# Patient Record
Sex: MALE | Employment: OTHER | URBAN - METROPOLITAN AREA
[De-identification: names, ages, dates, MRNs, and addresses within clinical notes are randomized per-mention and may not be internally consistent; named-entity substitution may affect disease eponyms.]

---

## 2018-09-26 ENCOUNTER — APPOINTMENT (OUTPATIENT)
Dept: GENERAL RADIOLOGY | Age: 73
DRG: 871 | End: 2018-09-26
Attending: EMERGENCY MEDICINE
Payer: MEDICARE

## 2018-09-26 ENCOUNTER — HOSPITAL ENCOUNTER (INPATIENT)
Age: 73
LOS: 12 days | Discharge: NURSING, REHAB OR LONG-TERM CARE | DRG: 871 | End: 2018-10-08
Attending: EMERGENCY MEDICINE | Admitting: HOSPITALIST
Payer: MEDICARE

## 2018-09-26 ENCOUNTER — APPOINTMENT (OUTPATIENT)
Dept: CT IMAGING | Age: 73
DRG: 871 | End: 2018-09-26
Attending: EMERGENCY MEDICINE
Payer: MEDICARE

## 2018-09-26 DIAGNOSIS — R06.02 SOB (SHORTNESS OF BREATH): Primary | ICD-10-CM
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
## 2018-09-26 PROBLEM — R09.02 HYPOXIA: Status: ACTIVE | Noted: 2018-09-26

## 2018-09-26 PROBLEM — J20.9 ACUTE BRONCHITIS WITH COPD (HCC): Status: ACTIVE | Noted: 2018-09-26

## 2018-09-26 PROBLEM — J44.0 ACUTE BRONCHITIS WITH COPD (HCC): Status: ACTIVE | Noted: 2018-09-26

## 2018-09-26 LAB
ALBUMIN SERPL-MCNC: 2.6 G/DL (ref 3.4–5)
ALBUMIN/GLOB SERPL: 0.4 {RATIO} (ref 0.8–1.7)
ALP SERPL-CCNC: 103 U/L (ref 45–117)
ALT SERPL-CCNC: 11 U/L (ref 16–61)
ANION GAP SERPL CALC-SCNC: 12 MMOL/L (ref 3–18)
APPEARANCE UR: CLEAR
ARTERIAL PATENCY WRIST A: YES
AST SERPL-CCNC: 15 U/L (ref 15–37)
BACTERIA URNS QL MICRO: ABNORMAL /HPF
BASE DEFICIT BLD-SCNC: 2 MMOL/L
BASOPHILS # BLD: 0 K/UL (ref 0–0.1)
BASOPHILS NFR BLD: 0 % (ref 0–2)
BDY SITE: ABNORMAL
BILIRUB SERPL-MCNC: 0.9 MG/DL (ref 0.2–1)
BILIRUB UR QL: NEGATIVE
BUN SERPL-MCNC: 26 MG/DL (ref 7–18)
BUN/CREAT SERPL: 27 (ref 12–20)
CALCIUM SERPL-MCNC: 8.1 MG/DL (ref 8.5–10.1)
CHLORIDE SERPL-SCNC: 98 MMOL/L (ref 100–108)
CK MB CFR SERPL CALC: 7.1 % (ref 0–4)
CK MB SERPL-MCNC: 3 NG/ML (ref 5–25)
CK SERPL-CCNC: 42 U/L (ref 39–308)
CO2 SERPL-SCNC: 26 MMOL/L (ref 21–32)
COLOR UR: ABNORMAL
CREAT SERPL-MCNC: 0.98 MG/DL (ref 0.6–1.3)
DIFFERENTIAL METHOD BLD: ABNORMAL
EOSINOPHIL # BLD: 0 K/UL (ref 0–0.4)
EOSINOPHIL NFR BLD: 0 % (ref 0–5)
EPITH CASTS URNS QL MICRO: ABNORMAL /LPF (ref 0–5)
ERYTHROCYTE [DISTWIDTH] IN BLOOD BY AUTOMATED COUNT: 13.3 % (ref 11.6–14.5)
GAS FLOW.O2 O2 DELIVERY SYS: ABNORMAL L/MIN
GLOBULIN SER CALC-MCNC: 6 G/DL (ref 2–4)
GLUCOSE SERPL-MCNC: 90 MG/DL (ref 74–99)
GLUCOSE UR STRIP.AUTO-MCNC: NEGATIVE MG/DL
HCO3 BLD-SCNC: 23.9 MMOL/L (ref 22–26)
HCT VFR BLD AUTO: 40.9 % (ref 36–48)
HGB BLD-MCNC: 13.9 G/DL (ref 13–16)
HGB UR QL STRIP: NEGATIVE
KETONES UR QL STRIP.AUTO: 15 MG/DL
LACTATE BLD-SCNC: 2.4 MMOL/L (ref 0.4–2)
LEUKOCYTE ESTERASE UR QL STRIP.AUTO: NEGATIVE
LYMPHOCYTES # BLD: 0.7 K/UL (ref 0.9–3.6)
LYMPHOCYTES NFR BLD: 6 % (ref 21–52)
MCH RBC QN AUTO: 32.9 PG (ref 24–34)
MCHC RBC AUTO-ENTMCNC: 34 G/DL (ref 31–37)
MCV RBC AUTO: 96.7 FL (ref 74–97)
MONOCYTES # BLD: 1.1 K/UL (ref 0.05–1.2)
MONOCYTES NFR BLD: 9 % (ref 3–10)
NEUTS SEG # BLD: 10.9 K/UL (ref 1.8–8)
NEUTS SEG NFR BLD: 85 % (ref 40–73)
NITRITE UR QL STRIP.AUTO: NEGATIVE
O2/TOTAL GAS SETTING VFR VENT: 21 %
PCO2 BLD: 42.2 MMHG (ref 35–45)
PH BLD: 7.36 [PH] (ref 7.35–7.45)
PH UR STRIP: 5 [PH] (ref 5–8)
PLATELET # BLD AUTO: 237 K/UL (ref 135–420)
PMV BLD AUTO: 11.3 FL (ref 9.2–11.8)
PO2 BLD: 55 MMHG (ref 80–100)
POTASSIUM SERPL-SCNC: 4.7 MMOL/L (ref 3.5–5.5)
PROT SERPL-MCNC: 8.6 G/DL (ref 6.4–8.2)
PROT UR STRIP-MCNC: ABNORMAL MG/DL
RBC # BLD AUTO: 4.23 M/UL (ref 4.7–5.5)
RBC #/AREA URNS HPF: ABNORMAL /HPF (ref 0–5)
SAO2 % BLD: 87 % (ref 92–97)
SERVICE CMNT-IMP: ABNORMAL
SODIUM SERPL-SCNC: 136 MMOL/L (ref 136–145)
SP GR UR REFRACTOMETRY: 1.02 (ref 1–1.03)
SPECIMEN TYPE: ABNORMAL
TOTAL RESP. RATE, ITRR: 20
TROPONIN I SERPL-MCNC: 0.18 NG/ML (ref 0–0.04)
UROBILINOGEN UR QL STRIP.AUTO: 1 EU/DL (ref 0.2–1)
WBC # BLD AUTO: 12.7 K/UL (ref 4.6–13.2)
WBC URNS QL MICRO: ABNORMAL /HPF (ref 0–4)

## 2018-09-26 PROCEDURE — 94640 AIRWAY INHALATION TREATMENT: CPT

## 2018-09-26 PROCEDURE — 83605 ASSAY OF LACTIC ACID: CPT

## 2018-09-26 PROCEDURE — 74011000250 HC RX REV CODE- 250: Performed by: EMERGENCY MEDICINE

## 2018-09-26 PROCEDURE — 81001 URINALYSIS AUTO W/SCOPE: CPT | Performed by: EMERGENCY MEDICINE

## 2018-09-26 PROCEDURE — 70450 CT HEAD/BRAIN W/O DYE: CPT

## 2018-09-26 PROCEDURE — 87077 CULTURE AEROBIC IDENTIFY: CPT | Performed by: EMERGENCY MEDICINE

## 2018-09-26 PROCEDURE — 74011250636 HC RX REV CODE- 250/636: Performed by: EMERGENCY MEDICINE

## 2018-09-26 PROCEDURE — 71045 X-RAY EXAM CHEST 1 VIEW: CPT

## 2018-09-26 PROCEDURE — 93005 ELECTROCARDIOGRAM TRACING: CPT

## 2018-09-26 PROCEDURE — 74011250636 HC RX REV CODE- 250/636: Performed by: HOSPITALIST

## 2018-09-26 PROCEDURE — 74011636320 HC RX REV CODE- 636/320: Performed by: EMERGENCY MEDICINE

## 2018-09-26 PROCEDURE — 87040 BLOOD CULTURE FOR BACTERIA: CPT | Performed by: EMERGENCY MEDICINE

## 2018-09-26 PROCEDURE — 87186 SC STD MICRODIL/AGAR DIL: CPT | Performed by: EMERGENCY MEDICINE

## 2018-09-26 PROCEDURE — 77030029684 HC NEB SM VOL KT MONA -A

## 2018-09-26 PROCEDURE — 99285 EMERGENCY DEPT VISIT HI MDM: CPT

## 2018-09-26 PROCEDURE — 71275 CT ANGIOGRAPHY CHEST: CPT

## 2018-09-26 PROCEDURE — 80053 COMPREHEN METABOLIC PANEL: CPT | Performed by: EMERGENCY MEDICINE

## 2018-09-26 PROCEDURE — 82803 BLOOD GASES ANY COMBINATION: CPT

## 2018-09-26 PROCEDURE — 82550 ASSAY OF CK (CPK): CPT | Performed by: EMERGENCY MEDICINE

## 2018-09-26 PROCEDURE — 85025 COMPLETE CBC W/AUTO DIFF WBC: CPT | Performed by: EMERGENCY MEDICINE

## 2018-09-26 PROCEDURE — 65660000000 HC RM CCU STEPDOWN

## 2018-09-26 PROCEDURE — 36600 WITHDRAWAL OF ARTERIAL BLOOD: CPT

## 2018-09-26 PROCEDURE — 74011636637 HC RX REV CODE- 636/637: Performed by: EMERGENCY MEDICINE

## 2018-09-26 RX ORDER — PREDNISONE 20 MG/1
60 TABLET ORAL
Status: COMPLETED | OUTPATIENT
Start: 2018-09-26 | End: 2018-09-26

## 2018-09-26 RX ORDER — LEVOFLOXACIN 5 MG/ML
750 INJECTION, SOLUTION INTRAVENOUS ONCE
Status: COMPLETED | OUTPATIENT
Start: 2018-09-26 | End: 2018-09-27

## 2018-09-26 RX ORDER — ALBUTEROL SULFATE 0.83 MG/ML
2.5 SOLUTION RESPIRATORY (INHALATION)
Status: COMPLETED | OUTPATIENT
Start: 2018-09-26 | End: 2018-09-26

## 2018-09-26 RX ADMIN — PREDNISONE 60 MG: 20 TABLET ORAL at 21:58

## 2018-09-26 RX ADMIN — SODIUM CHLORIDE 1000 ML: 900 INJECTION, SOLUTION INTRAVENOUS at 23:18

## 2018-09-26 RX ADMIN — ALBUTEROL SULFATE 2.5 MG: 2.5 SOLUTION RESPIRATORY (INHALATION) at 21:58

## 2018-09-26 RX ADMIN — IOPAMIDOL 80 ML: 755 INJECTION, SOLUTION INTRAVENOUS at 22:13

## 2018-09-26 RX ADMIN — LEVOFLOXACIN 750 MG: 750 INJECTION, SOLUTION INTRAVENOUS at 22:34

## 2018-09-26 NOTE — IP AVS SNAPSHOT
[address] Patient: Shira Farah MRN: [id number] :1945 A check dianne indicates which time of day the medication should be taken. My Medications START taking these medications Instructions Each Dose to Equal  
 Morning Noon Evening Bedtime  
 acetaminophen 500 mg tablet Commonly known as:  TYLENOL Your last dose was: Your next dose is: Take 1 Tab by mouth every six (6) hours as needed. 500 mg  
    
   
   
   
  
 albuterol-ipratropium 2.5 mg-0.5 mg/3 ml Nebu Commonly known as:  Alfonso Irwin Your last dose was: Your next dose is:    
   
   
 3 mL by Nebulization route every six (6) hours as needed. 3 mL  
    
   
   
   
  
 amoxicillin-clavulanate 875-125 mg per tablet Commonly known as:  AUGMENTIN Your last dose was: Your next dose is: Take 1 Tab by mouth two (2) times a day for 30 days. 1 Tab  
    
   
   
   
  
 cholestyramine-aspartame 4 gram packet Commonly known as:  Leatha Foot Your last dose was: Your next dose is: Take 1 Packet by mouth three (3) times daily (with meals) for 14 days. 4 g  
    
   
   
   
  
 diphenhydrAMINE 25 mg capsule Commonly known as:  BENADRYL Your last dose was: Your next dose is: Take 1 Cap by mouth every six (6) hours as needed for up to 10 days. 25 mg  
    
   
   
   
  
 enoxaparin 40 mg/0.4 mL Commonly known as:  LOVENOX Your last dose was: Your next dose is: 0.4 mL by SubCUTAneous route every twenty-four (24) hours for 21 days. 40 mg  
    
   
   
   
  
 guaiFENesin  mg ER tablet Commonly known as:  Phillip & Phillip Your last dose was: Your next dose is: Take 1 Tab by mouth every twelve (12) hours for 7 days.   
 600 mg  
    
   
   
   
  
 lactobacillus sp. 50 billion cpu 50 billion cell -375 mg Cap capsule Commonly known as:  BIO-K PLUS Your last dose was: Your next dose is: Take 1 Cap by mouth daily for 35 days. 1 Cap  
    
   
   
   
  
 therapeutic multivitamin tablet Commonly known as:  [facility] Your last dose was: Your next dose is: Take 1 Tab by mouth daily. 1 Tab  
    
   
   
   
  
 zinc oxide 20 % ointment Your last dose was: Your next dose is:    
   
   
 Apply 1 g to affected area three (3) times daily. 1 g Where to Get Your Medications Information on where to get these meds will be given to you by the nurse or doctor. ! Ask your nurse or doctor about these medications  
  acetaminophen 500 mg tablet  
 albuterol-ipratropium 2.5 mg-0.5 mg/3 ml Nebu  
 amoxicillin-clavulanate 875-125 mg per tablet  
 cholestyramine-aspartame 4 gram packet  
 diphenhydrAMINE 25 mg capsule  
 enoxaparin 40 mg/0.4 mL  
 guaiFENesin  mg ER tablet  
 lactobacillus sp. 50 billion cpu 50 billion cell -375 mg Cap capsule  
 therapeutic multivitamin tablet  
 zinc oxide 20 % ointment

## 2018-09-26 NOTE — IP AVS SNAPSHOT
Keaton Bettie 
 
 
 [address] Patient: Srinivas Dawkins MRN: [id number] :1945 About your hospitalization You were admitted on:  2018 You last received care in the:  [address] [place] [address] You were discharged on:  2018 Why you were hospitalized Your primary diagnosis was:  Community Acquired Pneumonia Your diagnoses also included:  Sob (Shortness Of Breath), Hypoxia, Acute Bronchitis With Copd (Hcc), Pain Of Left Hip Joint, Fall At [facility] Follow-up Information Follow up With Details Comments Contact Info Esvin Singh MD Go on 10/1/2018 PCP appointment. Appointment time is at 2:20pm, please arrive at 2:10pm. Bring insurance card, I.D, and list of medications. Nae [facility] [place] [id number] 
[phone] Radha Delgadillo MD  Will call patient with appointment when Dr. Tyesha Barlow come in office [address] [place] [address] [address] [facility] [address] [phone] 
[phone] None   None ([phone]) Patient [facility] that [facility] [address] [facility] [address] 
[phone] Your Scheduled Appointments 2018 10:00 AM EST Follow Up with Radha Delgadillo MD  
4600  [address] ([address]) [address] [place] [address], [facility] [address] [facility] [address] [address]  
[phone] Discharge Orders None A check dianne indicates which time of day the medication should be taken. My Medications START taking these medications Instructions Each Dose to Equal  
 Morning Noon Evening Bedtime  
 acetaminophen 500 mg tablet Commonly known as:  TYLENOL Your last dose was: Your next dose is: Take 1 Tab by mouth every six (6) hours as needed. 500 mg  
    
   
   
   
  
 albuterol-ipratropium 2.5 mg-0.5 mg/3 ml Nebu Commonly known as:  Bearl Prom Your last dose was: Your next dose is:    
   
   
 3 mL by Nebulization route every six (6) hours as needed. 3 mL  
    
   
   
   
  
 amoxicillin-clavulanate 875-125 mg per tablet Commonly known as:  AUGMENTIN Your last dose was: Your next dose is: Take 1 Tab by mouth two (2) times a day for 30 days. 1 Tab  
    
   
   
   
  
 cholestyramine-aspartame 4 gram packet Commonly known as:  Cleveland Balling Your last dose was: Your next dose is: Take 1 Packet by mouth three (3) times daily (with meals) for 14 days. 4 g  
    
   
   
   
  
 diphenhydrAMINE 25 mg capsule Commonly known as:  BENADRYL Your last dose was: Your next dose is: Take 1 Cap by mouth every six (6) hours as needed for up to 10 days. 25 mg  
    
   
   
   
  
 enoxaparin 40 mg/0.4 mL Commonly known as:  LOVENOX Your last dose was: Your next dose is: 0.4 mL by SubCUTAneous route every twenty-four (24) hours for 21 days. 40 mg  
    
   
   
   
  
 guaiFENesin  mg ER tablet Commonly known as:  Phillip & Phillip Your last dose was: Your next dose is: Take 1 Tab by mouth every twelve (12) hours for 7 days. 600 mg  
    
   
   
   
  
 lactobacillus sp. 50 billion cpu 50 billion cell -375 mg Cap capsule Commonly known as:  BIO-K PLUS Your last dose was: Your next dose is: Take 1 Cap by mouth daily for 35 days. 1 Cap  
    
   
   
   
  
 therapeutic multivitamin tablet Commonly known as:  [facility] Your last dose was: Your next dose is: Take 1 Tab by mouth daily. 1 Tab  
    
   
   
   
  
 zinc oxide 20 % ointment Your last dose was: Your next dose is:    
   
   
 Apply 1 g to affected area three (3) times daily.   
 1 g  
    
   
   
   
  
  
  
 Where to Get Your Medications Information on where to get these meds will be given to you by the nurse or doctor. ! Ask your nurse or doctor about these medications  
  acetaminophen 500 mg tablet  
 albuterol-ipratropium 2.5 mg-0.5 mg/3 ml Nebu  
 amoxicillin-clavulanate 875-125 mg per tablet  
 cholestyramine-aspartame 4 gram packet  
 diphenhydrAMINE 25 mg capsule  
 enoxaparin 40 mg/0.4 mL  
 guaiFENesin  mg ER tablet  
 lactobacillus sp. 50 billion cpu 50 billion cell -375 mg Cap capsule  
 therapeutic multivitamin tablet  
 zinc oxide 20 % ointment Discharge Instructions Preventing Falls: Care Instructions Your Care Instructions Getting around your home safely can be a challenge if you have injuries or health problems that make it easy for you to fall. Loose rugs and furniture in walkways are among the dangers for many older people who have problems walking or who have poor eyesight. People who have conditions such as arthritis, osteoporosis, or dementia also have to be careful not to fall. You can make your home safer with a few simple measures. Follow-up care is a key part of your treatment and safety. Be sure to make and go to all appointments, and call your doctor if you are having problems. It's also a good idea to know your test results and keep a list of the medicines you take. How can you care for yourself at home? Taking care of yourself · You may get dizzy if you do not drink enough water. To prevent dehydration, drink plenty of fluids, enough so that your urine is light yellow or clear like water. Choose water and other caffeine-free clear liquids. If you have kidney, heart, or liver disease and have to limit fluids, talk with your doctor before you increase the amount of fluids you drink. · Exercise regularly to improve your strength, muscle tone, and balance. Walk if you can. Swimming may be a good choice if you cannot walk easily. · Have your vision and hearing checked each year or any time you notice a change. If you have trouble seeing and hearing, you might not be able to avoid objects and could lose your balance. · Know the side effects of the medicines you take. Ask your doctor or pharmacist whether the medicines you take can affect your balance. Sleeping pills or sedatives can affect your balance. · Limit the amount of alcohol you drink. Alcohol can impair your balance and other senses. · Ask your doctor whether calluses or corns on your feet need to be removed. If you wear loose-fitting shoes because of calluses or corns, you can lose your balance and fall. · Talk to your doctor if you have numbness in your feet. Preventing falls at home · Remove raised doorway thresholds, throw rugs, and clutter. Repair loose carpet or raised areas in the floor. · Move furniture and electrical cords to keep them out of walking paths. · Use nonskid floor wax, and wipe up spills right away, especially on ceramic tile floors. · If you use a walker or cane, put rubber tips on it. If you use crutches, clean the bottoms of them regularly with an abrasive pad, such as steel wool. · Keep your house well lit, especially [facility], and outside walkways. Use night-lights in areas such as hallways and bathrooms. Add extra light switches or use remote switches (such as switches that go on or off when you clap your hands) to make it easier to turn lights on if you have to get up during the night. · Install sturdy handrails on stairways. · Move items in your cabinets so that the things you use a lot are on the lower shelves (about waist level). · Keep a cordless phone and a flashlight with new batteries by your bed. If possible, put a phone in each of the main rooms of your house, or carry a cell phone in case you fall and cannot reach a phone. Or, you can wear a device around your neck or wrist. You push a button that sends a signal for help. · Wear low-heeled shoes that fit well and give your feet good support. Use footwear with nonskid soles. Check the heels and soles of your shoes for wear. Repair or replace worn heels or soles. · Do not wear socks without shoes on wood floors. · Walk on the grass when the sidewalks are slippery. If you live in an area that gets snow and ice in the winter, sprinkle salt on slippery steps and sidewalks. Preventing falls in the bath · Install grab bars and nonskid mats inside and outside your shower or tub and near the toilet and sinks. · Use shower chairs and bath benches. · Use a hand-held shower head that will allow you to sit while showering. · Get into a tub or shower by putting the weaker leg in first. Get out of a tub or shower with your strong side first. 
· Repair loose toilet seats and consider installing a raised toilet seat to make getting on and off the toilet easier. · Keep your bathroom door unlocked while you are in the shower. Where can you learn more? Go to http://justin-oscar.info/. Enter 0476 79 69 [id number] in the search box to learn more about \"Preventing Falls: Care Instructions. \" Current as of: March 16, 2018 Content Version: 11.8 © 5938-1153 [employer]. Care instructions adapted under license by [employer] (which disclaims liability or warranty for this information). If you have questions about a medical condition or this instruction, always ask your healthcare professional. [place] [address] any warranty or liability for your use of this information. Bronchitis: Care Instructions Your Care Instructions Bronchitis is inflammation of the bronchial tubes, which carry air to the lungs. The tubes swell and produce mucus, or phlegm. The mucus and inflamed bronchial tubes make you cough. You may have trouble breathing.  
Most cases of bronchitis are caused by viruses like those that cause colds. Antibiotics usually do not help and they may be harmful. Bronchitis usually develops rapidly and lasts about 2 to 3 weeks in otherwise healthy people. Follow-up care is a key part of your treatment and safety. Be sure to make and go to all appointments, and call your doctor if you are having problems. It's also a good idea to know your test results and keep a list of the medicines you take. How can you care for yourself at home? · Take all medicines exactly as prescribed. Call your doctor if you think you are having a problem with your medicine. · Get some extra rest. 
· Take an over-the-counter pain medicine, such as acetaminophen (Tylenol), ibuprofen (Advil, Motrin), or naproxen (Aleve) to reduce fever and relieve body aches. Read and follow all instructions on the label. · Do not take two or more pain medicines at the same time unless the doctor told you to. Many pain medicines have acetaminophen, which is Tylenol. Too much acetaminophen (Tylenol) can be harmful. · Take an over-the-counter cough medicine that contains dextromethorphan to help quiet a dry, hacking cough so that you can sleep. Avoid cough medicines that have more than one active ingredient. Read and follow all instructions on the label. · Breathe moist air from a humidifier, hot shower, or sink filled with hot water. The heat and moisture will thin mucus so you can cough it out. · Do not smoke. Smoking can make bronchitis worse. If you need help quitting, talk to your doctor about stop-smoking programs and medicines. These can increase your chances of quitting for good. When should you call for help? Call 911 anytime you think you may need emergency care. For example, call if: 
  · You have severe trouble breathing.  
 Call your doctor now or seek immediate medical care if: 
  · You have new or worse trouble breathing.  
  · You cough up dark brown or bloody mucus (sputum).  
  · You have a new or higher fever.   · You have a new rash.  
 Watch closely for changes in your health, and be sure to contact your doctor if: 
  · You cough more deeply or more often, especially if you notice more mucus or a change in the color of your mucus.  
  · You are not getting better as expected. Where can you learn more? Go to http://justin-oscar.info/. Enter H333 in the search box to learn more about \"Bronchitis: Care Instructions. \" Current as of: December 6, 2017 Content Version: 11.8 © 9122-0215 [employer]. Care instructions adapted under license by [employer] (which disclaims liability or warranty for this information). If you have questions about a medical condition or this instruction, always ask your healthcare professional. [address] any warranty or liability for your use of this information. Learning About COPD and How to Prevent Lung Infections How do lung infections affect COPD? Lung infections like pneumonia and acute bronchitis are common causes of COPD flare-ups. And people who have COPD are more likely to get these lung infections, especially if they smoke. When you have COPD, it is important to know the symptoms of pneumonia and acute bronchitis and call your doctor if you have them. Symptoms include: · A cough that brings up more mucus than usual. 
· Fever. · Shortness of breath. What can you do to prevent these infections? Stay healthy · Get a flu shot every year. · Get a pneumococcal vaccine shot. If you have had one before, ask your doctor whether you need another dose. Two different types of pneumococcal vaccines are recommended for people ages 72 and older. · If you must be around people with colds or the flu, wash your hands often. · Do not smoke. This is the most important step you can take to prevent more damage to your lungs.  If you need help quitting, talk to your doctor about stop-smoking programs and medicines. These can increase your chances of quitting for good. · Avoid secondhand smoke, air pollution, and high altitudes. Also avoid cold, dry air and hot, humid air. Stay at home with your windows closed when air pollution is bad. Exercise and eat well · If your doctor recommends it, get more exercise. Walking is a good choice. Bit by bit, increase the amount you walk every day. Try for at least 30 minutes on most days of the week. · Eat regular, well-balanced meals. Eating right keeps your energy levels up and helps your body fight infection. · Get plenty of rest and sleep. Follow-up care is a key part of your treatment and safety. Be sure to make and go to all appointments, and call your doctor if you are having problems. It's also a good idea to know your test results and keep a list of the medicines you take. Where can you learn more? Go to http://justin-oscar.info/. Enter E214 in the search box to learn more about \"Learning About COPD and How to Prevent Lung Infections. \" Current as of: December 6, 2017 Content Version: 11.8 © 4120-0873 [employer]. Care instructions adapted under license by [employer] (which disclaims liability or warranty for this information). If you have questions about a medical condition or this instruction, always ask your healthcare professional. [address] any warranty or liability for your use of this information. Germin8 Announcement We are excited to announce that we are making your provider's discharge notes available to you in Germin8. You will see these notes when they are completed and signed by the physician that discharged you from your recent hospital stay.   If you have any questions or concerns about any information you see in Germin8, please call the Health Information Department where you were seen or reach out to your Primary Care Provider for more information about your plan of care. Introducing [facility]! [facility] introduces YouGoDo patient portal. Now you can access parts of your medical record, email your doctor's office, and request medication refills online. 1. In your internet browser, go to [email or web address] 2. Click on the First Time User? Click Here link in the Sign In box. You will see the New Member Sign Up page. 3. Enter your YouGoDo Access Code exactly as it appears below. You will not need to use this code after youve completed the sign-up process. If you do not sign up before the expiration date, you must request a new code. · YouGoDo Access Code: [email or web address] Expires: 12/25/2018  6:51 PM 
 
4. Enter the last four digits of your Social Security Number (xxxx) and Date of Birth (mm/dd/yyyy) as indicated and click Submit. You will be taken to the next sign-up page. 5. Create a YouGoDo ID. This will be your YouGoDo login ID and cannot be changed, so think of one that is secure and easy to remember. 6. Create a YouGoDo password. You can change your password at any time. 7. Enter your Password Reset Question and Answer. This can be used at a later time if you forget your password. 8. Enter your e-mail address. You will receive e-mail notification when new information is available in [address] 9. Click Sign Up. You can now view and download portions of your medical record. 10. Click the Download Summary menu link to download a portable copy of your medical information. If you have questions, please visit the Frequently Asked Questions section of the YouGoDo website. Remember, YouGoDo is NOT to be used for urgent needs. For medical emergencies, dial 911. Now available from your iPhone and Android! Introducing Jayy Smalls As a [facility] patient, I wanted to make you aware of our electronic visit tool called Jayy Smalls. [employer] 24/7 allows you to connect within minutes with a medical provider 24 hours a day, seven days a week via a mobile device or tablet or logging into a secure website from your computer. You can access FreePriceAlerts from anywhere in the [place]. A virtual visit might be right for you when you have a simple condition and feel like you just dont want to get out of bed, or cant get away from work for an appointment, when your regular [employer] provider is not available (evenings, weekends or holidays), or when youre out of town and need minor care. Electronic visits cost only $49 and if the [employer] 24/7 provider determines a prescription is needed to treat your condition, one can be electronically transmitted to a nearby pharmacy*. Please take a moment to enroll today if you have not already done so. The enrollment process is free and takes just a few minutes. To enroll, please download the [employer] 24/7 michelle to your tablet or phone, or visit [email or web address] to enroll on your computer. And, as an [address] [place] [address] patient with a DermLink account, the results of your visits will be scanned into your electronic medical record and your primary care provider will be able to view the scanned results. We urge you to continue to see your regular [employer] provider for your ongoing medical care. And while your primary care provider may not be the one available when you seek a BoosterMediatiffaniefin virtual visit, the peace of mind you get from getting a real diagnosis real time can be priceless. For more information on FreePriceAlerts, view our Frequently Asked Questions (FAQs) at [email or web address]. Sincerely, 
 
Andrew Velazco MD 
Chief Medical Officer Jose Romero *:  certain medications cannot be prescribed via FreePriceAlerts Providers Seen During Your Hospitalization Provider Specialty Primary office phone Jerri Palacios MD Emergency Medicine [phone] Manuel Mccall, [address] Internal Medicine [phone] Estefanía Gauthier MD Internal Medicine [phone] Kerri Price MD Internal Medicine [phone] Isa Duverney, MD Internal Medicine [phone] Immunizations Administered for This Admission Name Date Influenza Vaccine (Quad) PF  Deferred () Your Primary Care Physician (PCP) Primary Care Physician Office Phone Office Fax NONE ** None ** ** None ** You are allergic to the following Allergen Reactions Cat Dander Sneezing Pollen Extracts Runny Nose Recent Documentation Height Weight BMI  
  
  
 1.803 m 51 kg 15.68 kg/m2 Emergency Contacts Name Discharge Info Relation Home Work Mobile [address] MiguelPaid To Party [employer]y CAREGIVER [3] Parent [1] [phone] Patient Belongings The following personal items are in your possession at time of discharge: 
  Dental Appliances: None  Visual Aid: None      Home Medications: None   Jewelry: Watch  Clothing: None    Other Valuables: None Please provide this summary of care documentation to your next provider. Signatures-by signing, you are acknowledging that this After Visit Summary has been reviewed with you and you have received a copy. Patient Signature:  ____________________________________________________________ Date:  ____________________________________________________________  
  
Padmini Oviedo Provider Signature:  ____________________________________________________________ Date:  ____________________________________________________________

## 2018-09-26 NOTE — ED TRIAGE NOTES
[facility] to patient being in chair, covered in urine and feces and unable to care for himself. Patient lived with his mother who was sent to a nursing facility so now he lives alone and family would like for placement to be found for patient. Medics administered 1 Duoneb treatment and placed patient on 4LNC.

## 2018-09-26 NOTE — ED NOTES
Bedside shift change report given to KODI Mcdermott (oncoming nurse) by Rangel Rice (offgoing nurse). Report included the following information ED Summary.

## 2018-09-26 NOTE — ED PROVIDER NOTES
EMERGENCY DEPARTMENT HISTORY AND PHYSICAL EXAM 
 
7:21 PM 
 
 
Date: 9/26/2018 Patient Name: Mary Castillo History of Presenting Illness Chief Complaint Patient presents with  Lethargy History Provided By: Patient Additional History (Context): 7:21 PM Mary Castillo is a 68 y.o. male with h/o no noted pertinent MHx who presents to ED for evaluation after not being able to get out of his chair associated with rhinorrhea onset today. Patient states that normally he is able to get up and move around but today he was not. He reports not being able to move which caused him to sit in the chair all day today. He was found by family members in the chair covered in urine and feces. He now lives alone now that his mother lives in a nursing home. No other concerns or symptoms at this time. PCP: None Chief Complaint: \"Couldnt get out of my chair\" Duration:  Today Timing:  Acute Location: N/A Quality: N/A Severity: Moderate Modifying Factors: No modifying factors Associated Symptoms: rhinorrhea Past History Past Medical History: No past medical history on file. Past Surgical History: No past surgical history on file. Family History: No family history on file. Social History: 
Social History Substance Use Topics  Smoking status: Not on file  Smokeless tobacco: Not on file  Alcohol use Not on file Allergies: 
Not on File Review of Systems Review of Systems Constitutional: Negative for chills and fever. HENT: Positive for rhinorrhea. Cardiovascular: Negative for chest pain. All other systems reviewed and are negative. Physical Exam  
There were no vitals taken for this visit. Physical Exam  
Constitutional: He is oriented to person, place, and time. He appears well-nourished. Frail, elderly HENT:  
Head: Normocephalic and atraumatic. Eyes: EOM are normal. Pupils are equal, round, and reactive to light. Neck: Normal range of motion. Neck supple. Cardiovascular: Normal rate, regular rhythm and normal heart sounds. Exam reveals no friction rub. No murmur heard. Pulmonary/Chest: Effort normal. No respiratory distress. He has wheezes. B/l wheeze at bases. Abdominal: Soft. He exhibits no distension. There is no tenderness. There is no rebound and no guarding. Musculoskeletal: Normal range of motion. Neurological: He is alert and oriented to person, place, and time. Skin: Skin is warm and dry. Psychiatric: He has a normal mood and affect. His behavior is normal. Thought content normal.  
 
 
Diagnostic Study Results Medical Decision Making  
 
cxr noted.  
ekg st at 118; nl axis. No set/d. No hypertrophy incomplete RBBB 
+SOB; up to 4L 02 will turn off and check abg. Afebrile, cld;  Doubt pna as no new sx of pna (only weakness) afebrile. Will d/w hospitalist fro admission Colin/ dr Madilyn Simmonds will addd abx; cta/pe study Diagnosis No diagnosis found. _______________________________ Attestations: 
Scribe Attestation Alina Peraza acting as a scribe for and in the presence of Sinan Bruce MD     
September 26, 2018 at 7:21 PM 
    
Provider Attestation:     
I personally performed the services described in the documentation, reviewed the documentation, as recorded by the scribe in my presence, and it accurately and completely records my words and actions. September 26, 2018 at 7:21 PM - Sinan Bruce MD   
_______________________________

## 2018-09-27 ENCOUNTER — APPOINTMENT (OUTPATIENT)
Dept: GENERAL RADIOLOGY | Age: 73
DRG: 871 | End: 2018-09-27
Attending: HOSPITALIST
Payer: MEDICARE

## 2018-09-27 ENCOUNTER — APPOINTMENT (OUTPATIENT)
Dept: CT IMAGING | Age: 73
DRG: 871 | End: 2018-09-27
Attending: HOSPITALIST
Payer: MEDICARE

## 2018-09-27 PROBLEM — J18.9 COMMUNITY ACQUIRED PNEUMONIA: Status: ACTIVE | Noted: 2018-09-27
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
## 2018-09-27 PROBLEM — M25.552 PAIN OF LEFT HIP JOINT: Status: ACTIVE | Noted: 2018-09-27

## 2018-09-27 LAB
ANION GAP SERPL CALC-SCNC: 11 MMOL/L (ref 3–18)
ATRIAL RATE: 118 BPM
BUN SERPL-MCNC: 19 MG/DL (ref 7–18)
BUN/CREAT SERPL: 27 (ref 12–20)
CALCIUM SERPL-MCNC: 8 MG/DL (ref 8.5–10.1)
CALCULATED P AXIS, ECG09: 25 DEGREES
CALCULATED R AXIS, ECG10: 39 DEGREES
CALCULATED T AXIS, ECG11: -20 DEGREES
CHLORIDE SERPL-SCNC: 100 MMOL/L (ref 100–108)
CK MB CFR SERPL CALC: 6.7 % (ref 0–4)
CK MB SERPL-MCNC: 3.1 NG/ML (ref 5–25)
CK SERPL-CCNC: 46 U/L (ref 39–308)
CO2 SERPL-SCNC: 25 MMOL/L (ref 21–32)
CREAT SERPL-MCNC: 0.71 MG/DL (ref 0.6–1.3)
DIAGNOSIS, 93000: NORMAL
ERYTHROCYTE [DISTWIDTH] IN BLOOD BY AUTOMATED COUNT: 13.4 % (ref 11.6–14.5)
GLUCOSE SERPL-MCNC: 107 MG/DL (ref 74–99)
HCT VFR BLD AUTO: 40.2 % (ref 36–48)
HGB BLD-MCNC: 13.6 G/DL (ref 13–16)
LACTATE SERPL-SCNC: 1 MMOL/L (ref 0.4–2)
MAGNESIUM SERPL-MCNC: 2.1 MG/DL (ref 1.6–2.6)
MCH RBC QN AUTO: 33.1 PG (ref 24–34)
MCHC RBC AUTO-ENTMCNC: 33.8 G/DL (ref 31–37)
MCV RBC AUTO: 97.8 FL (ref 74–97)
P-R INTERVAL, ECG05: 96 MS
PHOSPHATE SERPL-MCNC: 2.9 MG/DL (ref 2.5–4.9)
PLATELET # BLD AUTO: 173 K/UL (ref 135–420)
PMV BLD AUTO: 11.9 FL (ref 9.2–11.8)
POTASSIUM SERPL-SCNC: 4.4 MMOL/L (ref 3.5–5.5)
Q-T INTERVAL, ECG07: 302 MS
QRS DURATION, ECG06: 104 MS
QTC CALCULATION (BEZET), ECG08: 423 MS
RBC # BLD AUTO: 4.11 M/UL (ref 4.7–5.5)
SODIUM SERPL-SCNC: 136 MMOL/L (ref 136–145)
TROPONIN I SERPL-MCNC: 0.06 NG/ML (ref 0–0.04)
TROPONIN I SERPL-MCNC: 0.09 NG/ML (ref 0–0.04)
VENTRICULAR RATE, ECG03: 118 BPM
WBC # BLD AUTO: 8.4 K/UL (ref 4.6–13.2)

## 2018-09-27 PROCEDURE — 77030011256 HC DRSG MEPILEX <16IN NO BORD MOLN -A

## 2018-09-27 PROCEDURE — 92526 ORAL FUNCTION THERAPY: CPT

## 2018-09-27 PROCEDURE — 80048 BASIC METABOLIC PNL TOTAL CA: CPT | Performed by: HOSPITALIST

## 2018-09-27 PROCEDURE — 83605 ASSAY OF LACTIC ACID: CPT | Performed by: HOSPITALIST

## 2018-09-27 PROCEDURE — 84484 ASSAY OF TROPONIN QUANT: CPT | Performed by: HOSPITALIST

## 2018-09-27 PROCEDURE — 74011000250 HC RX REV CODE- 250: Performed by: HOSPITALIST

## 2018-09-27 PROCEDURE — 82553 CREATINE MB FRACTION: CPT | Performed by: HOSPITALIST

## 2018-09-27 PROCEDURE — 83735 ASSAY OF MAGNESIUM: CPT | Performed by: HOSPITALIST

## 2018-09-27 PROCEDURE — 74011250636 HC RX REV CODE- 250/636: Performed by: HOSPITALIST

## 2018-09-27 PROCEDURE — 84100 ASSAY OF PHOSPHORUS: CPT | Performed by: HOSPITALIST

## 2018-09-27 PROCEDURE — 72170 X-RAY EXAM OF PELVIS: CPT

## 2018-09-27 PROCEDURE — 74011636637 HC RX REV CODE- 636/637: Performed by: HOSPITALIST

## 2018-09-27 PROCEDURE — 92610 EVALUATE SWALLOWING FUNCTION: CPT

## 2018-09-27 PROCEDURE — 36415 COLL VENOUS BLD VENIPUNCTURE: CPT | Performed by: HOSPITALIST

## 2018-09-27 PROCEDURE — 74011250637 HC RX REV CODE- 250/637: Performed by: HOSPITALIST

## 2018-09-27 PROCEDURE — 65660000000 HC RM CCU STEPDOWN

## 2018-09-27 PROCEDURE — 73552 X-RAY EXAM OF FEMUR 2/>: CPT

## 2018-09-27 PROCEDURE — 77030010545

## 2018-09-27 PROCEDURE — 77010033678 HC OXYGEN DAILY

## 2018-09-27 PROCEDURE — 85027 COMPLETE CBC AUTOMATED: CPT | Performed by: HOSPITALIST

## 2018-09-27 PROCEDURE — 77030037878 HC DRSG MEPILEX >48IN BORD MOLN -B

## 2018-09-27 RX ORDER — ACETAMINOPHEN 325 MG/1
650 TABLET ORAL
Status: DISCONTINUED | OUTPATIENT
Start: 2018-09-27 | End: 2018-09-30

## 2018-09-27 RX ORDER — AZITHROMYCIN 250 MG/1
500 TABLET, FILM COATED ORAL DAILY
Status: COMPLETED | OUTPATIENT
Start: 2018-09-28 | End: 2018-09-30

## 2018-09-27 RX ORDER — HEPARIN SODIUM 5000 [USP'U]/ML
5000 INJECTION, SOLUTION INTRAVENOUS; SUBCUTANEOUS EVERY 8 HOURS
Status: DISCONTINUED | OUTPATIENT
Start: 2018-09-27 | End: 2018-09-28

## 2018-09-27 RX ORDER — THERA TABS 400 MCG
1 TAB ORAL DAILY
Status: DISCONTINUED | OUTPATIENT
Start: 2018-09-28 | End: 2018-10-08 | Stop reason: HOSPADM

## 2018-09-27 RX ORDER — FAMOTIDINE 20 MG/1
20 TABLET, FILM COATED ORAL 2 TIMES DAILY
Status: DISCONTINUED | OUTPATIENT
Start: 2018-09-27 | End: 2018-10-08 | Stop reason: HOSPADM

## 2018-09-27 RX ORDER — ONDANSETRON 4 MG/1
4 TABLET, ORALLY DISINTEGRATING ORAL
Status: DISCONTINUED | OUTPATIENT
Start: 2018-09-27 | End: 2018-10-08 | Stop reason: HOSPADM

## 2018-09-27 RX ORDER — GUAIFENESIN 600 MG/1
600 TABLET, EXTENDED RELEASE ORAL EVERY 12 HOURS
Status: DISCONTINUED | OUTPATIENT
Start: 2018-09-27 | End: 2018-10-08 | Stop reason: HOSPADM

## 2018-09-27 RX ORDER — PREDNISONE 20 MG/1
60 TABLET ORAL
Status: COMPLETED | OUTPATIENT
Start: 2018-09-27 | End: 2018-09-28

## 2018-09-27 RX ADMIN — HEPARIN SODIUM 5000 UNITS: 5000 INJECTION, SOLUTION INTRAVENOUS; SUBCUTANEOUS at 10:46

## 2018-09-27 RX ADMIN — PREDNISONE 60 MG: 20 TABLET ORAL at 18:55

## 2018-09-27 RX ADMIN — FAMOTIDINE 20 MG: 20 TABLET ORAL at 18:10

## 2018-09-27 RX ADMIN — HEPARIN SODIUM 5000 UNITS: 5000 INJECTION, SOLUTION INTRAVENOUS; SUBCUTANEOUS at 23:23

## 2018-09-27 RX ADMIN — GUAIFENESIN 600 MG: 600 TABLET, EXTENDED RELEASE ORAL at 20:43

## 2018-09-27 RX ADMIN — FAMOTIDINE 20 MG: 20 TABLET ORAL at 10:46

## 2018-09-27 RX ADMIN — WATER 2 G: 1 INJECTION INTRAMUSCULAR; INTRAVENOUS; SUBCUTANEOUS at 18:55

## 2018-09-27 RX ADMIN — HEPARIN SODIUM 5000 UNITS: 5000 INJECTION, SOLUTION INTRAVENOUS; SUBCUTANEOUS at 16:03

## 2018-09-27 RX ADMIN — ACETAMINOPHEN 650 MG: 325 TABLET ORAL at 20:48

## 2018-09-27 NOTE — PROGRESS NOTES
Pt placed on 2 liters nasal o2 after abg revealed hypoxemia.  abg was on room air, patient denies home oxygen.

## 2018-09-27 NOTE — PROGRESS NOTES
Reason for Admission:   Acute bronchitis with COPD, SOB, Hypoxia RRAT Score:      9 Plan for utilizing home health:     tbd Likelihood of Readmission: This will be moderate. Pt is having difficulty caring for himself. Transition of Care Plan:    Pt is AOx4 and states he lives with his mom. His mom was in the hospital and went to [facility] so pt had difficulty caring for himself. He states he uses his mom's walker sometimes and does not have any other DMEs. He does not have pcp. Gave him a list of [facility] physicians and the [employer] made appointment for him with Dr. Zach Teague. Pt states he will like to go to a SNF to get himself together before going back home. Called pt's brother Gregory Power [phone] and left a message for a return call. Will continue to follow. Care Management Interventions PCP Verified by CM: Yes 
Palliative Care Criteria Met (RRAT>21 & CHF Dx)?: No 
Mode of Transport at Discharge: Other (see comment) Transition of Care Consult (CM Consult): Discharge Planning Current Support Network: [facility] (pt lives with his mom) Confirm Follow Up Transport: Family Plan discussed with Pt/Family/Caregiver: Yes  
 
1315:  Met with pt's brother Gregory Power who states he would like for pt to go to SNF before going back home and requested [facility] where their mother is so they can both be in a room.

## 2018-09-27 NOTE — ED NOTES
Dr. Rashad Samson attempted to titrate down to room air. Patient's O2 dropped to 89% on room air. Patient placed back on 2L NC at this time.

## 2018-09-27 NOTE — PROGRESS NOTES
NUTRITION Nursing Referral: [facility] [[email or web address]] Nutrition Consult: General Nutrition Management & Supplements RECOMMENDATIONS / PLAN:  
 
- Add supplements: Ensure Enlive TID.  
- Continue RD inpatient monitoring and evaluation. NUTRITION INTERVENTIONS & DIAGNOSIS:  
 
[x] Meals/snacks: general/healthful diet [x] Medical food supplement therapy: initiate Nutrition Diagnosis:  
Underweight related to inadequate energy intake as evidenced by pt BMI of 14 kg/m^2. Unintended weight loss related to inadequate energy intake as evidenced by 14 lb, 11% weight loss from reported usual weight. ASSESSMENT:  
 
Pt reports good appetite and eating well PTA, consuming lost of sweets. Hungry and has not eaten today, diet started after swallow evaluation by SLP. Agreeable to supplements. Average po intake adequate to meet patients estimated nutritional needs:   [] Yes     [] No   [x] Unable to determine at this time Diet: DIET REGULAR Food Allergies: NKFA Current Appetite:   [x] Good     [] Fair     [] Poor     [] Other: 
Appetite/meal intake prior to admission:   [x] Good     [x] Fair     [] Poor     [] Other: 
Feeding Limitations:  [] Swallowing difficulty    [] Chewing difficulty    [x] Other: SLP following, recommends [facility] Current Meal Intake: No data found. BM: 9/27 Skin Integrity: sacral stage II pressure injury Edema:   [x] No     [] Yes Pertinent Medications: Reviewed: zofran, steroid, theragran Recent Labs  
   09/27/18 
 0933  09/26/18 
 2035 NA  136  136  
K  4.4  4.7 CL  100  98* CO2  25  26 GLU  107*  90 BUN  19*  26* CREA  0.71  0.98  
CA  8.0*  8.1* ALB   --   2.6* SGOT   --   15 ALT   --   11* Intake/Output Summary (Last 24 hours) at 09/27/18 1352 Last data filed at 09/27/18 5477 Gross per 24 hour Intake                0 ml Output              350 ml Net             -350 ml Anthropometrics: 
Ht Readings from Last 1 Encounters: 09/27/18 5' 11\" (1.803 m) Last 3 Recorded Weights in this Encounter  
 09/26/18 2305 09/27/18 0423 Weight: 52.8 kg (116 lb 6.5 oz) 46.2 kg (101 lb 12.8 oz) Body mass index is 14.2 kg/(m^2). Underweight Weight History: patient reports usual weight of 130 lb, unknown time period of weight loss (14 lb, 11% weight loss) Weight Metrics 9/27/2018 Weight 101 lb 12.8 oz BMI 14.2 kg/m2 Admitting Diagnosis: Acute bronchitis with COPD ([facility] [address] SOB (shortness of breath) Hypoxia Pertinent PMHx: No pertinent PMH Education Needs:        [x] None identified  [] Identified - Not appropriate at this time  []  Identified and addressed - refer to education log Learning Limitations:   [x] None identified  [] Identified Cultural, Anglican & ethnic food preferences:  [x] None identified    [] Identified and addressed ESTIMATED NUTRITION NEEDS:  
 
Calories: 4267-1834 kcal (MSJx1.2-1.5) based on  [x] Actual BW 53 kg     [] IBW Protein: 64-80 gm (1.2-1.5 gm/kg) based on  [x] Actual BW      [] IBW Fluid: 1 mL/kcal 
  
MONITORING & EVALUATION:  
 
Nutrition Goal(s): 1. Po intake of meals will meet >75% of patient estimated nutritional needs within the next 7 days. Outcome:  [] Met/Ongoing    []  Not Met    [x] New/Initial Goal  
 
Monitoring:   [x] Food and beverage intake   [x] Diet order   [x] Nutrition-focused physical findings   [x] Treatment/therapy   [] Weight   [] Enteral nutrition intake Previous Recommendations (for follow-up assessments only):     []   Implemented       []   Not Implemented (RD to address)      [] No Longer Appropriate     [] No Recommendation Made Discharge Planning: regular diet [x] Participated in care planning, discharge planning, & interdisciplinary rounds as appropriate Sheela Vasquez RD, [address]  Pager: [phone]

## 2018-09-27 NOTE — ROUTINE PROCESS
Bedside shift change report given to Radha Woodall RN (oncoming nurse) by Chastity Blount (offgoing nurse). Report included the following information SBAR, Intake/Output, Recent Results, Med Rec Status and Cardiac Rhythm NSR.

## 2018-09-27 NOTE — PROGRESS NOTES
Telemetry called stating patient had 4 beats VTACH - vitals stable; patient asymptomatic; will continue to monitor

## 2018-09-27 NOTE — PROGRESS NOTES
Respiratory Care Assessment for Bronchial hygiene or Lung Expansion Therapy Patient  Yolie Patrick     68 y.o.   male     9/27/2018  2:55 PM 
Patient Active Problem List  
Diagnosis Code  SOB (shortness of breath) R06.02  
 Hypoxia R09.02  
 Acute bronchitis with COPD (HCC) J44.0, J20.9  Pain of left hip joint M25.552  Fall at home [address] Camilo Diaz, Y92.009  Community acquired pneumonia J18.9 ABG: 
Date:9/27/2018 Lab Results Component Value Date/Time PHI 7.361 09/26/2018 09:23 PM  
 PCO2I 42.2 09/26/2018 09:23 PM  
 PO2I 55 (L) 09/26/2018 09:23 PM  
 HCO3I 23.9 09/26/2018 09:23 PM  
 FIO2I 21 09/26/2018 09:23 PM  
 
 
Chest X-ray: 
Date:9/27/2018 [place] [facility] [place] encounter on 09/26/18 XR FEMUR LT 2 V Narrative EXAM:  Left femur series. CLINICAL INDICATION:  Yuan Bennett. Left hip pain. Ecchymosis. COMPARISON:  None. TECHNIQUE:  AP and frog leg left hip. FINDINGS:   
 
No convincing evidence of acute fracture or subluxation is identified. No bony 
erosion or periosteal reaction is detected. Fairly prominent marginal 
osteophytes are noted around the left femoral head articular rim. Impression IMPRESSION:   
 
1. No acute fracture or subluxation. 2.  Osteoarthrosis of the left hip. Lab Test: 
Date:9/27/2018 WBC:  
Lab Results Component Value Date/Time WBC 8.4 09/27/2018 09:33 AM  
HGB: Lab Results Component Value Date/Time HGB 13.6 09/27/2018 09:33 AM  
 PLTS: Lab Results Component Value Date/Time PLATELET 747 99/78/6331 09:33 AM  
 
 
SaO2%/flow:  
SpO2 Readings from Last 1 Encounters:  
09/27/18 98% Vital Signs:   Patient Vitals for the past 8 hrs: 
 Temp Pulse Resp BP SpO2  
09/27/18 1104 97 °F (36.1 °C) 87 18 135/71 98 %  
09/27/18 0739 98.6 °F (37 °C) 91 18 134/75 95 % RA/O2 flow/device: Nasal cannula First Inital Assesment:    
[x]Yes []No  
Reevaluation/Reassessment:   
[]Yes [x]No  
 
CHART REVIEW Points 0 X 1 X 2 X 3 X 4 X Points Pulmonary History Smoking History (1) none  Recent Smoking History <1 PPD  Recent Smoking History >1 PPD  Pulmonary Disease or Impairment  Severe Pulmonary Disease  0 Surgical History No Surgery  General Surgery  Lower Abdominal  Thoracic or Upper Abdominal  Thoracic & Pulmonary Disease  0 CXR Clear or not indicated  Chronic changes or CXR Pending  Infiltrate, atelectasis or pleural effusions  Infiltrates in more than 1lobe  Infiltrates +atelectasis +/or pleural effusions  1 PATIENT ASSESSMENT  
 0 X 1 X 2 X 3 X 4 X Points Respiratory Pattern Regular pattern RR 12-20  Increased RR 21-27   Mild Dyspnea at rest, irregular pattern RR 28-32  Moderate Dyspnea at rest, Use of accessory muscles, RR 33-36  Severe Dyspnea, Use of accessory muscles RR >36  0 Mental Status Alert Oriented cooperative  Confused, Follows commands  Lethargic, Does not follow commands  Obtunded  Unresponsive  0 Breath Sounds Clear  Decreased Unilaterally  Decreased Bilaterally  Mild Scattered wheezing or Crackles in bases  Severe Wheezing or rhonchi  2 Cough Strong dry NPC  Strong Productive  Weak NPC  Weak productive or weak with rhonchi  No cough or may require suctioning  0 Level of Activity Ambulatory  Ambulatory with assistance  Temporarily Non-ambulatory  Non-Ambulatory, able to position self  Unable to position self, confined to bed  2 Total Points/Score:   5 Specific Intervention Chart() Bronchial Hygiene/Secretion Clearance:   
[]EZPAP []Rotation bed with vibration []CPT with percussor []CPT via vest  
[]Oscillastiang positive pressure expiratory device Lung Expansion:   
[]Incentive Spirometer w/RT visits []Incentive Spirometer w/nursing []EZPAP *Suctioning:   
[]Nasal Tracheal []Tracheal   
 *suctioning will be ordered and done PRN with an associated frequency such as QID/PRN based on score Other:   
Care Plan Level # Score Modality Frequency Comment Level 1 >17 Level 2 14-17 Level 3 10-13 Level 4 1-9 BRONCHIAL HYGIENE SCORING AND FREQUENCY GUIDELINES Frequency Indications/Findings Level # Q4 ATC Copious secretions, SOB, unable to sleep 1 QID & PRN at night Moderate amounts of secretions 2  
TID or Q6 wa Small amounts of secretions and poor cough: recent history of secretions 3 BID or Q8 wa Unable to deep breathe and cough effectively 4 Comments:   
 
Respiratory Therapist: Fransisca Healy, RRT

## 2018-09-27 NOTE — H&P
History & Physical 
 
Patient: Deena Roche MRN: [id number]  CSN: [id number] YOB: 1945  Age: 68 y.o. Sex: male DOA: 9/26/2018 HPI:  
 
Deena Roche is a 68 y.o. male who had been living with his mother, who was very recently placed in [address] [place] [address]. The patient presents to the ER stating he has been coughing and wheezing. He adds that he has fallen several times at home and has been \"stuck in a chair\" unable to get up due to pain in left leg after his last fall onto his legs. Reportedly family checked on him to find the patient siting in excrement in chair and called medics. In the ER the patient was hypoxic 82% pulse ox on room air and tachycardic and has required O2 4l nc now titrated to 2l for pulse ox 93%. Chest film suggested bilateral infiltrates and patient received Levaquin in ER and a CT chest that confirmed bronchitis and CAP. History reviewed. No pertinent past medical history. History reviewed. No pertinent surgical history. History reviewed. No pertinent family history. Social History Social History  Marital status: SINGLE Spouse name: N/A  
 Number of children: N/A  
 Years of education: N/A Social History Main Topics  Smoking status: None  Smokeless tobacco: None  Alcohol use None  Drug use: None  Sexual activity: Not Asked Other Topics Concern  None Social History Narrative  None Prior to Admission medications Not on File Allergies Allergen Reactions  Cat Dander Sneezing  Pollen Extracts Runny Nose Physical Exam:  
  
Visit Vitals  /85 (BP 1 Location: Right arm, BP Patient Position: At rest)  Pulse (!) 104  Temp 96.6 °F (35.9 °C)  Resp 22  Wt 46.2 kg (101 lb 12.8 oz)  SpO2 92% Physical Exam: 
GENERAL: alert, cooperative, mild distress HEENT: speech clear and goal directed, oriented x 3, no focal deficits, no JVD Chest: HR reg, tachy, lungs with diffuse rhonchi, inspiratory anterior wheeze, no use of accessory muscles, no cough Abdomen: soft, BS+, non tender Extremities: decreased ROM BLE, pain on abduction bilaterally, left lateral mid thigh with ecchymosis and tenderness, + pulses dp and pt bilaterally Lab/Data Review: 
Labs: Results:  
   
Chemistry Recent Labs  
   09/26/18 2035 GLU  90 NA  136  
K  4.7 CL  98* CO2  26 BUN  26* CREA  0.98  
CA  8.1* AGAP  12 BUCR  27* AP  103  
TP  8.6* ALB  2.6*  
GLOB  6.0* AGRAT  0.4* CBC w/Diff Recent Labs  
   09/26/18 1947 WBC  12.7 RBC  4.23* HGB  13.9 HCT  40.9 PLT  237 GRANS  85* LYMPH  6*  
EOS  0 Coagulation No results for input(s): PTP, INR, APTT in the last 72 hours. No lab exists for component: INREXT Iron/Ferritin No results for input(s): IRON in the last 72 hours. No lab exists for component: TIBCCALC BNP No results for input(s): BNPP in the last 72 hours. Cardiac Enzymes Recent Labs  
   09/26/18 2035 CPK  42 CKND1  7.1* Liver Enzymes Recent Labs  
   09/26/18 2035  
TP  8.6* ALB  2.6* AP  103 SGOT  15 Thyroid Studies Lab Results Component Value Date/Time TSH 0.82 04/04/2011 02:17 AM  
    
 
All Micro Results Procedure Component Value Units Date/Time CULTURE, BLOOD [[id number]] Collected:  09/26/18 2201 Order Status:  Completed Specimen:  Blood from Blood Updated:  09/26/18 2352 CULTURE, BLOOD [[id number]] Collected:  09/26/18 2145 Order Status:  Completed Specimen:  Blood from Blood Updated:  09/26/18 2352 Imaging Reviewed: CTA Chest 9/26/2018 IMPRESSION: 
1. No evidence for pulmonary emboli. 2.  Bronchitis, bronchiolitis with severe cystic bronchiectasis worse in the 
bilateral lower lobes with bilateral lung base pneumonia and bilateral lower 
lobe cysts with air-fluid levels. 3.  Asbestos related pleural disease. 4.  Right atrial and right ventricle enlargement. 5.  Aberrant right subclavian artery. Assessment:  
 
Hospital Problems  Date Reviewed: 9/27/2018 Codes Class Noted POA Pain of left hip joint ICD-10-CM: M25.552 ICD-9-CM: 719.45  9/27/2018 Yes Fall at home ICD-10-CM: [address] Adolfo Karimi, Y92.009 ICD-9-CM: E888.9, E849.0  9/27/2018 Yes * (Principal)Community acquired pneumonia ICD-10-CM: J18.9 ICD-9-CM: 534  9/27/2018 Yes  
   
 SOB (shortness of breath) ICD-10-CM: R06.02 
ICD-9-CM: 786.05  9/26/2018 Yes Hypoxia ICD-10-CM: R09.02 
ICD-9-CM: 799.02  9/26/2018 Yes [facility] ICD-10-CM: J44.0, J20.9 ICD-9-CM: 491.22  9/26/2018 Yes Plan:  
Ceftriaxone 2 gm daily Zithromax 500 mg daily x 3 
 continue prednisone 60 mg daily x 2 more days and re-assess Xray pelvis and left femur Consult case management If xrays unremarkable, consider MRI Patient requests DNR Dejuan Singh DO 
9/27/2018, 6:38 AM

## 2018-09-27 NOTE — ROUTINE PROCESS
TRANSFER - OUT REPORT: 
 
Verbal report given to Doreen Grijalva RN (name) on Lexi Roberts  being transferred to [address] [place] [address] ([facility]) for routine progression of care Report consisted of patients Situation, Background, Assessment and  
Recommendations(SBAR). Information from the following report(s) SBAR, ED Summary and MAR was reviewed with the receiving nurse. Lines:  
Peripheral IV 09/26/18 Right Wrist (Active) Site Assessment Clean, dry, & intact 9/26/2018  7:02 PM  
Dressing Type Transparent 9/26/2018  7:02 PM  
Hub Color/Line Status Patent 9/26/2018  7:02 PM  
   
Peripheral IV 09/26/18 Left Antecubital (Active) Site Assessment Clean, dry, & intact 9/26/2018 10:05 PM  
Phlebitis Assessment 0 9/26/2018 10:05 PM  
Infiltration Assessment 0 9/26/2018 10:05 PM  
Dressing Status Clean, dry, & intact 9/26/2018 10:05 PM  
Dressing Type Transparent 9/26/2018 10:05 PM  
Hub Color/Line Status Patent; Flushed 9/26/2018 10:05 PM  
Action Taken Blood drawn 9/26/2018 10:05 PM  
  
 
Opportunity for questions and clarification was provided. Patient transported with: 
 Monitor O2 @ 2 liters Registered Nurse

## 2018-09-27 NOTE — PROGRESS NOTES
Dysphagia eval/tx completed with recs of reg solid diet and thin liquids, meds as tolerated. Rec MBS next business date to further assess oropharyngeal swallow fxn and r/o silent aspiration. Full report to follow. Thank you for this referral. 
 
Angela Crowe M.S. CCC-SLP/L Speech-Language Pathologist

## 2018-09-27 NOTE — PROGRESS NOTES
Important Message from [address]" reviewed and explained with the patient and/or representative at bedside and signature was obtained. A signed copy provided to patient/representative. Original signed document placed in patient's chart. Pt could not sign because he did not have his glassed. Copy given him and copy filed in his chart.

## 2018-09-27 NOTE — PROGRESS NOTES
Hospitalist Progress Note Patient: Deena Roche MRN: [id number]  CSN: [id number] YOB: 1945  Age: 68 y.o. Sex: male DOA: 9/26/2018 LOS:  LOS: 1 day Patient denies dysphagia, denies eyes watering with po, denies wheezing with po. Brother at bedside states he's not sure, but patient cough when he lays flat. Patient denies pain anywhere. Reports unintentional weight loss. Minimal non productive cough. Had BM yesterday. 1/4 blood cx + GPC groups. Assessment/Plan 1. Hypoxia 82% on RA in ED. tx underlying infxn,  
2. CAP - ceftri, azithro - blood cx (9/26) ngtd. RT for bronchial hygiene protocol. Mucinex. 3. Severe sepsis poa (tachycardia, hypoxia) with evidence of end organ damage (lactic acidosis, elevated trop) - source m/l lungs 4. Lactic acidosis - recheck 5. Elevated trop - recheck . Check echo. 6. Bronchitis, bronchiolitis with severe cystic bronchiectasis worse in the bilateral lower lobes with bilateral lung base pneumonia and bilateral lower 
lobe cysts with air-fluid levels. Consider pulm consult. 7. Falls at home 8. xr findings suspicious for pelvic fracture - CT pelvis 9. Orthopnea - check echo 10. Ambulatory dysfunction - found at home sitting in excrement. Pt / ot when appropriate. 11. Difficulty caring for himself at home. Has resided w/ his Mom for 67 years, mom recently went to [place] [address] rehab, family interested in them having shared room. 12. uses his mom's walker sometimes and does not have any other DMEs 13. Underweight Body mass index is 14.2 kg/(m^2). Nutritionist consult. multivit 14. 1/4 blood cx + GPC groups. Follow s/s and repeat blood cx 15. DNR. No durable DNR on file. brother Diana Swain [phone] Additional Notes:   
 
Case discussed with:  [x]Patient  [x]Family  [x]Nursing  [x]Case Management DVT Prophylaxis:  []Lovenox  [x]Hep SQ  []SCDs  []Coumadin   []On Heparin gtt Vital signs/Intake and Output: 
Visit Vitals  /71 (BP 1 Location: Right arm, BP Patient Position: At rest)  Pulse 87  Temp 97 °F (36.1 °C)  Resp 18  Ht 5' 11\" (1.803 m)  Wt 46.2 kg (101 lb 12.8 oz)  SpO2 98%  BMI 14.2 kg/m2 Current Shift:    
Last three shifts:  09/25 1901 - 09/27 0700 In: -  
Out: 055 [[id number]] Thin, generally weak, disheveled. Ncat. Perrl. Poor dentition, poor oral hygiene RRR Rhonchi to both bases, symmetrical chest expansion. No dullness to percussion 
abd soft nt nd nabs No edema. dp 2+ b.l No focal deficit. Generalized weakness No rash Medications Reviewed Labs: Results:  
   
Chemistry Recent Labs  
   09/27/18 5707  09/26/18 2035 GLU  107*  90 NA  136  136  
K  4.4  4.7 CL  100  98* CO2  25  26 BUN  19*  26* CREA  0.71  0.98  
CA  8.0*  8.1* AGAP  11  12 BUCR  27*  27* AP   --   103 TP   --   8.6* ALB   --   2.6*  
GLOB   --   6.0* AGRAT   --   0.4* CBC w/Diff Recent Labs  
   09/27/18 
 0933 09/26/18 
 1947 WBC  8.4  12.7 RBC  4.11*  4.23* HGB  13.6  13.9 HCT  40.2  40.9 PLT  173  237 GRANS   --   85* LYMPH   --   6*  
EOS   --   0 Cardiac Enzymes Recent Labs  
   09/27/18 0933 09/26/18 2035 CPK  46  42 CKND1  6.7*  7.1* Coagulation No results for input(s): PTP, INR, APTT in the last 72 hours. No lab exists for component: INREXT Lipid Panel Lab Results Component Value Date/Time Cholesterol, total 112 04/04/2011 02:17 AM  
 HDL Cholesterol <10 (L) 04/04/2011 02:17 AM  
 LDL, calculated NOT CALCULATED DUE TO LOW HDLC LEVEL 04/04/2011 02:17 AM  
 VLDL, calculated  04/04/2011 02:17 AM  
  Calculation not valid with this patient's other Lipid values. Triglyceride 327 (H) 04/04/2011 02:17 AM  
 CHOL/HDL Ratio NOT CALCULATED DUE TO LOW HDLC LEVEL 04/04/2011 02:17 AM  
  
BNP No results for input(s): BNPP in the last 72 hours. Liver Enzymes Recent Labs 09/26/18 2035  
TP  8.6* ALB  2.6* AP  103 SGOT  15 Thyroid Studies Lab Results Component Value Date/Time TSH 0.82 04/04/2011 02:17 AM  
    
Procedures/imaging: see electronic medical records for all procedures/Xrays and details which were not copied into this note but were reviewed prior to creation of Plan.

## 2018-09-28 ENCOUNTER — APPOINTMENT (OUTPATIENT)
Dept: NON INVASIVE DIAGNOSTICS | Age: 73
DRG: 871 | End: 2018-09-28
Attending: HOSPITALIST
Payer: MEDICARE

## 2018-09-28 ENCOUNTER — APPOINTMENT (OUTPATIENT)
Dept: GENERAL RADIOLOGY | Age: 73
DRG: 871 | End: 2018-09-28
Attending: HOSPITALIST
Payer: MEDICARE

## 2018-09-28 ENCOUNTER — APPOINTMENT (OUTPATIENT)
Dept: CT IMAGING | Age: 73
DRG: 871 | End: 2018-09-28
Attending: HOSPITALIST
Payer: MEDICARE

## 2018-09-28 LAB
ANION GAP SERPL CALC-SCNC: 11 MMOL/L (ref 3–18)
BASOPHILS # BLD: 0 K/UL (ref 0–0.1)
BASOPHILS NFR BLD: 0 % (ref 0–2)
BUN SERPL-MCNC: 19 MG/DL (ref 7–18)
BUN/CREAT SERPL: 28 (ref 12–20)
CALCIUM SERPL-MCNC: 7.8 MG/DL (ref 8.5–10.1)
CHLORIDE SERPL-SCNC: 100 MMOL/L (ref 100–108)
CO2 SERPL-SCNC: 23 MMOL/L (ref 21–32)
CREAT SERPL-MCNC: 0.69 MG/DL (ref 0.6–1.3)
DIFFERENTIAL METHOD BLD: ABNORMAL
ECHO AO ROOT DIAM: 3.93 CM
ECHO AV REGURGITANT PHT: 594.6 CM
ECHO IVC SNIFF: 0.88 CM
ECHO LA AREA 4C: 13.6 CM2
ECHO LA VOL 2C: 53.38 ML (ref 18–58)
ECHO LA VOL 4C: 26.74 ML (ref 18–58)
ECHO LA VOL BP: 41.75 ML (ref 18–58)
ECHO LA VOL/BSA BIPLANE: 26.36 ML/M2
ECHO LA VOLUME INDEX A2C: 33.7 ML/M2
ECHO LA VOLUME INDEX A4C: 16.88 ML/M2
ECHO LV INTERNAL DIMENSION DIASTOLIC: 2.45 CM (ref 4.2–5.9)
ECHO LV INTERNAL DIMENSION SYSTOLIC: 1.93 CM
ECHO LV IVSD: 1.62 CM (ref 0.6–1)
ECHO LV MASS 2D: 142.6 G (ref 88–224)
ECHO LV MASS INDEX 2D: 90 G/M2
ECHO LV POSTERIOR WALL DIASTOLIC: 1.41 CM (ref 0.6–1)
ECHO LVOT DIAM: 2.22 CM
ECHO LVOT PEAK GRADIENT: 2.5 MMHG
ECHO LVOT PEAK VELOCITY: 78.43 CM/S
ECHO LVOT VTI: 16.21 CM
ECHO MV A VELOCITY: 119.58 CM/S
ECHO MV E DECELERATION TIME (DT): 246.9 MS
ECHO MV E VELOCITY: 0.85 CM/S
ECHO MV E/A RATIO: 0.01
ECHO PVEIN A VELOCITY: 0.07 CM/S
ECHO PVEIN PEAK D VELOCITY: 0.29 CM/S
ECHO PVEIN S/D RATIO: 94.7
ECHO TV REGURGITANT MAX VELOCITY: 334.89 CM/S
ECHO TV REGURGITANT PEAK GRADIENT: 44.9 MMHG
EOSINOPHIL # BLD: 0 K/UL (ref 0–0.4)
EOSINOPHIL NFR BLD: 0 % (ref 0–5)
ERYTHROCYTE [DISTWIDTH] IN BLOOD BY AUTOMATED COUNT: 13.3 % (ref 11.6–14.5)
FOLATE SERPL-MCNC: 14.3 NG/ML (ref 3.1–17.5)
GLUCOSE SERPL-MCNC: 83 MG/DL (ref 74–99)
HCT VFR BLD AUTO: 40.9 % (ref 36–48)
HGB BLD-MCNC: 13.7 G/DL (ref 13–16)
LYMPHOCYTES # BLD: 0.8 K/UL (ref 0.9–3.6)
LYMPHOCYTES NFR BLD: 10 % (ref 21–52)
MAGNESIUM SERPL-MCNC: 1.9 MG/DL (ref 1.6–2.6)
MCH RBC QN AUTO: 33.3 PG (ref 24–34)
MCHC RBC AUTO-ENTMCNC: 33.5 G/DL (ref 31–37)
MCV RBC AUTO: 99.3 FL (ref 74–97)
MONOCYTES # BLD: 1 K/UL (ref 0.05–1.2)
MONOCYTES NFR BLD: 12 % (ref 3–10)
NEUTS SEG # BLD: 6.6 K/UL (ref 1.8–8)
NEUTS SEG NFR BLD: 78 % (ref 40–73)
PHOSPHATE SERPL-MCNC: 2.1 MG/DL (ref 2.5–4.9)
PISA AR MAX VEL: 233.15 CM/S
PLATELET # BLD AUTO: 147 K/UL (ref 135–420)
PMV BLD AUTO: 11.4 FL (ref 9.2–11.8)
POTASSIUM SERPL-SCNC: 4.5 MMOL/L (ref 3.5–5.5)
RBC # BLD AUTO: 4.12 M/UL (ref 4.7–5.5)
SODIUM SERPL-SCNC: 134 MMOL/L (ref 136–145)
T4 FREE SERPL-MCNC: 1.1 NG/DL (ref 0.7–1.5)
TROPONIN I SERPL-MCNC: 0.06 NG/ML (ref 0–0.04)
TSH SERPL DL<=0.05 MIU/L-ACNC: 0.69 UIU/ML (ref 0.36–3.74)
VIT B12 SERPL-MCNC: 478 PG/ML (ref 211–911)
WBC # BLD AUTO: 8.5 K/UL (ref 4.6–13.2)

## 2018-09-28 PROCEDURE — 74011000250 HC RX REV CODE- 250: Performed by: HOSPITALIST

## 2018-09-28 PROCEDURE — 65660000000 HC RM CCU STEPDOWN

## 2018-09-28 PROCEDURE — 84439 ASSAY OF FREE THYROXINE: CPT | Performed by: HOSPITALIST

## 2018-09-28 PROCEDURE — 74011250637 HC RX REV CODE- 250/637: Performed by: HOSPITALIST

## 2018-09-28 PROCEDURE — 84443 ASSAY THYROID STIM HORMONE: CPT | Performed by: HOSPITALIST

## 2018-09-28 PROCEDURE — 87040 BLOOD CULTURE FOR BACTERIA: CPT | Performed by: HOSPITALIST

## 2018-09-28 PROCEDURE — 92526 ORAL FUNCTION THERAPY: CPT

## 2018-09-28 PROCEDURE — 83735 ASSAY OF MAGNESIUM: CPT | Performed by: HOSPITALIST

## 2018-09-28 PROCEDURE — 74011636637 HC RX REV CODE- 636/637: Performed by: HOSPITALIST

## 2018-09-28 PROCEDURE — 85025 COMPLETE CBC W/AUTO DIFF WBC: CPT | Performed by: HOSPITALIST

## 2018-09-28 PROCEDURE — 74011000258 HC RX REV CODE- 258: Performed by: INTERNAL MEDICINE

## 2018-09-28 PROCEDURE — 74011250636 HC RX REV CODE- 250/636: Performed by: HOSPITALIST

## 2018-09-28 PROCEDURE — 74011000255 HC RX REV CODE- 255: Performed by: HOSPITALIST

## 2018-09-28 PROCEDURE — 82306 VITAMIN D 25 HYDROXY: CPT | Performed by: HOSPITALIST

## 2018-09-28 PROCEDURE — 82607 VITAMIN B-12: CPT | Performed by: HOSPITALIST

## 2018-09-28 PROCEDURE — 84484 ASSAY OF TROPONIN QUANT: CPT | Performed by: HOSPITALIST

## 2018-09-28 PROCEDURE — 36415 COLL VENOUS BLD VENIPUNCTURE: CPT | Performed by: HOSPITALIST

## 2018-09-28 PROCEDURE — 93306 TTE W/DOPPLER COMPLETE: CPT

## 2018-09-28 PROCEDURE — 72192 CT PELVIS W/O DYE: CPT

## 2018-09-28 PROCEDURE — 74230 X-RAY XM SWLNG FUNCJ C+: CPT

## 2018-09-28 PROCEDURE — 84100 ASSAY OF PHOSPHORUS: CPT | Performed by: HOSPITALIST

## 2018-09-28 PROCEDURE — 77010033678 HC OXYGEN DAILY

## 2018-09-28 PROCEDURE — 80048 BASIC METABOLIC PNL TOTAL CA: CPT | Performed by: HOSPITALIST

## 2018-09-28 PROCEDURE — 92611 MOTION FLUOROSCOPY/SWALLOW: CPT

## 2018-09-28 PROCEDURE — 74011250636 HC RX REV CODE- 250/636: Performed by: INTERNAL MEDICINE

## 2018-09-28 RX ORDER — ENOXAPARIN SODIUM 100 MG/ML
40 INJECTION SUBCUTANEOUS EVERY 24 HOURS
Status: DISCONTINUED | OUTPATIENT
Start: 2018-09-28 | End: 2018-10-08 | Stop reason: HOSPADM

## 2018-09-28 RX ORDER — SODIUM,POTASSIUM PHOSPHATES 280-250MG
1 POWDER IN PACKET (EA) ORAL 2 TIMES DAILY
Status: COMPLETED | OUTPATIENT
Start: 2018-09-28 | End: 2018-09-30

## 2018-09-28 RX ADMIN — AZITHROMYCIN 500 MG: 250 TABLET, FILM COATED ORAL at 10:35

## 2018-09-28 RX ADMIN — AMPICILLIN SODIUM AND SULBACTAM SODIUM 3 G: 2; 1 INJECTION, POWDER, FOR SOLUTION INTRAMUSCULAR; INTRAVENOUS at 17:23

## 2018-09-28 RX ADMIN — BARIUM SULFATE 45 G: 960 POWDER, FOR SUSPENSION ORAL at 10:00

## 2018-09-28 RX ADMIN — HEPARIN SODIUM 5000 UNITS: 5000 INJECTION, SOLUTION INTRAVENOUS; SUBCUTANEOUS at 10:35

## 2018-09-28 RX ADMIN — AMPICILLIN SODIUM AND SULBACTAM SODIUM 3 G: 2; 1 INJECTION, POWDER, FOR SOLUTION INTRAMUSCULAR; INTRAVENOUS at 23:20

## 2018-09-28 RX ADMIN — BARIUM SULFATE 700 MG: 700 TABLET ORAL at 10:00

## 2018-09-28 RX ADMIN — PREDNISONE 60 MG: 20 TABLET ORAL at 13:08

## 2018-09-28 RX ADMIN — GUAIFENESIN 600 MG: 600 TABLET, EXTENDED RELEASE ORAL at 10:35

## 2018-09-28 RX ADMIN — BARIUM SULFATE 45 ML: 400 SUSPENSION ORAL at 10:00

## 2018-09-28 RX ADMIN — WATER 2 G: 1 INJECTION INTRAMUSCULAR; INTRAVENOUS; SUBCUTANEOUS at 13:09

## 2018-09-28 RX ADMIN — FAMOTIDINE 20 MG: 20 TABLET ORAL at 10:35

## 2018-09-28 RX ADMIN — THERA TABS 1 TABLET: TAB at 10:35

## 2018-09-28 RX ADMIN — ENOXAPARIN SODIUM 40 MG: 100 INJECTION SUBCUTANEOUS at 17:23

## 2018-09-28 RX ADMIN — FAMOTIDINE 20 MG: 20 TABLET ORAL at 17:24

## 2018-09-28 RX ADMIN — POTASSIUM & SODIUM PHOSPHATES POWDER PACK 280-160-250 MG 1 PACKET: 280-160-250 PACK at 17:23

## 2018-09-28 RX ADMIN — BARIUM SULFATE 30 ML: 400 PASTE ORAL at 10:00

## 2018-09-28 RX ADMIN — GUAIFENESIN 600 MG: 600 TABLET, EXTENDED RELEASE ORAL at 20:12

## 2018-09-28 NOTE — CONSULTS
Patient: Franklin Del Rosario                MRN: [id number]       SSN: [id number]  YOB: 1945        AGE: 68 y.o. SEX: male  Body mass index is 14.23 kg/(m^2). PCP: None  09/28/18    Chief Complaint: Right hip/leg pain    HPI: Kvng Matthews is a 68year old male who presented to the ER after being found in a chair by family members which he said occurred after a fall. He said he has had several falls recently and the most recent he fell onto his right side. He was able to get to his chair, but was unable to get up following that. He had right hip pain, difficulty breathing and was brought to the hospital via EMS. He was found to be hyppxoc with possible pnemonia on chest x ray, admitted and placed on antiobiotics. X rays and CT scan of the pelvis showed a pelvic ring injury. Orthopedic surgery consulted. No previous hip or groin pain prior to the fall. No previous pelvic or femur surgery. History reviewed. No pertinent past medical history. History reviewed. No pertinent family history.     Current Facility-Administered Medications   Medication Dose Route Frequency Provider Last Rate Last Dose    potassium, sodium phosphates (NEUTRA-PHOS) packet 1 Packet  1 Packet Oral BID Eduin Ruth MD        ampicillin-sulbactam (UNASYN) 3 g in 0.9% sodium chloride (MBP/ADV) 100 mL MBP  3 g IntraVENous Q6H Micheal Archuleta MD        influenza vaccine 2018-19 (6 mos+)(PF) (FLUARIX QUAD/FLULAVAL QUAD) injection 0.5 mL  0.5 mL IntraMUSCular PRIOR TO DISCHARGE Lucero Ohara,         acetaminophen (TYLENOL) tablet 650 mg  650 mg Oral Q4H PRN Lucero Ohara DO   650 mg at 09/27/18 2048    ondansetron (ZOFRAN ODT) tablet 4 mg  4 mg Oral Q6H PRN Lucero Ohara, DO        heparin (porcine) injection 5,000 Units  5,000 Units SubCUTAneous Q8H Lucero Ohara, DO   5,000 Units at 09/28/18 1035    famotidine (PEPCID) tablet 20 mg  20 mg Oral BID Lucero Ohara, DO   20 mg at 09/28/18 1035    azithromycin (ZITHROMAX) tablet 500 mg  500 mg Oral DAILY Dorian Pascual, DO   500 mg at 09/28/18 1035    therapeutic multivitamin (THERAGRAN) tablet 1 Tab  1 Tab Oral DAILY Nico Moser MD   1 Tab at 09/28/18 1035    guaiFENesin ER (MUCINEX) tablet 600 mg  600 mg Oral Q12H Nico Moser MD   600 mg at 09/28/18 1035       Allergies   Allergen Reactions    Cat Dander Sneezing    Pollen Extracts Runny Nose       History reviewed. No pertinent surgical history. Social History     Social History    Marital status: SINGLE     Spouse name: N/A    Number of children: N/A    Years of education: N/A     Occupational History    Not on file. Social History Main Topics    Smoking status: Not on file    Smokeless tobacco: Not on file    Alcohol use Not on file    Drug use: Not on file    Sexual activity: Not on file     Other Topics Concern    Not on file     Social History Narrative    No narrative on file       REVIEW OF SYSTEMS:      CON: negative for recent weight loss/gain, fever, or chills  EYE: negative for double or blurry vision  ENT: negative for hoarseness  RS:   negative for cough, URI, SOB  CV:  negative for chest pain, palpitations  GI:    negative for blood in stool, nausea/vomiting  :  negative for blood in urine  MS: As per HPI  Other systems reviewed and noted below. PHYSICAL EXAMINATION:  Visit Vitals    /60 (BP 1 Location: Right arm, BP Patient Position: At rest)    Pulse 95    Temp 97 °F (36.1 °C)    Resp 18    Ht 5' 11\" (1.803 m)    Wt 102 lb (46.3 kg)    SpO2 97%    BMI 14.23 kg/m2     Body mass index is 14.23 kg/(m^2). GENERAL: Alert and oriented x3, thin male, no active distress  HEENT: Normocephalic, atraumatic, oxygen in nose. Plaque like skin changes on forehead/cheeks. Sunken in facial musculature. RESP: Non labored breathing with equal chest rise on inspiration. Wheezing audible on breathing   CV: Well perfused extremities.   No cyanosis or clubbing noted. ABDOMEN: Soft, non-tender, non-distended. SKIN: Multiple senile purpura noted over the forearms/wrist/hands  MS: no signs of trauma to right hip/thigh. No ecchymosis or bleeding. No open wounds. Tender to palpation over the right iliac wing. Pain with lateral compression of pelvic wings. No pain with hip flexion/extension/internal rotation/external rotation. SILT distally. No crepitus with pelvic wing palpation or movement. Imaging: AP pelvis with right pubic rami fracture as well as minimally displaced right iliac wing fracture. C T scan Pelvis: minimally displaced, likely acute, right iliac wing and right inferior and superior pubic rami fractures. Fracture lines do not appear to extend into acetabulum. CBC WITH AUTOMATED DIFF    Collection Time: 09/28/18  5:47 AM   Result Value Ref Range    WBC 8.5 4.6 - 13.2 K/uL    RBC 4.12 (L) 4.70 - 5.50 M/uL    HGB 13.7 13.0 - 16.0 g/dL    HCT 40.9 36.0 - 48.0 %    MCV 99.3 (H) 74.0 - 97.0 FL    MCH 33.3 24.0 - 34.0 PG    MCHC 33.5 31.0 - 37.0 g/dL    RDW 13.3 11.6 - 14.5 %    PLATELET 693 079 - 815 K/uL    MPV 11.4 9.2 - 11.8 FL    NEUTROPHILS 78 (H) 40 - 73 %    LYMPHOCYTES 10 (L) 21 - 52 %    MONOCYTES 12 (H) 3 - 10 %    EOSINOPHILS 0 0 - 5 %    BASOPHILS 0 0 - 2 %    ABS. NEUTROPHILS 6.6 1.8 - 8.0 K/UL    ABS. LYMPHOCYTES 0.8 (L) 0.9 - 3.6 K/UL    ABS. MONOCYTES 1.0 0.05 - 1.2 K/UL    ABS. EOSINOPHILS 0.0 0.0 - 0.4 K/UL    ABS.  BASOPHILS 0.0 0.0 - 0.1 K/UL    DF AUTOMATED       Recent Results (from the past 24 hour(s))   TROPONIN I    Collection Time: 09/27/18  7:15 PM   Result Value Ref Range    Troponin-I, Qt. 0.06 (H) 0.0 - 0.045 NG/ML   TROPONIN I    Collection Time: 09/28/18  5:47 AM   Result Value Ref Range    Troponin-I, Qt. 0.06 (H) 0.0 - 0.928 NG/ML   METABOLIC PANEL, BASIC    Collection Time: 09/28/18  5:47 AM   Result Value Ref Range    Sodium 134 (L) 136 - 145 mmol/L    Potassium 4.5 3.5 - 5.5 mmol/L Chloride 100 100 - 108 mmol/L    CO2 23 21 - 32 mmol/L    Anion gap 11 3.0 - 18 mmol/L    Glucose 83 74 - 99 mg/dL    BUN 19 (H) 7.0 - 18 MG/DL    Creatinine 0.69 0.6 - 1.3 MG/DL    BUN/Creatinine ratio 28 (H) 12 - 20      GFR est AA >60 >60 ml/min/1.73m2    GFR est non-AA >60 >60 ml/min/1.73m2    Calcium 7.8 (L) 8.5 - 10.1 MG/DL   MAGNESIUM    Collection Time: 09/28/18  5:47 AM   Result Value Ref Range    Magnesium 1.9 1.6 - 2.6 mg/dL   CBC WITH AUTOMATED DIFF    Collection Time: 09/28/18  5:47 AM   Result Value Ref Range    WBC 8.5 4.6 - 13.2 K/uL    RBC 4.12 (L) 4.70 - 5.50 M/uL    HGB 13.7 13.0 - 16.0 g/dL    HCT 40.9 36.0 - 48.0 %    MCV 99.3 (H) 74.0 - 97.0 FL    MCH 33.3 24.0 - 34.0 PG    MCHC 33.5 31.0 - 37.0 g/dL    RDW 13.3 11.6 - 14.5 %    PLATELET 850 244 - 615 K/uL    MPV 11.4 9.2 - 11.8 FL    NEUTROPHILS 78 (H) 40 - 73 %    LYMPHOCYTES 10 (L) 21 - 52 %    MONOCYTES 12 (H) 3 - 10 %    EOSINOPHILS 0 0 - 5 %    BASOPHILS 0 0 - 2 %    ABS. NEUTROPHILS 6.6 1.8 - 8.0 K/UL    ABS. LYMPHOCYTES 0.8 (L) 0.9 - 3.6 K/UL    ABS. MONOCYTES 1.0 0.05 - 1.2 K/UL    ABS. EOSINOPHILS 0.0 0.0 - 0.4 K/UL    ABS.  BASOPHILS 0.0 0.0 - 0.1 K/UL    DF AUTOMATED     PHOSPHORUS    Collection Time: 09/28/18  5:47 AM   Result Value Ref Range    Phosphorus 2.1 (L) 2.5 - 4.9 MG/DL   TSH 3RD GENERATION    Collection Time: 09/28/18  5:47 AM   Result Value Ref Range    TSH 0.69 0.36 - 3.74 uIU/mL   T4, FREE    Collection Time: 09/28/18  5:47 AM   Result Value Ref Range    T4, Free 1.1 0.7 - 1.5 NG/DL   VITAMIN B12 & FOLATE    Collection Time: 09/28/18  5:47 AM   Result Value Ref Range    Vitamin B12 478 211 - 911 pg/mL    Folate 14.3 3.10 - 17.50 ng/mL   ECHO ADULT COMPLETE    Collection Time: 09/28/18 10:13 AM   Result Value Ref Range    LA Volume 41.75 18 - 58 mL    Ao Root D 3.93 cm    LVIDd 2.45 (A) 4.2 - 5.9 cm    LVPWd 1.41 (A) 0.6 - 1.0 cm    LVIDs 1.93 cm    IVSd 1.62 (A) 0.6 - 1.0 cm    LVOT d 2.22 cm    LVOT Peak Velocity 78.43 cm/s    LVOT Peak Gradient 2.5 mmHg    LVOT VTI 16.21 cm    MV A Merlin 119.58 cm/s    MV E Merlin 0.85 cm/s    MV E/A 0.01     PULV S/D 94.7     Inferior Vena Cava Diameter Sniffing 0.88 cm    LA Vol 4C 26.74 18 - 58 mL    LA Vol 2C 53.38 18 - 58 mL    LA Area 4C 13.6 cm2    LV Mass .6 88 - 224 g    LV Mass AL Index 90.0 g/m2    Mitral Valve E Wave Deceleration Time 246.9 ms    Triscuspid Valve Regurgitation Peak Gradient 44.9 mmHg    Pulmonary Vein \"A\" Wave Velocity 0.07 cm/s    Aortic Regurgitant Pressure Half-time 594.6 cm    Pulm Vein Peak D Velocity 0.29 cm/s    TR Max Velocity 334.89 cm/s    LA Vol Index 26.36 ml/m2    LA Vol Index 33.70 ml/m2    LA Vol Index 16.88 ml/m2    AR Max Merlin 233.15 cm/s       A/P: 68year old male presented after fall with right LC2 pelvic ring injury  -Recommend non operative treatment of injury  -PO pain control, limit narcotics if possible  -PT: Toe Touch Weight bearing right lower extremity at this time  -Anticoagulation x 4 weeks for pelvic ring injury increased risk of DVT, recommend lovenox x 4 weeks once daily but [facility] [place] decision to medicine  -Pneumonia treatment per medicine  -Mobilize patient as tolerated  -No further imaging recommended at this time  -Will continue to follow patient in house  -Thank you for consult      Electronically signed by: Herbert Harmon MD

## 2018-09-28 NOTE — NURSE NAVIGATOR
Important message attempted. Patient states he cant see without his glasses and \"I have a POA, its my brother Dre\". Rose Marie Samara Savage provided number of [phone], he also verbalized consent for CM to call. CM called and left message.

## 2018-09-28 NOTE — PROGRESS NOTES
Hospitalist Progress Note Patient: Nelly Benitez MRN: [id number]  CSN: [id number] YOB: 1945  Age: 68 y.o. Sex: male DOA: 9/26/2018 LOS:  LOS: 2 days Family at bedside has brought in [place] for scanning into cc. They express understanding patient has been aspirating; still interested in placement. Patient states he feels a little better than yesterday. Assessment/Plan 1. Hypoxia 82% on RA in ED. 2. CAP - ceftri, azithro - blood cx (9/26) ngtd. RT for bronchial hygiene protocol. Mucinex. 3. Severe sepsis poa (tachycardia, hypoxia) with evidence of end organ damage (lactic acidosis, elevated trop) - source m/l lungs 4. Lactic acidosis - resolved. 5. Elevated trop - flat, not c/w ACS. Echo noted. 6. Bronchitis, bronchiolitis with severe cystic bronchiectasis worse in the bilateral lower lobes with bilateral lung base pneumonia and bilateral lower 
lobe cysts with air-fluid levels. ID consult. 7. Falls at home 8. xr findings suspicious for pelvic fracture - CT pelvis 9. Echo 9/28/19 EF 60%, LV moderate concentric hypertrophy, no RWMA, grade 1 DD. Mild TR. Mild - mod pulmonary hypertension. Mild AR. 10. Ambulatory dysfunction - found at home sitting in excrement. Pt / ot when appropriate. 11. Difficulty caring for himself at home. Has resided w/ his Mom for 67 years, mom recently went to [facility] re[facility], family interested in them having shared room (per CM Apurva emanuel accomodate). 12. mod pharyngeal dysphagia c/b silent penetration to laryngeal vestibule with thin liquid - s/p MBS - SLP recs noted. will require ST services upon DC from this facility. 13. Underweight Body mass index is 14.23 kg/(m^2). Nutritionist consult. multivit 14. Staph aureus bsi - repeat blood cx (9/28) pending - ID to consult. 15. Hypophosphatemia replete as needed. 16. DNR. No durable DNR on file. brother Sarahy Walsh [phone] Additional Notes: Case discussed with:  [x]Patient  [x]Family  [x]Nursing  []Case Management DVT Prophylaxis:  []Lovenox  [x]Hep SQ  []SCDs  []Coumadin   []On Heparin gtt Vital signs/Intake and Output: 
Visit Vitals  /60 (BP 1 Location: Right arm, BP Patient Position: At rest)  Pulse 95  Temp 97 °F (36.1 °C)  Resp 18  Ht 5' 11\" (1.803 m)  Wt 46.3 kg (102 lb)  SpO2 97%  BMI 14.23 kg/m2 Current Shift:    
Last three shifts:  09/26 1901 - 09/28 0700 In: -  
Out: [address] Thin, generally weak, disheveled. Ncat. Perrl. Poor dentition, poor oral hygiene RRR Rhonchi to both bases, symmetrical chest expansion. No dullness to percussion 
abd soft nt nd nabs No edema. dp 2+ b.l No focal deficit. Generalized weakness No rash Medications Reviewed Labs: Results:  
   
Chemistry Recent Labs  
   09/28/18 
 3413  09/27/18 
 4074  09/26/18 2035 GLU  83  107*  90 NA  134*  136  136  
K  4.5  4.4  4.7 CL  100  100  98* CO2  23  25  26 BUN  19*  19*  26* CREA  0.69  0.71  0.98  
CA  7.8*  8.0*  8.1* AGAP  11  11  12 BUCR  28*  27*  27* AP   --    --   103 TP   --    --   8.6* ALB   --    --   2.6*  
GLOB   --    --   6.0* AGRAT   --    --   0.4* CBC w/Diff Recent Labs  
   09/28/18 
 0547  09/27/18 0933 09/26/18 
 1947 WBC  8.5  8.4  12.7 RBC  4.12*  4.11*  4.23* HGB  13.7  13.6  13.9 HCT  40.9  40.2  40.9 PLT  147  173  237 GRANS  78*   --   85* LYMPH  10*   --   6*  
EOS  0   --   0 Cardiac Enzymes Recent Labs  
   09/27/18 0933 09/26/18 2035 CPK  46  42 CKND1  6.7*  7.1* Coagulation No results for input(s): PTP, INR, APTT in the last 72 hours. No lab exists for component: INREXT, INREXT Lipid Panel Lab Results Component Value Date/Time  Cholesterol, total 112 04/04/2011 02:17 AM  
 HDL Cholesterol <10 (L) 04/04/2011 02:17 AM  
 LDL, calculated NOT CALCULATED DUE TO LOW HDLC LEVEL 04/04/2011 02:17 AM  
 VLDL, calculated  04/04/2011 02:17 AM  
  Calculation not valid with this patient's other Lipid values. Triglyceride 327 (H) 04/04/2011 02:17 AM  
 CHOL/HDL Ratio NOT CALCULATED DUE TO LOW HDLC LEVEL 04/04/2011 02:17 AM  
  
BNP No results for input(s): BNPP in the last 72 hours. Liver Enzymes Recent Labs  
   09/26/18 
 2035  
TP  8.6* ALB  2.6* AP  103 SGOT  15 Thyroid Studies Lab Results Component Value Date/Time TSH 0.69 09/28/2018 05:47 AM  
    
Procedures/imaging: see electronic medical records for all procedures/Xrays and details which were not copied into this note but were reviewed prior to creation of Plan.

## 2018-09-28 NOTE — CONSULTS
Infectious Disease Consultation Note    Requested by: dr. Frances Smith    Reason: gram positive bacteremia, pneumonia    Current abx Prior abx   Ceftriaxone, azithromycin since 9/27 Levofloxacin 9/26-9/27     Lines:       Assessment :  68 y.o. male with no known significant past medical history presented to ed on 9/26/18 with generalized weakness, inability to get out of his chair. CT chest: Bronchitis, bronchiolitis with severe cystic bronchiectasis worse in the bilateral lower lobes with bilateral lung base pneumonia and bilateral lower lobe cysts with air-fluid levels. Clinical picture consistent with bilateral pneumonia superimposed on underlying bronchiectasis, chronic lung disease. Exact microbial etiology of infection unclear; ?viral/atypical bacterial/aspiration pneumonia    Swallow evaluation results indicated risk for aspiration. Hence, will modify abx to cover for this. Single positive blood culture for staph species 9/26 likely contaminant. Will f/u ID and repeat blood cultures to determine this. Recommendations:    1. D/c ceftriaxone, start amp/sulbactam  2. Continue azithromycin till 9/30  3. Pulmonary evaluation for severe bronchiectasis, outpatient follow up  4. Repeat blood cultures, f/u Id of gpc in blood cultures   5. Monitor clinically    Advance Care planning: DNR: discussed  with patient/surrogate decision maker:Josseline Del Valle: [phone]    Thank you for consultation request. Above plan was discussed in details with patient, and dr Art Myles. Please call me if any further questions or concerns. Will continue to participate in the care of this patient. HPI:    68 y.o. male with no known significant past medical history presented to ed on 9/26/18 with generalized weakness, inability to get out of his chair. He had been living with his mother, who was very recently placed in [address] [place] [address]. In the ed, he stated that he had been coughing and wheezing.   Reportedly family checked on him to find the patient siting in excrement in chair and called medics. In the ER the patient was hypoxic 82% pulse ox on room air and tachycardic and has required O2 4l nc now titrated to 2l for pulse ox 93%. Chest film suggested bilateral infiltrates and patient received Levaquin in ER and a CT chest that confirmed bronchitis and CAP. His abx were switched to ceftriaxone, azithromycin. His blood cultures from admission reveal staph species. I have been consulted for further recommendations. Patient states that he has had lung problems since childhood. Last saw his pcp about 6 months ago when he was treated for pneumonia. Has not been feeling good for 2 weeks - started out as runny nose. Denies chest pain, increased cough, chills, fever. Patient denies headaches, visual disturbances, sore throat,earaches,  abdominal pain, diarrhea, burning micturition, pain or weakness in extremities. He denies back pain/flank pain. He denies recent sick contacts. No h/o recent travel. No known h/o MRSA colonization or infection in the past.        History reviewed. No pertinent past medical history. History reviewed. No pertinent surgical history. There are no discharge medications for this patient.       Current Facility-Administered Medications   Medication Dose Route Frequency    potassium, sodium phosphates (NEUTRA-PHOS) packet 1 Packet  1 Packet Oral BID    influenza vaccine 2018-19 (6 mos+)(PF) (FLUARIX QUAD/FLULAVAL QUAD) injection 0.5 mL  0.5 mL IntraMUSCular PRIOR TO DISCHARGE    acetaminophen (TYLENOL) tablet 650 mg  650 mg Oral Q4H PRN    ondansetron (ZOFRAN ODT) tablet 4 mg  4 mg Oral Q6H PRN    heparin (porcine) injection 5,000 Units  5,000 Units SubCUTAneous Q8H    famotidine (PEPCID) tablet 20 mg  20 mg Oral BID    cefTRIAXone (ROCEPHIN) 2 g in sterile water (preservative free) 20 mL IV syringe  2 g IntraVENous Q24H    azithromycin (ZITHROMAX) tablet 500 mg  500 mg Oral DAILY    therapeutic multivitamin [facility]) tablet 1 Tab  1 Tab Oral DAILY    guaiFENesin ER (MUCINEX) tablet 600 mg  600 mg Oral Q12H       Allergies: Cat dander and Pollen extracts    History reviewed. No pertinent family history. Social History     Social History    Marital status: SINGLE     Spouse name: N/A    Number of children: N/A    Years of education: N/A     Occupational History    Not on file. Social History Main Topics    Smoking status: Not on file    Smokeless tobacco: Not on file    Alcohol use Not on file    Drug use: Not on file    Sexual activity: Not on file     Other Topics Concern    Not on file     Social History Narrative    No narrative on file     History   Smoking Status    Not on file   Smokeless Tobacco    Not on file        Temp (24hrs), Av.9 °F (36.1 °C), Min:96.7 °F (35.9 °C), Max:97.3 °F (36.3 °C)    Visit Vitals    /60 (BP 1 Location: Right arm, BP Patient Position: At rest)    Pulse 95    Temp 97 °F (36.1 °C)    Resp 18    Ht 5' 11\" (1.803 m)    Wt 46.3 kg (102 lb)    SpO2 97%    BMI 14.23 kg/m2       ROS: 12 point ROS obtained in details. Pertinent positives as mentioned in HPI,   otherwise negative    Physical Exam:      Constitutional: He is oriented to person, place, and time. He appears well-nourished. Frail, elderly     HENT:   Head: Normocephalic and atraumatic. Eyes: EOM are normal. Pupils are equal, round, and reactive to light. Neck: Normal range of motion. Neck supple. Cardiovascular: Normal rate, regular rhythm and normal heart sounds. Exam reveals no friction rub. No murmur heard. Pulmonary/Chest: Effort normal. No respiratory distress. Bilateral LL rales, occasional wheezes  Abdominal: Soft. He exhibits no distension. There is no tenderness. There is no rebound and no guarding. Musculoskeletal: Normal range of motion. Neurological: He is alert and oriented to person, place, and time. Skin: Skin is warm and dry.    Psychiatric: He has a normal mood and affect.  His behavior is normal. Thought content normal.        Labs: Results:   Chemistry Recent Labs      09/28/18   0547  09/27/18   0933  09/26/18 2035   GLU  83  107*  90   NA  134*  136  136   K  4.5  4.4  4.7   CL  100  100  98*   CO2  23  25  26   BUN  19*  19*  26*   CREA  0.69  0.71  0.98   CA  7.8*  8.0*  8.1*   AGAP  11  11  12   BUCR  28*  27*  27*   AP   --    --   103   TP   --    --   8.6*   ALB   --    --   2.6*   GLOB   --    --   6.0*   AGRAT   --    --   0.4*      CBC w/Diff Recent Labs      09/28/18   0547  09/27/18   0933  09/26/18   1947   WBC  8.5  8.4  12.7   RBC  4.12*  4.11*  4.23*   HGB  13.7  13.6  13.9   HCT  40.9  40.2  40.9   PLT  147  173  237   GRANS  78*   --   85*   LYMPH  10*   --   6*   EOS  0   --   0      Microbiology Recent Labs      09/26/18   2201  09/26/18   2145   CULT  NO GROWTH 2 DAYS  AEROBIC BOTTLE STAPHYLOCOCCUS SPECIES*          RADIOLOGY:    All available imaging studies/reports in [facility] for this admission were reviewed    Dr. Chelle Miranda, Infectious Disease Specialist  [phone]  September 28, 2018  1:31 PM

## 2018-09-28 NOTE — PROGRESS NOTES
MBS completed with recs of reg solid diet and nectar-thick liquids, meds as tolerated. Pt will require ST services upon DC from this facility. Full report to follow. Thank you for this referral. 
 
Maria Isabel Aldridge M.S. CCC-SLP/L Speech-Language Pathologist

## 2018-09-28 NOTE — PROGRESS NOTES
Problem: Dysphagia (Adult) Goal: *Acute Goals and Plan of Care (Insert Text) Patient will: 1. Tolerate PO trials with 0 s/s overt distress in 4/5 trials 2. Utilize compensatory swallow strategies/maneuvers (decrease bite/sip, size/rate, alt. liq/sol) with min cues in 4/5 trials 3. Perform oral-motor/laryngeal exercises to increase oropharyngeal swallow function with min cues 4. Complete an objective swallow study (i.e., MBSS) to assess swallow integrity, r/o aspiration, and determine of safest LRD, min A Rec:    
Reg solid with thin liquids Aspiration precautions HOB >45 during po intake, remain >30 for 30-45 minutes after po Small bites/sips; alternate liquid/solid with slow feeding rate Oral care TID Meds per pt preference MBS to further assess oropharyngeal swallow fxn next business date Speech LAnguage Pathology bedside swallow evaluation/treatment Patient: Gwendolyn Andujar (89 y.o. male) Date: 9/27/2018 Primary Diagnosis: Acute bronchitis with COPD ([facility] [address] SOB (shortness of breath) Hypoxia Precautions: aspiration ASSESSMENT : 
Based on the objective data described below, the patient presents with mild pharyngeal dysphagia c/b weak laryngeal elevation to palpation with all PO. Pt tolerated reg solid, puree, and thin liquids +/- straw without any overt s/sx of aspiration and positive oral clearance. Pt and family reported coughing with PO intake intermittently as well as difficulty tolerating secretions while sleeping. Rec reg solid diet with thin liquids, aspiration precautions, oral care TID, and meds as tolerated. Rec MBS next business date to further assess oropharyngeal swallow fxn and r/o silent aspiration. D/w pt and family who are in agreement with POC. ST will continue to follow. Patient will benefit from skilled intervention to address the above impairments. Patients rehabilitation potential is considered to be Good Factors which may influence rehabilitation potential include:  
[x]            None noted PLAN : 
Recommendations and Planned Interventions: See above Frequency/Duration: Patient will be followed by speech-language pathology 1-2 times per day/3-5 days per week to address goals. Discharge Recommendations: To Be Determined SUBJECTIVE:  
Patient stated Dennys Preston they take me downstairs for this test, or do I have to come down myself? . OBJECTIVE:  
History reviewed. No pertinent past medical history. History reviewed. No pertinent surgical history. Prior Level of Function/Home Situation: see below Home Situation Home Environment: Independent living One/Two Story Residence: One story Living Alone: Yes Support Systems: Child(larry) Patient Expects to be Discharged to[de-identified] Private residence Current DME Used/Available at Home: Walker, rolling Diet prior to admission: reg solid with thin Current Diet:  Reg solid with thin  
Cognitive and Communication Status: 
Neurologic State: Alert Orientation Level: Oriented X4 Cognition: Follows commands Oral Assessment: 
Oral Assessment Labial: No impairment Dentition: Natural;Intact Oral Hygiene: adequate Lingual: No impairment Velum: No impairment Mandible: No impairment P.O. Trials: 
Patient Position: [facility] at [facility] Vocal quality prior to P.O.: No impairment Consistency Presented: Thin liquid; Solid;Puree How Presented: Self-fed/presented;Cup/sip;Spoon;Straw Bolus Acceptance: No impairment Bolus Formation/Control: No impairment Propulsion: No impairment Oral Residue: None Initiation of Swallow: No impairment Laryngeal Elevation: Weak Aspiration Signs/Symptoms: None Pharyngeal Phase Characteristics: Feeling of discomfort Effective Modifications: Small sips and bites; Alternate liquids/solids Cues for Modifications: Minimal-moderate Oral Phase Severity: No impairment Pharyngeal Phase Severity : Mild Treatment Performed Following Evaluation: Training and education provided related to anatomy/physiology of oropharyngeal swallow, diet recs, safe swallowing strategies, compensatory strategy use, and positioning. Pt able to verbalize understanding; however, suspect limited carryover. Family verbalized understanding and agreement with POC. Will continue to follow. GCODESwallowing:  Swallow Current Status CI= 1-19%  Swallow Goal Status CH= 0% The severity rating is based on the following outcomes:   
Clinical Judgment Pain: 
Pt c/o 0/10 pain prior to evaluation. Pt c/o 0/10 pain post evaluation. After treatment:  
[]            Patient left in no apparent distress sitting up in chair 
[x]            Patient left in no apparent distress in bed 
[x]            Call bell left within reach [x]            Nursing notified 
[]            Caregiver present 
[]            Bed alarm activated COMMUNICATION/EDUCATION:  
[x]            SLP educated pt with regard to compensatory swallow strategies and 
     aspiration/reflux precautions including: small bites/sips, 
     alternate liquids/solids, decrease feeding rate, HOB > 45 with all po, and  
     upright in bed at 30 degrees after po for at least 45 minutes. [x]            Patient/family have participated as able in goal setting and plan of care. Thank you for this referral. 
 
Melissa Washington M.S. CCC-SLP/L Speech-Language Pathologist

## 2018-09-28 NOTE — PROGRESS NOTES
conducted an initial consultation and Spiritual Assessment for Adama Sampson, who is a 68 y. o.,male. Patients Primary Language is: [place]. According to the patients EMR Yarsanism Affiliation is: Other. The reason the Patient came to the hospital is:  
Patient Active Problem List  
 Diagnosis Date Noted  Pain of left hip joint 09/27/2018  Fall at home 09/27/2018  Community acquired pneumonia 09/27/2018  SOB (shortness of breath) 09/26/2018  Hypoxia 09/26/2018  Acute bronchitis with COPD ([facility] [address] 09/26/2018 The  provided the following Interventions: 
Initiated a relationship of care and support. Explored issues of cain, belief, spirituality and Mandaen/ritual needs while hospitalized. Listened empathically. Provided chaplaincy education. Provided information about Spiritual Care Services. Offered prayer and assurance of continued prayers on patient's behalf. Chart reviewed. The following outcomes where achieved: 
Patient shared limited information about both their medical narrative and spiritual journey/beliefs.  confirmed Patient's Yarsanism Affiliation. Patient processed feeling about current hospitalization. Patient expressed gratitude for 's visit. Assessment: 
Patient does not have any Mandaen/cultural needs that will affect patients preferences in health care. There are no spiritual or Mandaen issues which require intervention at this time. Plan: 
Chaplains will continue to follow and will provide pastoral care on an as needed/requested basis.  recommends bedside caregivers page  on duty if patient shows signs of acute spiritual or emotional distress. Chaplain Resident Dwight Lentz Spiritual [facility]  
[phone]

## 2018-09-28 NOTE — ROUTINE PROCESS
Bedside and Verbal shift change report given to Dane Louise (oncoming nurse) by Michele Gutierrez (offgoing nurse). Report included the following information SBAR, Intake/Output, MAR and Recent Results.

## 2018-09-28 NOTE — ROUTINE PROCESS
Bedside and Verbal shift change report given to Sultana Khanna (oncoming nurse) by Morales Lucero (offgoing nurse). Report included the following information Kardex, Intake/Output and MAR.

## 2018-09-28 NOTE — PROGRESS NOTES
Problem: Dysphagia (Adult) Goal: *Acute Goals and Plan of Care (Insert Text) Patient will: 1. Tolerate PO trials with 0 s/s overt distress in 4/5 trials 2. Utilize compensatory swallow strategies/maneuvers (decrease bite/sip, size/rate, alt. liq/sol) with min cues in 4/5 trials 3. Perform oral-motor/laryngeal exercises to increase oropharyngeal swallow function with min cues 4. Complete an objective swallow study (i.e., MBSS) to assess swallow integrity, r/o aspiration, and determine of safest LRD, min A - met 9/28/18 Rec:    
Reg solid with nectar-thick liquids Aspiration precautions HOB >45 during po intake, remain >30 for 30-45 minutes after po Small bites/sips; alternate liquid/solid with slow feeding rate Oral care TID Meds per pt preference SLP services when DC from this facility Outcome: Progressing Towards Goal 
Speech Pathology Modified barium swallow Study/treatment Patient: Prosper Wesley (1 y.o. male) Date: 9/28/2018 Primary Diagnosis: Acute bronchitis with COPD ([facility] [address] SOB (shortness of breath) Hypoxia Precautions: aspiration ASSESSMENT : 
Based on the objective data described below, the patient presents with mod pharyngeal dysphagia c/b silent penetration to laryngeal vestibule with thin liquid trials. Small sips and effortful swallow ineffective at improving airway protection. Pt tolerated reg solid, puree, nectar-thick +/- straw, and 13 mm Ba pill with nectar-thick wash without aspiration/penetration events. Mild-mod vallecular and pyriform residuals remained post swallow with all PO, cleared with second volitional swallow. Deficits include decreased laryngeal elevation and impaired pharyngeal motility/sensation. Rec reg solid diet with nectar-thick liquids, aspiration precautions, oral care TID, and meds as tolerated. Pt will benefit from SLP services when DC from this facility. ST will continue to follow. Patient will benefit from skilled intervention to address the above impairments. Patients rehabilitation potential is considered to be Good Factors which may influence rehabilitation potential include:  
[x]              None noted PLAN : 
Recommendations and Planned Interventions: See above Frequency/Duration: Patient will be followed by speech-language pathology 1-2 times per day/3-5 days per week to address goals. Discharge Recommendations: [address] and To Be Determined SUBJECTIVE:  
Patient stated I didn't want to tell you before, but I do like to drink beer. OBJECTIVE:  
History reviewed. No pertinent past medical history. History reviewed. No pertinent surgical history. Prior Level of Function/Home Situation: see below Home Situation Home Environment: Independent living One/Two Story Residence: One story Living Alone: Yes Support Systems: Child(larry) Patient Expects to be Discharged to[de-identified] Private residence Current DME Used/Available at Home: Walker, rolling Diet prior to admission: reg solid with thin Current Diet:  Reg solid with nectar-thick Radiologist:   
Film Views: Lateral;Fluoro Patient Position: 90 in fluoro chair Trial 1: Trial 2:  
Consistency Presented: Thin liquid Consistency Presented: Nectar thick liquid;Puree; Solid;Pill/Tablet How Presented: Self-fed/presented;Cup/sip;Straw How Presented: Self-fed/presented;Cup/sip;Spoon;Straw Bolus Acceptance: No impairment Bolus Acceptance: No impairment Bolus Formation/Control: No impairment:   Bolus Formation/Control: No impairment:    
Propulsion: No impairment Propulsion: No impairment Oral Residue: None Oral Residue: None Initiation of Swallow: No impairment Initiation of Swallow: Triggered at base of tongue Timing: No impairment Timing: No impairment Penetration: During swallow; To laryngeal vestibule Penetration: None Aspiration/Timing: No evidence of aspiration Aspiration/Timing: No evidence of aspiration Pharyngeal Clearance: Vallecular residue;Pyriform residue ; Less than 10% Pharyngeal Clearance: Vallecular residue;Pyriform residue ; Less than 10% Attempted Modifications: Double swallow Attempted Modifications: Double swallow Effective Modifications: Double swallow Effective Modifications: Double swallow Cues for Modifications: Moderate Cues for Modifications: Moderate Decreased Tongue Base Retraction?: No 
Laryngeal Elevation: Minimal movement of larynx/epiglottis Aspiration/Penetration Score: 3 (Penetration/Visible residue-Contrast remains above the folds/cords, but is not cleared) Pharyngeal Symmetry: Not assessed Pharyngeal-Esophageal Segment: No impairment Pharyngeal Dysfunction: Decreased strength;Decreased elevation/closure Oral Phase Severity: No impairment Pharyngeal Phase Severity: Moderate Treatment Performed Following Evaluation: 
Training and education provided related to anatomy/physiology of oropharyngeal swallow, diet recs, significance of thickened liquids, compensatory strategy use (small sips, alternating bites/sips, double swallow per bite/sip) and positioning. Pt able to verbalize understanding; however, suspect limited carryover. Will continue to follow. GCODESwallowing:  Swallow Current Status CK= 40-59%  Swallow Goal Status CJ= 20-39% The severity rating is based on the following outcomes:   
8-point Penetration-Aspiration Scale: Score 3 Professional Judgment PAIN: 
Pt reports 0/10 pain or discomfort prior to MBS. Pt reports 0/10 pain or discomfort post MBS. COMMUNICATION/EDUCATION:  
[x]  Patient educated regarding MBS results and diet recommendations. [x]  Patient/family have participated as able in goal setting and plan of care. Thank you for this referral. 
 
Holly Hinson M.S. CCC-SLP/L Speech-Language Pathologist

## 2018-09-28 NOTE — PROGRESS NOTES
Chelsea Layton of [address] [place] [address] states they can accommodate pt and his mom who is currently at [facility] to be in the same room as requested by the family.

## 2018-09-29 LAB
BACTERIA SPEC CULT: ABNORMAL
GRAM STN SPEC: ABNORMAL
GRAM STN SPEC: ABNORMAL
SERVICE CMNT-IMP: ABNORMAL

## 2018-09-29 PROCEDURE — 74011250637 HC RX REV CODE- 250/637: Performed by: HOSPITALIST

## 2018-09-29 PROCEDURE — 65660000000 HC RM CCU STEPDOWN

## 2018-09-29 PROCEDURE — 74011250636 HC RX REV CODE- 250/636: Performed by: INTERNAL MEDICINE

## 2018-09-29 PROCEDURE — 77030037878 HC DRSG MEPILEX >48IN BORD MOLN -B

## 2018-09-29 PROCEDURE — 74011000258 HC RX REV CODE- 258: Performed by: INTERNAL MEDICINE

## 2018-09-29 PROCEDURE — 97165 OT EVAL LOW COMPLEX 30 MIN: CPT

## 2018-09-29 PROCEDURE — 77010033678 HC OXYGEN DAILY

## 2018-09-29 PROCEDURE — 74011250636 HC RX REV CODE- 250/636: Performed by: HOSPITALIST

## 2018-09-29 PROCEDURE — 97530 THERAPEUTIC ACTIVITIES: CPT

## 2018-09-29 PROCEDURE — 97162 PT EVAL MOD COMPLEX 30 MIN: CPT

## 2018-09-29 PROCEDURE — 97535 SELF CARE MNGMENT TRAINING: CPT

## 2018-09-29 RX ADMIN — THERA TABS 1 TABLET: TAB at 08:47

## 2018-09-29 RX ADMIN — AMPICILLIN SODIUM AND SULBACTAM SODIUM 3 G: 2; 1 INJECTION, POWDER, FOR SOLUTION INTRAMUSCULAR; INTRAVENOUS at 17:23

## 2018-09-29 RX ADMIN — FAMOTIDINE 20 MG: 20 TABLET ORAL at 08:47

## 2018-09-29 RX ADMIN — GUAIFENESIN 600 MG: 600 TABLET, EXTENDED RELEASE ORAL at 21:50

## 2018-09-29 RX ADMIN — ENOXAPARIN SODIUM 40 MG: 100 INJECTION SUBCUTANEOUS at 17:18

## 2018-09-29 RX ADMIN — AZITHROMYCIN 500 MG: 250 TABLET, FILM COATED ORAL at 08:47

## 2018-09-29 RX ADMIN — FAMOTIDINE 20 MG: 20 TABLET ORAL at 17:18

## 2018-09-29 RX ADMIN — POTASSIUM & SODIUM PHOSPHATES POWDER PACK 280-160-250 MG 1 PACKET: 280-160-250 PACK at 17:18

## 2018-09-29 RX ADMIN — AMPICILLIN SODIUM AND SULBACTAM SODIUM 3 G: 2; 1 INJECTION, POWDER, FOR SOLUTION INTRAMUSCULAR; INTRAVENOUS at 12:43

## 2018-09-29 RX ADMIN — POTASSIUM & SODIUM PHOSPHATES POWDER PACK 280-160-250 MG 1 PACKET: 280-160-250 PACK at 08:47

## 2018-09-29 RX ADMIN — GUAIFENESIN 600 MG: 600 TABLET, EXTENDED RELEASE ORAL at 08:47

## 2018-09-29 NOTE — PROGRESS NOTES
Problem: Mobility Impaired (Adult and Pediatric) Goal: *Acute Goals and Plan of Care (Insert Text) Physical Therapy Goals Initiated 9/29/2018 and to be accomplished within 7 day(s) 1. Patient will move from supine to sit and sit to supine , scoot up and down and roll side to side in bed with supervision/set-up. 2.  Patient will transfer from bed to chair and chair to bed with minimal assistance using the least restrictive device. 3.  Patient will perform sit to stand with minimal assistance/contact guard assist. 
4.  Patient will ambulate with minimal assistance for 5-10 feet with the least restrictive device. Outcome: Progressing Towards Goal 
physical Therapy EVALUATION Patient: Lexi Roberts (71 y.o. male) Date: 9/29/2018 Primary Diagnosis: Acute bronchitis with COPD ([facility] [address] SOB (shortness of breath) Hypoxia Precautions:   Fall, TTWB (TTWB R LE) ASSESSMENT : 
PT orders received and patient cleared by nursing to participate with therapy. Patient is a 68 y.o. male admitted to the hospital due to falls and inability to get out of his chair afterwards. He was found in his chair at home excrement and being hypoxic. Patient consents to PT evaluation and treatment. Pt seen with OT for increased functional activity and safety. Pt complained of L LE pain not R LE. Relooked at xray reports and MD notes. Pt's L femur xray no acute fracture or dislocation. CT pelvis shows acute displaced R iliac fx, subacute B sacral joint fractures, and chrnoic R inferior pubic ramus fx. Pt performed supine to sit CGA. Scooting EOB and lateral along bed min A. Sit to supine min/CGA. Rolling CGA for assisting for self care to be cleaned up. Pt refused multiple times for standing activities. Educated on importance of increasing mobility. Pt made different excuses to limit his mobility and to be OOB today.  PT and OT both educated on importance of OOB and increasing functional mobility. Pt ended therapy supine in bed with alarm donned and all needs met. Patient will benefit from skilled intervention to address the above impairments and increase functional independence. Patients rehabilitation potential is considered to be Fair Factors which may influence rehabilitation potential include:  
[]         None noted 
[x]         Mental ability/status []         Medical condition 
[x]         Home/family situation and support systems 
[x]         Safety awareness 
[]         Pain tolerance/management 
[]         Other: PLAN : 
Recommendations and Planned Interventions: 
[x]           Bed Mobility Training             [x]    Neuromuscular Re-Education 
[x]           Transfer Training                   []    Orthotic/Prosthetic Training 
[x]           Gait Training                          []    Modalities [x]           Therapeutic Exercises          []    Edema Management/Control 
[x]           Therapeutic Activities            [x]    Patient and Family Training/Education 
[]           Other (comment): Frequency/Duration: Patient will be followed by physical therapy 1-2 times per day to address goals. Discharge Recommendations: Joel Roberts Further Equipment Recommendations for Discharge: rolling walker SUBJECTIVE:  
Patient stated Can we do this tomorrow?  \"I can't stand up\" \"I just want to lay back down\" \"I just need to rest today\" \"I should rest for a few days to get better before getting up\" OBJECTIVE DATA SUMMARY:  
History reviewed. No pertinent past medical history. History reviewed. No pertinent surgical history. Barriers to Learning/Limitations: yes;  cognitive Compensate with: Visual Cues, Verbal Cues and Tactile Cues Prior Level of Function/Home Situation: Independent with mobility including gait using a rollator.  Pt usually lives with his mother (Mother is in a SNF at this time). Aide comes to clean the house. Per brother, pt does not shower. Home Situation Home Environment: Private residence # Steps to Enter: 0 One/Two Story Residence: One story Living Alone: Yes Support Systems:  (usually lives with mother but she is in a SNF) Patient Expects to be Discharged to[de-identified] Rehabilitation facility Current DME Used/Available at Home: Elfrieda Alvarado, rollator, [employer], straight Critical Behavior: 
Neurologic State: Alert;Drowsy Psychosocial 
Patient Behaviors: Calm Purposeful Interaction: Yes Pt Identified Daily Priority: Clinical issues (comment) [employer] Process: Attend basic human needs Caring Interventions: Reassure Reassure: Caring rounds Strength:   
Strength:  (B LE3- to 3/5) Tone & Sensation:  
Tone: Normal (B LE) Sensation: Intact (B LE) Range Of Motion: 
AROM: Generally decreased, functional (B LE) Functional Mobility: 
Bed Mobility: 
Rolling: Contact guard assistance Supine to Sit: Contact guard assistance Sit to Supine: Contact guard assistance;Minimum assistance Scooting: Minimum assistance Transfers: 
 Pt refused Balance:  
Sitting: Intact Therapeutic Exercises:  
Reviewed ankle pumps Pain: 
Pre: 10/10  L LE Post: 10/10 L LE Activity Tolerance:  
poor Please refer to the flowsheet for vital signs taken during this treatment. After treatment:  
[] Patient left in no apparent distress sitting up in chair 
[] Patient left sitting on EOB [x] Patient left in no apparent distress in bed 
[x] Patient declined to be OOB [x] Call bell left within reach [x] Nursing notified(Mae) 
[] Caregiver present [x] Bed alarm activated 
[x] Personal items in reach COMMUNICATION/EDUCATION:  
[x]         Fall prevention education was provided and the patient/caregiver indicated understanding. [x]         Patient/family have participated as able in goal setting and plan of care.  
[x]         Patient/family agree to work toward stated goals and plan of care. 
[]         Patient understands intent and goals of therapy, but is neutral about his/her participation. []         Patient is unable to participate in goal setting and plan of care. Thank you for this referral. 
Chloe Camarena, PT, DPT Time Calculation: 32 mins Mobility [id number] Current  CJ= 20-39%   Goal  CJ= 20-39%. The severity rating is based on the Level of Assistance required for Functional Mobility and ADLs.  
 
Eval Complexity: History: MEDIUM  Complexity : 1-2 comorbidities / personal factors will impact the outcome/ POC Exam:HIGH Complexity : 4+ Standardized tests and measures addressing body structure, function, activity limitation and / or participation in recreation  Presentation: MEDIUM Complexity : Evolving with changing characteristics  Clinical Decision Making:Medium Complexity   Overall Complexity:MEDIUM

## 2018-09-29 NOTE — PROGRESS NOTES
Problem: Self Care Deficits Care Plan (Adult) Goal: *Acute Goals and Plan of Care (Insert Text) Occupational Therapy Goals Initiated 9/29/2018 within 7 day(s). 1.  Patient will perform grooming with supervision/set-up 2. Patient will perform upper body dressing with supervision/set-up. 3.  Patient will perform lower body dressing with moderate assistance with AE prn. 
4.  Patient will perform BSC transfers with maximal assistance. 5.  Patient will perform all aspects of toileting with moderate assistance . Outcome: Progressing Towards Goal 
Occupational Therapy EVALUATION Patient: Kylee Goodwin (83 y.o. male) Date: 9/29/2018 Primary Diagnosis: Acute bronchitis with COPD ([facility] [address] SOB (shortness of breath) Hypoxia Precautions:   Fall, Aspiration, Skin, TTWB RLE 
 
ASSESSMENT : 
Based on the objective data described below, the patient needed min encouragement to participate in OT. Patient educated on TTWB for RLE; patient needed min cues to implement during mobility. Patient needed CGA/min A for bed mobility with fair sitting balance on EOB. Patient refused any standing/OOB at this time, despite max encouragement and education on importance. Patient assisted back to supine with min A. Patient noted to be incontinent of stool. Patient needed CGA/min A to roll, with min cues for hand placement, and max A to perform kim care/toileting tasks, with additional time. Patient needed min A to doff/geovani new gown in supine. Wound noted on patients' sacrum during toileting, nurse aware and patient turned to offset pressure. Patient will benefit from skilled intervention to address the above impairments. Patients rehabilitation potential is considered to be Good Factors which may influence rehabilitation potential include:  
[]             None noted []             Mental ability/status [x]             Medical condition []             Home/family situation and support systems []             Safety awareness []             Pain tolerance/management 
[]             Other: PLAN : 
Recommendations and Planned Interventions: 
[x]               Self Care Training                  [x]        Therapeutic Activities [x]               Functional Mobility Training    []        Cognitive Retraining 
[x]               Therapeutic Exercises           [x]        Endurance Activities [x]               Balance Training                   []        Neuromuscular Re-Education []               Visual/Perceptual Training     [x]   Home Safety Training 
[x]               Patient Education                 []        Family Training/Education []               Other (comment): Frequency/Duration: Patient will be followed by occupational therapy 1-2 times per day/4-7 days per week to address goals. Discharge Recommendations: Joel Roberts Further Equipment Recommendations for Discharge: TBD Barriers to Learning/Limitations: yes;  physical 
Compensate with: visual, verbal, tactile, kinesthetic cues/model PATIENT COMPLEXITY Eval Complexity: History: LOW Complexity : Brief history review ; Examination: LOW Complexity : 1-3 performance deficits relating to physical, cognitive , or psychosocial skils that result in activity limitations and / or participation restrictions ; Decision Making:LOW Complexity : No comorbidities that affect functional and no verbal or physical assistance needed to complete eval tasks  Assessment: Low Complexity G-CODES:  
 
Self Care  Current  CL= 60-79%  Goal  CJ= 20-39%. The severity rating is based on the Level of Assistance required for Functional Mobility and ADLs. SUBJECTIVE:  
Patient stated I need a few more days to rest first. OBJECTIVE DATA SUMMARY:  
History reviewed. No pertinent past medical history. History reviewed. No pertinent surgical history.  
Prior Level of Function/Home Situation: Patient reported he was independent in basic self care tasks and functional mobility PTA, using rollator. Home Situation Home Environment: Private residence # Steps to Enter: 0 One/Two Story Residence: One story Living Alone: Yes Support Systems:  (usually lives with mother but she is in a SNF) Patient Expects to be Discharged to[de-identified] Rehabilitation facility Current DME Used/Available at Home: Theora Au, rollator, [employer] Tereso, straight Tub or Shower Type:  (pateints' family member reported patient does not bathe) [x]  Right hand dominant   []  Left hand dominant Cognitive/Behavioral Status: 
Neurologic State: Alert Orientation Level: Oriented X4 Cognition: Follows commands Safety/Judgement: Fall prevention Skin: No skin changes noted Edema: No edema noted Vision/Perceptual:   
   Acuity: Within Defined Limits Coordination: 
Coordination: Within functional limits (BUEs) Balance: 
Sitting: Intact Strength: 
Strength: Generally decreased, functional (BUEs) Tone & Sensation: 
Tone: Normal (BUEs) Sensation: Intact (BUEs) Range of Motion: 
AROM: Generally decreased, functional (BUEs) Functional Mobility and Transfers for ADLs: 
Bed Mobility: 
Rolling: Contact guard assistance Supine to Sit: Contact guard assistance;Minimum assistance Sit to Supine: Contact guard assistance;Minimum assistance Scooting: Minimum assistance Transfers: 
Sit to Stand:  (N/A patient declined) ADL Assessment:(clincial judgement) Feeding: Supervision;Setup Oral Facial Hygiene/Grooming: Minimum assistance Bathing: Maximum assistance Upper Body Dressing: Minimum assistance Lower Body Dressing: Maximum assistance Toileting: Maximum assistance Pain: 
Pt reports 7/10 pain or discomfort prior to treatment.  ( right hip) 10/10-L leg Pt reports 7/10 pain or discomfort post treatment. Activity Tolerance:  
Fair Please refer to the flowsheet for vital signs taken during this treatment. After treatment: [] Patient left in no apparent distress sitting up in chair 
[x] Patient left in no apparent distress in bed 
[x] Call bell left within reach [x] Nursing notified 
[] Caregiver present 
[] Bed alarm activated COMMUNICATION/EDUCATION:  
[] Home safety education was provided and the patient/caregiver indicated understanding. [x] Patient/family have participated as able in goal setting and plan of care. [x] Patient/family agree to work toward stated goals and plan of care. [] Patient understands intent and goals of therapy, but is neutral about his/her participation. [] Patient is unable to participate in goal setting and plan of care. Thank you for this referral. 
Barbie Amaya, OTR/L Time Calculation: 28 mins

## 2018-09-29 NOTE — ROUTINE PROCESS
Bedside and Verbal shift change report given to Keith Sierra (oncoming nurse) by Ronna Leslie (offgoing nurse). Report included the following information Kardex, Intake/Output and MAR.

## 2018-09-29 NOTE — PROGRESS NOTES
Hospitalist Progress Note Patient: Adam Carver MRN: [id number]  CSN: [id number] YOB: 1945  Age: 68 y.o. Sex: male DOA: 9/26/2018 LOS:  LOS: 3 days Changed to unasyn and azithro per ID. Patient states he feels better. Beginning to cough and expectorate. Appetite improving. He denies constipation. Denies pain anywhere. Assessment/Plan 1. Hypoxia 82% on RA in ED. 
2. B.l pna superimposed on underlying bronchiectasis, chronic lung disease. unasyn per ID. 3. Severe sepsis poa (tachycardia, hypoxia) with evidence of end organ damage (lactic acidosis, elevated trop) - source m/l lungs 4. Lactic acidosis - resolved. 5. Elevated trop - flat, not c/w ACS. Echo noted. 6. Bronchitis, bronchiolitis with severe cystic bronchiectasis worse in the bilateral lower lobes with bilateral lung base pneumonia and bilateral lower 
lobe cysts with air-fluid levels. pulm consult Monday. 7. Falls at home, ambulatory dysfunction. 8. pelvic ring injury - non operative treatment per ortho. PT: Toe Touch Weight bearing RLE per ortho. lovenox x 4 weeks for dvt prophylaxis 9. Echo 9/28/19 EF 60%, LV moderate concentric hypertrophy, no RWMA, grade 1 DD. Mild TR. Mild - mod pulmonary hypertension. Mild AR. 10. 2/4 blood culture + staph hemolyticus likely contaminant - repeat blood cx (9/28) ngtd - ID input appreciated 11. Difficulty caring for himself at home. Has resided w/ his Mom for 67 years, mom recently went to [facility] rehab, family interested in them having shared room (per CM [facility] can accomodate). 12. mod pharyngeal dysphagia c/b silent penetration to laryngeal vestibule with thin liquid - s/p MBS - SLP recs noted. will require ST services upon DC from this facility. 13. Underweight Body mass index is 14.69 kg/(m^2). Nutritionist consult. multivit 14. Hypophosphatemia replete as needed. 15. DNR. No durable DNR on file. brother Manuel Im [phone] Additional Notes:   
 
Case discussed with:  [x]Patient  []Family  [x]Nursing  []Case Management DVT Prophylaxis:  []Lovenox  [x]Hep SQ  []SCDs  []Coumadin   []On Heparin gtt Vital signs/Intake and Output: 
Visit Vitals  /71 (BP 1 Location: Left arm, BP Patient Position: At rest)  Pulse 88  Temp 97.8 °F (36.6 °C)  Resp 16  
 Ht 5' 11\" (1.803 m)  Wt 47.8 kg (105 lb 4.8 oz)  SpO2 98%  BMI 14.69 kg/m2 Current Shift:    
Last three shifts:  09/27 1901 - 09/29 0700 In: 120 [P.O.:120] Out: 525 [Urine:525] Thin, generally weak, awake and alert. Ncat. Perrl. Poor dentition, poor oral hygiene RRR Rhonchi to both bases, symmetrical chest expansion. No dullness to percussion 
abd soft nt nd nabs No edema. dp 2+ b.l No focal deficit. Generalized weakness No rash Medications Reviewed Labs: Results:  
   
Chemistry Recent Labs  
   09/28/18 
 8254  09/27/18 2267 09/26/18 2035 GLU  83  107*  90 NA  134*  136  136  
K  4.5  4.4  4.7 CL  100  100  98* CO2  23  25  26 BUN  19*  19*  26* CREA  0.69  0.71  0.98  
CA  7.8*  8.0*  8.1* AGAP  11  11  12 BUCR  28*  27*  27* AP   --    --   103 TP   --    --   8.6* ALB   --    --   2.6*  
GLOB   --    --   6.0* AGRAT   --    --   0.4* CBC w/Diff Recent Labs  
   09/28/18 
 0547  09/27/18 0933 09/26/18 1947 WBC  8.5  8.4  12.7 RBC  4.12*  4.11*  4.23* HGB  13.7  13.6  13.9 HCT  40.9  40.2  40.9 PLT  147  173  237 GRANS  78*   --   85* LYMPH  10*   --   6*  
EOS  0   --   0 Cardiac Enzymes Recent Labs  
   09/27/18 0933 09/26/18 2035 CPK  46  42 CKND1  6.7*  7.1* Coagulation No results for input(s): PTP, INR, APTT in the last 72 hours. No lab exists for component: INREXT, INREXT Lipid Panel Lab Results Component Value Date/Time  Cholesterol, total 112 04/04/2011 02:17 AM  
 HDL Cholesterol <10 (L) 04/04/2011 02:17 AM  
 LDL, calculated NOT CALCULATED DUE TO LOW HDLC LEVEL 04/04/2011 02:17 AM  
 VLDL, calculated  04/04/2011 02:17 AM  
  Calculation not valid with this patient's other Lipid values. Triglyceride 327 (H) 04/04/2011 02:17 AM  
 CHOL/HDL Ratio NOT CALCULATED DUE TO LOW HDLC LEVEL 04/04/2011 02:17 AM  
  
BNP No results for input(s): BNPP in the last 72 hours. Liver Enzymes Recent Labs  
   09/26/18 2035  
TP  8.6* ALB  2.6* AP  103 SGOT  15 Thyroid Studies Lab Results Component Value Date/Time TSH 0.69 09/28/2018 05:47 AM  
    
Procedures/imaging: see electronic medical records for all procedures/Xrays and details which were not copied into this note but were reviewed prior to creation of Plan.

## 2018-09-30 LAB
ANION GAP SERPL CALC-SCNC: 9 MMOL/L (ref 3–18)
BASOPHILS # BLD: 0 K/UL (ref 0–0.1)
BASOPHILS NFR BLD: 0 % (ref 0–2)
BUN SERPL-MCNC: 12 MG/DL (ref 7–18)
BUN/CREAT SERPL: 20 (ref 12–20)
CALCIUM SERPL-MCNC: 7.2 MG/DL (ref 8.5–10.1)
CHLORIDE SERPL-SCNC: 104 MMOL/L (ref 100–108)
CO2 SERPL-SCNC: 24 MMOL/L (ref 21–32)
CREAT SERPL-MCNC: 0.59 MG/DL (ref 0.6–1.3)
DIFFERENTIAL METHOD BLD: ABNORMAL
EOSINOPHIL # BLD: 0.2 K/UL (ref 0–0.4)
EOSINOPHIL NFR BLD: 3 % (ref 0–5)
ERYTHROCYTE [DISTWIDTH] IN BLOOD BY AUTOMATED COUNT: 12.9 % (ref 11.6–14.5)
GLUCOSE SERPL-MCNC: 80 MG/DL (ref 74–99)
HCT VFR BLD AUTO: 38.1 % (ref 36–48)
HGB BLD-MCNC: 13 G/DL (ref 13–16)
LYMPHOCYTES # BLD: 0.5 K/UL (ref 0.9–3.6)
LYMPHOCYTES NFR BLD: 7 % (ref 21–52)
MAGNESIUM SERPL-MCNC: 1.7 MG/DL (ref 1.6–2.6)
MCH RBC QN AUTO: 32.7 PG (ref 24–34)
MCHC RBC AUTO-ENTMCNC: 34.1 G/DL (ref 31–37)
MCV RBC AUTO: 95.7 FL (ref 74–97)
MONOCYTES # BLD: 0.6 K/UL (ref 0.05–1.2)
MONOCYTES NFR BLD: 9 % (ref 3–10)
NEUTS SEG # BLD: 6 K/UL (ref 1.8–8)
NEUTS SEG NFR BLD: 81 % (ref 40–73)
PLATELET # BLD AUTO: 168 K/UL (ref 135–420)
PMV BLD AUTO: 11 FL (ref 9.2–11.8)
POTASSIUM SERPL-SCNC: 3.8 MMOL/L (ref 3.5–5.5)
RBC # BLD AUTO: 3.98 M/UL (ref 4.7–5.5)
SODIUM SERPL-SCNC: 137 MMOL/L (ref 136–145)
WBC # BLD AUTO: 7.4 K/UL (ref 4.6–13.2)

## 2018-09-30 PROCEDURE — 80048 BASIC METABOLIC PNL TOTAL CA: CPT | Performed by: HOSPITALIST

## 2018-09-30 PROCEDURE — 74011250636 HC RX REV CODE- 250/636: Performed by: HOSPITALIST

## 2018-09-30 PROCEDURE — 83735 ASSAY OF MAGNESIUM: CPT | Performed by: HOSPITALIST

## 2018-09-30 PROCEDURE — 36415 COLL VENOUS BLD VENIPUNCTURE: CPT | Performed by: HOSPITALIST

## 2018-09-30 PROCEDURE — 74011250637 HC RX REV CODE- 250/637: Performed by: HOSPITALIST

## 2018-09-30 PROCEDURE — 74011000258 HC RX REV CODE- 258: Performed by: INTERNAL MEDICINE

## 2018-09-30 PROCEDURE — 85025 COMPLETE CBC W/AUTO DIFF WBC: CPT | Performed by: HOSPITALIST

## 2018-09-30 PROCEDURE — 65660000000 HC RM CCU STEPDOWN

## 2018-09-30 PROCEDURE — 74011250636 HC RX REV CODE- 250/636: Performed by: INTERNAL MEDICINE

## 2018-09-30 PROCEDURE — 77030037875 HC DRSG MEPILEX <16IN BORD MOLN -A

## 2018-09-30 PROCEDURE — 77030018846 HC SOL IRR STRL H20 ICUM -A

## 2018-09-30 RX ORDER — ACETAMINOPHEN 500 MG
500 TABLET ORAL
Status: DISCONTINUED | OUTPATIENT
Start: 2018-09-30 | End: 2018-10-08 | Stop reason: HOSPADM

## 2018-09-30 RX ORDER — TALC
400 POWDER (GRAM) TOPICAL DAILY
Status: COMPLETED | OUTPATIENT
Start: 2018-09-30 | End: 2018-10-02

## 2018-09-30 RX ADMIN — AMPICILLIN SODIUM AND SULBACTAM SODIUM 3 G: 2; 1 INJECTION, POWDER, FOR SOLUTION INTRAMUSCULAR; INTRAVENOUS at 17:10

## 2018-09-30 RX ADMIN — GUAIFENESIN 600 MG: 600 TABLET, EXTENDED RELEASE ORAL at 22:33

## 2018-09-30 RX ADMIN — GUAIFENESIN 600 MG: 600 TABLET, EXTENDED RELEASE ORAL at 08:09

## 2018-09-30 RX ADMIN — AMPICILLIN SODIUM AND SULBACTAM SODIUM 3 G: 2; 1 INJECTION, POWDER, FOR SOLUTION INTRAMUSCULAR; INTRAVENOUS at 06:36

## 2018-09-30 RX ADMIN — AMPICILLIN SODIUM AND SULBACTAM SODIUM 3 G: 2; 1 INJECTION, POWDER, FOR SOLUTION INTRAMUSCULAR; INTRAVENOUS at 00:10

## 2018-09-30 RX ADMIN — AZITHROMYCIN 500 MG: 250 TABLET, FILM COATED ORAL at 08:10

## 2018-09-30 RX ADMIN — FAMOTIDINE 20 MG: 20 TABLET ORAL at 17:10

## 2018-09-30 RX ADMIN — Medication 375 MG: at 12:37

## 2018-09-30 RX ADMIN — AMPICILLIN SODIUM AND SULBACTAM SODIUM 3 G: 2; 1 INJECTION, POWDER, FOR SOLUTION INTRAMUSCULAR; INTRAVENOUS at 12:38

## 2018-09-30 RX ADMIN — POTASSIUM & SODIUM PHOSPHATES POWDER PACK 280-160-250 MG 1 PACKET: 280-160-250 PACK at 17:10

## 2018-09-30 RX ADMIN — FAMOTIDINE 20 MG: 20 TABLET ORAL at 08:10

## 2018-09-30 RX ADMIN — ENOXAPARIN SODIUM 40 MG: 100 INJECTION SUBCUTANEOUS at 17:10

## 2018-09-30 RX ADMIN — AMPICILLIN SODIUM AND SULBACTAM SODIUM 3 G: 2; 1 INJECTION, POWDER, FOR SOLUTION INTRAMUSCULAR; INTRAVENOUS at 23:25

## 2018-09-30 RX ADMIN — THERA TABS 1 TABLET: TAB at 08:10

## 2018-09-30 RX ADMIN — Medication 1 CAPSULE: at 08:50

## 2018-09-30 RX ADMIN — POTASSIUM & SODIUM PHOSPHATES POWDER PACK 280-160-250 MG 1 PACKET: 280-160-250 PACK at 08:10

## 2018-09-30 NOTE — PROGRESS NOTES
Hospitalist Progress Note Patient: Reji Mendez MRN: [id number]  CSN: [id number] YOB: 1945  Age: 68 y.o. Sex: male DOA: 9/26/2018 LOS:  LOS: 4 days Patient states he needs to be cleaned up. He reports breathing a little better, able to cough some but having difficulty with secretions. Brother / Reggy Doing provided with copy of durable DNR to review and discuss amongst family. Assessment/Plan 1. Hypoxia 82% on RA in ED. 
2. B.l pna superimposed on underlying bronchiectasis, chronic lung disease. unasyn per ID. 3. Severe sepsis poa (tachycardia, hypoxia) with evidence of end organ damage (lactic acidosis, elevated trop) - source m/l lungs 4. Lactic acidosis - resolved. 5. Elevated trop - flat, not c/w ACS. Echo noted. 6. Bronchitis, bronchiolitis with severe cystic bronchiectasis worse in the bilateral lower lobes with bilateral lung base pneumonia and bilateral lower 
lobe cysts with air-fluid levels. pulm consult Monday. 7. Falls at home, ambulatory dysfunction. 8. pelvic ring injury - non operative treatment per ortho. PT: Toe Touch Weight bearing RLE per ortho. lovenox x 4 weeks for dvt prophylaxis 9. Echo 9/28/19 EF 60%, LV moderate concentric hypertrophy, no RWMA, grade 1 DD. Mild TR. Mild - mod pulmonary hypertension. Mild AR. 10. 2/4 blood culture + staph hemolyticus likely contaminant - repeat blood cx (9/28) ngtd - ID input appreciated 11. Difficulty caring for himself at home. Has resided w/ his Mom for 67 years, mom recently went to [facility] rehab, family interested in them having shared room (per CM [facility] can accomodate). 12. mod pharyngeal dysphagia c/b silent penetration to laryngeal vestibule with thin liquid - s/p MBS - SLP recs noted. will require ST services upon DC from this facility. 13. Underweight Body mass index is 14.85 kg/(m^2). Nutritionist consult. multivit 14. Hypophosphatemia replete as needed. 15. DNR. No durable DNR on file - family to discuss. brother Zohra Garcia [phone] Additional Notes:   
 
Case discussed with:  [x]Patient  [x]Family  [x]Nursing  []Case Management DVT Prophylaxis:  []Lovenox  [x]Hep SQ  []SCDs  []Coumadin   []On Heparin gtt Vital signs/Intake and Output: 
Visit Vitals  /69 (BP 1 Location: Right arm, BP Patient Position: At rest)  Pulse 65  Temp 97.1 °F (36.2 °C)  Resp 16  
 Ht 5' 11\" (1.803 m)  Wt 48.3 kg (106 lb 8 oz)  SpO2 98%  BMI 14.85 kg/m2 Current Shift:    
Last three shifts:    
 
Thin, generally weak, awake and alert. Ncat. Perrl. Poor dentition, poor oral hygiene RRR Rhonchi to both bases, symmetrical chest expansion. No dullness to percussion 
abd soft nt nd nabs No edema. dp 2+ b.l No focal deficit. Generalized weakness No rash Medications Reviewed Labs: Results:  
   
Chemistry Recent Labs  
   09/30/18 
 2209  09/28/18 
 1046 GLU  80  83 NA  137  134* K  3.8  4.5  
CL  104  100 CO2  24  23 BUN  12  19* CREA  0.59*  0.69 CA  7.2*  7.8* AGAP  9  11 BUCR  20  28* CBC w/Diff Recent Labs  
   09/30/18 
 0737  09/28/18 
 0547 WBC  7.4  8.5  
RBC  3.98*  4.12* HGB  13.0  13.7 HCT  38.1  40.9 PLT  168  147 GRANS  81*  78* LYMPH  7*  10* EOS  3  0 Cardiac Enzymes No results for input(s): CPK, CKND1, SUMMER in the last 72 hours. No lab exists for component: Hejoselin Chick Coagulation No results for input(s): PTP, INR, APTT in the last 72 hours. No lab exists for component: INREXT, INREXT Lipid Panel Lab Results Component Value Date/Time Cholesterol, total 112 04/04/2011 02:17 AM  
 HDL Cholesterol <10 (L) 04/04/2011 02:17 AM  
 LDL, calculated NOT CALCULATED DUE TO LOW HDLC LEVEL 04/04/2011 02:17 AM  
 VLDL, calculated  04/04/2011 02:17 AM  
  Calculation not valid with this patient's other Lipid values.   
 Triglyceride 327 (H) 04/04/2011 02:17 AM  
 CHOL/HDL Ratio NOT CALCULATED DUE TO LOW HDLC LEVEL 04/04/2011 02:17 AM  
  
BNP No results for input(s): BNPP in the last 72 hours. Liver Enzymes No results for input(s): TP, ALB, TBIL, AP, SGOT, GPT in the last 72 hours. No lab exists for component: DBIL Thyroid Studies Lab Results Component Value Date/Time TSH 0.69 09/28/2018 05:47 AM  
    
Procedures/imaging: see electronic medical records for all procedures/Xrays and details which were not copied into this note but were reviewed prior to creation of Plan.

## 2018-09-30 NOTE — ROUTINE PROCESS
Bedside and Verbal shift change report given to Waldemar Aldridge RN (oncoming nurse) by Christine Sidhu RN 
 (offgoing nurse). Report included the following information SBAR, Kardex, Intake/Output and MAR.

## 2018-09-30 NOTE — ROUTINE PROCESS
Suctioned orally once . 19:00 report given to Shiv Navarro patient is awake  Alert no complaints at this time.

## 2018-10-01 PROBLEM — S32.810A CLOSED PELVIC RING FRACTURE (HCC): Status: ACTIVE | Noted: 2018-10-01

## 2018-10-01 PROCEDURE — 74011250636 HC RX REV CODE- 250/636: Performed by: INTERNAL MEDICINE

## 2018-10-01 PROCEDURE — 74011000258 HC RX REV CODE- 258: Performed by: INTERNAL MEDICINE

## 2018-10-01 PROCEDURE — 65660000000 HC RM CCU STEPDOWN

## 2018-10-01 PROCEDURE — 74011250637 HC RX REV CODE- 250/637: Performed by: INTERNAL MEDICINE

## 2018-10-01 PROCEDURE — 77010033678 HC OXYGEN DAILY

## 2018-10-01 PROCEDURE — 74011250637 HC RX REV CODE- 250/637: Performed by: HOSPITALIST

## 2018-10-01 PROCEDURE — 92526 ORAL FUNCTION THERAPY: CPT

## 2018-10-01 PROCEDURE — 74011250636 HC RX REV CODE- 250/636: Performed by: HOSPITALIST

## 2018-10-01 RX ORDER — CHOLESTYRAMINE 4 G/4.8G
4 POWDER, FOR SUSPENSION ORAL
Status: DISCONTINUED | OUTPATIENT
Start: 2018-10-01 | End: 2018-10-08 | Stop reason: HOSPADM

## 2018-10-01 RX ADMIN — THERA TABS 1 TABLET: TAB at 09:54

## 2018-10-01 RX ADMIN — AMPICILLIN SODIUM AND SULBACTAM SODIUM 3 G: 2; 1 INJECTION, POWDER, FOR SOLUTION INTRAMUSCULAR; INTRAVENOUS at 05:41

## 2018-10-01 RX ADMIN — FAMOTIDINE 20 MG: 20 TABLET ORAL at 18:16

## 2018-10-01 RX ADMIN — GUAIFENESIN 600 MG: 600 TABLET, EXTENDED RELEASE ORAL at 09:54

## 2018-10-01 RX ADMIN — AMPICILLIN SODIUM AND SULBACTAM SODIUM 3 G: 2; 1 INJECTION, POWDER, FOR SOLUTION INTRAMUSCULAR; INTRAVENOUS at 23:34

## 2018-10-01 RX ADMIN — FAMOTIDINE 20 MG: 20 TABLET ORAL at 09:55

## 2018-10-01 RX ADMIN — AMPICILLIN SODIUM AND SULBACTAM SODIUM 3 G: 2; 1 INJECTION, POWDER, FOR SOLUTION INTRAMUSCULAR; INTRAVENOUS at 12:10

## 2018-10-01 RX ADMIN — AMPICILLIN SODIUM AND SULBACTAM SODIUM 3 G: 2; 1 INJECTION, POWDER, FOR SOLUTION INTRAMUSCULAR; INTRAVENOUS at 18:15

## 2018-10-01 RX ADMIN — CHOLESTYRAMINE 4 G: 4 POWDER, FOR SUSPENSION ORAL at 18:16

## 2018-10-01 RX ADMIN — ENOXAPARIN SODIUM 40 MG: 100 INJECTION SUBCUTANEOUS at 18:16

## 2018-10-01 RX ADMIN — Medication 1 CAPSULE: at 09:54

## 2018-10-01 RX ADMIN — GUAIFENESIN 600 MG: 600 TABLET, EXTENDED RELEASE ORAL at 22:42

## 2018-10-01 RX ADMIN — Medication 375 MG: at 09:54

## 2018-10-01 NOTE — PROGRESS NOTES
Problem: Dysphagia (Adult) Goal: *Acute Goals and Plan of Care (Insert Text) Patient will: 1. Tolerate PO trials with 0 s/s overt distress in 4/5 trials 2. Utilize compensatory swallow strategies/maneuvers (decrease bite/sip, size/rate, alt. liq/sol) with min cues in 4/5 trials 3. Perform oral-motor/laryngeal exercises to increase oropharyngeal swallow function with min cues 4. Complete an objective swallow study (i.e., MBSS) to assess swallow integrity, r/o aspiration, and determine of safest LRD, min A - met 9/28/18 Rec:    
Reg solid with nectar-thick liquids Aspiration precautions HOB >45 during po intake, remain >30 for 30-45 minutes after po Small bites/sips; alternate liquid/solid with slow feeding rate Oral care TID Meds per pt preference SLP services when DC from this facility Outcome: Not Met Speech language pathology dysphagia treatment Patient: Yolie Patrick (25 y.o. male) Date: 10/1/2018 Diagnosis: Acute bronchitis with COPD ([facility] [address] SOB (shortness of breath) Hypoxia Community acquired pneumonia Precautions:  Fall, Aspiration, Skin ASSESSMENT: 
Pt seen b/s for dysphagia tx. Pt with thin water on b/s table. Pt unable to recall results of MBS, re-educated pt to laryngeal penetration with thin liquids placing pt at risk of aspiration and recurrent PNA, and pharyngeal residue which clears with second, dry swallow. Pt reported having PNA 2 times recently. Pt accepted and tolerated straw sips nectar and regular solids with mildly prolonged masticaiton (functional given increased time) without overt s/sx aspiration. Pt required mod verbal cues for one sip/ bite and 2 swallows per presentation. Pt tolerated pills one at a time with straw sips nectar without overt s/sx aspiration, distress or c/o of pills sticking in pharynx. Attempted [place] exercise, pt unable to follow multi-step commands to complete exercise.   Rec: continue regular solids with nectar thick liquids, 2 swallows per presentation, aspiration precautions. SLP will continue to follow. Progression toward goals: 
[]         Improving appropriately and progressing toward goals [x]         Improving slowly and progressing toward goals 
[]         Not making progress toward goals and plan of care will be adjusted PLAN: 
Recommendations and Planned Interventions: 
continue regular solids with nectar thick liquids, 2 swallows per presentation, aspiration precautions. Patient continues to benefit from skilled intervention to address the above impairments. Continue treatment per established plan of care. Discharge Recommendations:  Joel Roberts SUBJECTIVE:  
Patient stated Lamont Murphy, that thick stuff. Re: OJ on breakfast tray OBJECTIVE:  
Cognitive and Communication Status: 
Neurologic State: Alert Orientation Level: Oriented X4 Cognition: Follows commands Perception: Appears intact Perseveration: No perseveration noted Safety/Judgement: Fall prevention, Decreased insight into deficits Dysphagia Treatment: 
Oral Assessment: 
Oral Assessment Labial: No impairment Dentition: Natural, Limited Oral Hygiene: fair Lingual: No impairment Velum: No impairment Mandible: No impairment P.O. Trials: 
 Patient Position: HOB 60*` Vocal quality prior to P.O.: No impairment Consistency Presented: Mechanical soft, Nectar thick liquid, Pill/Tablet How Presented: Self-fed/presented, Straw Bolus Acceptance: No impairment Bolus Formation/Control: Impaired Type of Impairment: Mastication Propulsion: No impairment Oral Residue: None Initiation of Swallow: Delayed (# of seconds) Laryngeal Elevation: Weak Aspiration Signs/Symptoms: None Pharyngeal Phase Characteristics: Suspected pharyngeal residue (pharyngeal residue on MBS) Effective Modifications: Double swallow, Small sips and bites Cues for Modifications: Moderate Oral Phase Severity: Mild Pharyngeal Phase Severity : Mild PAIN: 
Start of Tx: 0 End of Tx: 0 After treatment:  
[]              Patient left in no apparent distress sitting up in chair 
[x]              Patient left in no apparent distress in bed 
[x]              Call bell left within reach [x]              Nursing notified 
[]              Family present 
[]              Caregiver present 
[]              Bed alarm activated COMMUNICATION/EDUCATION:  
[x] Aspiration precautions; swallow safety; compensatory techniques []        Patient unable to participate in education; education ongoing with staff [x]  Posted safety precautions in patient's room. [x] Oral-motor/laryngeal strengthening exercises Samira Smith MS, CCC/SLP Time Calculation: 20 mins

## 2018-10-01 NOTE — PROGRESS NOTES
Important Message from [id number]" reviewed and explained with the patient and/or representative at bedside and signature was obtained. A signed copy provided to patient/representative. Original signed document placed in patient's chart.

## 2018-10-01 NOTE — ADT AUTH CERT NOTES
Pneumonia, Community Acquired - Care Day 4 (9/29/2018) by Samuel Gomez RN     
  Review Status Review Entered    
  Completed 9/29/2018    
  Details    
      
  Care Day: 4 Care Date: 9/29/2018 Level of Care: Telemetry    
  Guideline Day 2     
  Level Of Care    
  (X) Floor    
      
  Clinical Status    
  (X) * No [id number] or acidosis    
  (X) * No requirement for mechanical ventilation    
  (X) * Hypotension absent    
  (X) * Fever absent or reduced    
  ( ) * No hypoxia on room air or oxygenation improved    
  (X) * Mental status improved or at baseline    
      
  Activity    
  ( ) * Increased activity    
      
  Routes    
  (X) Oral hydration, medications    
  (X) Usual diet    
      
  Interventions    
  (X) Possible oxygen    
      
  Medications    
  (X) IV or oral antibiotics    
  9/29/2018 6:54 PM EDT by Jeff Verde    
    UNASYN 3G IV X 2    
      
      
  9/29/2018 6:54 PM EDT by Jeff Verde    
  Subject: Additional Clinical Information    
  IM 9/29/18    
  Changed to unasyn and azithro per ID.    
  ã    
  Patient states he feels better. Beginning to cough and expectorate. Appetite improving. He denies constipation. Denies pain anywhere    
  Hypoxia 82% on RA in ED.    
  2. B.l pna superimposed on underlying bronchiectasis, chronic lung disease. unasyn per ID.    
  3. Severe sepsis poa (tachycardia, hypoxia) with evidence of end organ damage (lactic acidosis, elevated trop) - source m/l lungs    
  4. Lactic acidosis - resolved.    
  5. Elevated trop - flat, not c/w ACS. Echo noted.    
  6. Bronchitis, bronchiolitis with severe cystic bronchiectasis worse in the bilateral lower lobes with bilateral lung base pneumonia and bilateral lower    
  lobe cysts with air-fluid levels. pulm consult Monday.    
  7. Falls at home, ambulatory dysfunction.    
  8. pelvic ring injury - non operative treatment per ortho.  PT: Toe Touch Weight bearing RLE per ortho. lovenox x 4 weeks for dvt prophylaxis    
  9. Echo 9/28/19 EF 60%, LV moderate concentric hypertrophy, no RWMA, grade 1 DD. Mild TR. Mild - mod pulmonary hypertension. Mild AR.    
  10. 2/4ãblood culture + staph hemolyticus likely contaminant - repeat blood cx (9/28) ngtdã- ID input appreciated    
  11. Difficulty caring for himself at home. Has resided w/ his Mom for 67 years, [facility], family interested in them having shared room (per  [facility] can accomodate).    
  12. mod pharyngeal dysphagia c/b silent penetration to laryngeal vestibule with thin liquid - s/p MBS - SLP recs noted. will require ST services upon DC from this facility.    
  13. Underweight Body mass index is 14.69 kg/(m^2).    
  Nutritionist consult. multivit    
  14. Hypophosphatemia replete as needed.    
  15. DNR.  No durable DNR on file.    
      
  NO LABS/RESULTS    
      
  144/78    
  97.6F    
  93HR    
  18RR    
  99%2LNC    
      
      
      
      
      
  * Milestone    
      
   
  Pneumonia, Community Acquired - Care Day 3 (9/28/2018) by Marshall Abdul V     
  Review Status Review Entered    
  Completed 9/28/2018    
  Details    
      
  Care Day: 3 Care Date: 9/28/2018 Level of Care: Telemetry    
  Guideline Day 2     
  Level Of Care    
  (X) Floor    
  9/28/2018 2:43 PM EDT by Kait Medina    
    telemetry unit    
      
      
  Clinical Status    
  (X) * No [id number] or acidosis    
  9/28/2018 2:43 PM EDT by Kait Medina    
    none noted    
      
  (X) * No requirement for mechanical ventilation    
  9/28/2018 2:43 PM EDT by Kait Medina    
    None noted    
      
  (X) * Hypotension absent    
  9/28/2018 2:43 PM EDT by Kait Medina    
    115/60    
      
  (X) * Fever absent or reduced    
  9/28/2018 2:43 PM EDT by Kait Medina    
    T 97.0    
      
  ( ) * No hypoxia on room air or oxygenation improved    
   (X) * Mental status improved or at baseline    
  9/28/2018 2:43 PM EDT by Betsey Hernández    
    Neurological: He is alert and oriented to person, place, and time    
      
      
  Activity    
  ( ) * Increased activity    
      
  Routes    
  (X) Oral hydration, medications    
  9/28/2018 2:43 PM EDT by Betsey Hernández    
    pepcid 20mg PO BIDx2, mucinex 600mg PO q 45tynh0, heparin 5000units q 8hrs x1, prednisone 60mg PO daily x1    
      
  (X) Usual diet    
  9/28/2018 2:43 PM EDT by Betsey Hernández    
    reg nectar thick    
      
      
  Interventions    
  (X) Possible oxygen    
  9/28/2018 2:43 PM EDT by Betsey Hernández    
    2 liters NC    
      
      
  Medications    
  (X) IV or oral antibiotics    
  9/28/2018 2:43 PM EDT by Betsey Hernández    
    rocephin 2g IV x1, zithromax 500mg IV x1    
      
      
  9/28/2018 2:43 PM EDT by Betsey Hernández    
  Subject: Additional Clinical Information    
  9/28/18Vitals- HR 95, RR 18, 97%2 L [id number] states he feels a little better than yesterday.    
  Plan-SCDs, Aspiration precautions, bedrest complete, fall precautions, speech therapy, oral care BID, SLP barium swallow, vital signs q 4 hrs, daily weight.    
      
      
      
      
      
      
  * Milestone    
      
  Additional Notes    
  9/28/18 IM note    
      
  Assessment/Plan    
       
   1. Hypoxia 82% on RA in ED.    
  2. CAP - ceftri, azithro - blood cx (9/26) ngtd. RT for bronchial hygiene protocol. Mucinex.     
  3. Severe sepsis poa (tachycardia, hypoxia) with evidence of end organ damage (lactic acidosis, elevated trop) - source m/l lungs     
  4. Lactic acidosis - resolved.     
  5. Elevated trop - flat, not c/w ACS. Echo noted.    
  6. Bronchitis, bronchiolitis with severe cystic bronchiectasis worse in the bilateral lower lobes with bilateral lung base pneumonia and bilateral lower    
  lobe cysts with air-fluid levels. ID consult.     
  7.  Falls at home    
   8. xr findings suspicious for pelvic fracture - CT pelvis     
  9. Echo 9/28/19 EF 60%, LV moderate concentric hypertrophy, no RWMA, grade 1 DD. Mild TR. Mild - mod pulmonary hypertension. Mild AR.     
  10. Ambulatory dysfunction - found at home sitting in excrement. Pt / ot when appropriate.     
  11. Difficulty caring for himself at home. Has resided w/ his Mom for 67 years, mom recently went to [facility] rehab, family interested in them having shared room (per CM [facility] can accomodate).    
  12. mod pharyngeal dysphagia c/b silent penetration to laryngeal vestibule with thin liquid - s/p MBS - SLP recs noted. will require ST services upon DC from this facility.    
  13. Underweight Body mass index is 14.23 kg/(m^2).    
  Nutritionist consult. multivit    
  14. Staph aureus bsi - repeat blood cx (9/28) pending - ID to consult.     
  15. Hypophosphatemia replete as needed.     
  16. DNR. No durable DNR on file.     
      
      
      
  9/28/18 ID Note-    
  Assessment :    
  68 y. o. male with no known significant past medical history presented to ed on 9/26/18 with generalized weakness, inability to get out of his chair.    
       
  CT chest: Bronchitis, bronchiolitis with severe cystic bronchiectasis worse in the bilateral lower lobes with bilateral lung base pneumonia and bilateral lower lobe cysts with air-fluid levels.    
       
  Clinical picture consistent with bilateral pneumonia superimposed on underlying bronchiectasis, chronic lung disease.     
  Exact microbial etiology of infection unclear; ?viral/atypical bacterial/aspiration pneumonia    
       
  Swallow evaluation results indicated risk for aspiration. Hence, will modify abx to cover for this.     
       
  Single positive blood culture for staph species 9/26 likely contaminant.  Will f/u ID and repeat blood cultures to determine this.     
       
  Recommendations:    
       
   1. D/c ceftriaxone, start amp/sulbactam    
  2. Continue azithromycin till 9/30    
  3. Pulmonary evaluation for severe bronchiectasis, outpatient follow up    
  4. Repeat blood cultures, f/u Id of gpc in blood cultures     
  5. Monitor clinically    
       
  Advance Care planning: DNR: discussed  with patient/surrogate decision maker:Josseline Del Valle: [phone]    
       
  Thank you for consultation request. Above plan was discussed in details with patient, and dr Renea Garner. Please call me if any further questions or concerns.  Will continue to participate in the care of this patient.    
   
  Pneumonia, Community Acquired - Care Day 2 (9/27/2018) by Katherine Wiseman V     
  Review Status Review Entered    
  Completed 9/28/2018    
  Details    
      
  Care Day: 2 Care Date: 9/27/2018 Level of Care: Telemetry    
  Guideline Day 2     
  Level Of Care    
  (X) Floor    
  9/28/2018 2:34 PM EDT by [facility]    
    telemetry bed    
      
      
  Clinical Status    
  (X) * No [id number] or acidosis    
  9/28/2018 2:34 PM EDT by [facility]    
    none noted    
      
  (X) * No requirement for mechanical ventilation    
  9/28/2018 2:34 PM EDT by [facility]    
    none noted    
      
  (X) * Hypotension absent    
  9/28/2018 2:34 PM EDT by Laural Fat    
    135/71    
      
  (X) * Fever absent or reduced    
  9/28/2018 2:34 PM EDT by [facility]    
    T 97.0    
      
  ( ) * No hypoxia on room air or oxygenation improved    
  (X) * Mental status improved or at baseline    
  9/28/2018 2:34 PM EDT by [facility]    
    A&Ox4 per RN assessment    
      
      
  Activity    
  ( ) * Increased activity    
      
  Routes    
  (X) Oral hydration, medications    
  9/28/2018 2:34 PM EDT [facility]    
    Tylenol 650mg PO q 4hrs prnx1, pepcid 20mg PO BIDx2, mucinex 600mg PO q 76lyvk4, heparin 5000units q 8hrs x3, prednisone 60mg PO daily x1    
      
  (X) Usual diet    
   9/28/2018 2:34 PM EDT by Marivel Pena    
    diet nectar thick reg    
      
      
  Interventions    
  (X) Possible oxygen    
  9/28/2018 2:34 PM EDT by Marivel Pena    
    98% 2 L NC    
      
      
  Medications    
  (X) IV or oral antibiotics    
  9/28/2018 2:34 PM EDT by Marivel Pena    
    zithromax 500mg PO daily , rocephin 2g IV q 24hrs x1    
      
      
  9/28/2018 2:34 PM EDT by Marivel Pena    
  Subject: Additional Clinical Information    
  9/27/18Vitals-HR 87,   RR 18, Patient denies dysphagia, denies eyes watering with po, denies wheezing with po. Brother at bedside states he's not sure, but patient cough when he lays flat. Patient denies pain anywhere. Reports unintentional weight loss. Minimal non productive cough. Had BM yesterday.  /4 blood cx + GPC groups.  1. Hypoxia 82% on RA in ED. tx underlying infxn, 2. CAP - ceftri, azithro - blood cx (9/26) ngtd. RT for bronchial hygiene protocol. Mucinex. 3. Severe sepsis poa (tachycardia, hypoxia) with evidence of end organ damage (lactic acidosis, elevated trop) - source m/l lungs 4. Lactic acidosis - recheck5. Elevated trop - recheck . Check echo. 6. Bronchitis, bronchiolitis with severe cystic bronchiectasis worse in the bilateral lower lobes with bilateral lung base pneumonia and bilateral lowerlobe cysts with air-fluid levels. Consider pulm consult. 7. Falls at [facility]. xr findings suspicious for pelvic fracture - CT pelvis9. Orthopnea - check echo10. Ambulatory dysfunction - found at home sitting in excrement. Pt / ot when appropriate. 11. Difficulty caring for himself at home. Has resided w/ his Mom for 67 years, mom recently went to [facility] rehab, family interested in them having shared room. 12. uses his mom's walker sometimes and does not have any other DMEs13. Underweight Body mass index is 14.2 kg/(m^2). Nutritionist consult. Shanon Condon. 1/4 blood cx + GPC groups. Follow s/s and repeat blood cx 15. DNR. No durable DNR on file.  Plan-SCDs, Aspiration precautions, bedrest complete, fall precautions, speech therapy, oral care BID, SLP barium swallow, vital signs q 4 hrs, daily weight. Abnormal labs-BUN 19, Ca 8.0, CKMB 6.7, Trop 0933 0.09, trop 1915 0.06.    
      
      
      
      
  * Milestone    
      
  Additional Notes    
  9/27/18 Radiology     
  XR L femur-1.  No acute fracture or subluxation.    
  2.  Osteoarthrosis of the left hip.    
      
  XR Pelvis    
  1.  Questionable subtle curvilinear lucencies in the right superior and inferior    
  pubic rami.  Potentially suspicious for pelvic fracture.    
  2.  Moderate to severe osteoarthrosis in both hip joints.    
  3.  Questionable curvilinear lucency involving the left sacral ala.    
  4.  Recommend CT evaluation of the pelvis for further delineation

## 2018-10-01 NOTE — PROGRESS NOTES
Pt's brother Anton Mirza signed [place] [facility] for [facility]. Info has been uploaded to [facility] and [facility] for placement when pt is ready.

## 2018-10-01 NOTE — ROUTINE PROCESS
Bedside and Verbal shift change report given to Yasmeen Ochoa RN (oncoming nurse) by Gigi Turpin RN 
 (offgoing nurse). Report included the following information SBAR, Kardex, Intake/Output and MAR.

## 2018-10-01 NOTE — ROUTINE PROCESS
Bedside shift change report given to Tyron Orozco (oncoming nurse) by Froylan Holstein (offgoing nurse). Report included the following information SBAR, Kardex and MAR.

## 2018-10-01 NOTE — PROGRESS NOTES
Infectious Disease progress Note Requested by: dr. Randy Bobo Reason: gram positive bacteremia, pneumonia Current abx Prior abx Amp/sulbactam since 9/28 Levofloxacin 9/26-9/27 
azithromycin 9/27- 9/30 
ceftriaxone 9/27-9/28 Lines:  
 
 
Assessment : 
68 y.o. male with no known significant past medical history presented to ed on 9/26/18 with generalized weakness, inability to get out of his chair. CT chest: Bronchitis, bronchiolitis with severe cystic bronchiectasis worse in the bilateral lower lobes with bilateral lung base pneumonia and bilateral lower lobe cysts with air-fluid levels. Clinical picture consistent with bilateral pneumonia superimposed on underlying bronchiectasis, chronic lung disease. Exact microbial etiology of infection unclear; ?viral/atypical bacterial/aspiration pneumonia Swallow evaluation results indicated risk for aspiration. Hence, will modify abx to cover for this. Single positive blood culture for staph species 9/26 likely contaminant. Will f/u ID and repeat blood cultures to determine this. Recommendations: 1. cont amp/sulbactam 
2. Pulmonary evaluation for severe bronchiectasis, ? Need for cyst aspiration, outpatient follow up 
3. F/u cbc, clinically Advance Care planning: DNR: discussed  with patient/surrogate decision maker:Del ValleFranciscoLongwood: [phone] Above plan was discussed in details with patient, and dr Bernadette Vasquez. Please call me if any further questions or concerns. Will continue to participate in the care of this patient. subjective: 
 
Feels better. Denies chest pain, increased cough, chills, fever. Patient denies headaches, visual disturbances, sore throat,earaches,  abdominal pain, diarrhea, burning micturition, pain or weakness in extremities. He denies back pain/flank pain. There are no discharge medications for this patient. Current Facility-Administered Medications Medication Dose Route Frequency  magnesium oxide tablet 375 mg  375 mg Oral DAILY  acetaminophen (TYLENOL) tablet 500 mg  500 mg Oral Q6H PRN  
 lactobacillus sp. 50 billion cpu (BIO-K PLUS) capsule 1 Cap  1 Cap Oral DAILY  ampicillin-sulbactam (UNASYN) 3 g in 0.9% sodium chloride (MBP/ADV) 100 mL MBP  3 g IntraVENous Q6H  
 enoxaparin (LOVENOX) injection 40 mg  40 mg SubCUTAneous Q24H  
 influenza vaccine - (6 mos+)(PF) (FLUARIX QUAD/FLULAVAL QUAD) injection 0.5 mL  0.5 mL IntraMUSCular PRIOR TO DISCHARGE  ondansetron (ZOFRAN ODT) tablet 4 mg  4 mg Oral Q6H PRN  
 famotidine (PEPCID) tablet 20 mg  20 mg Oral BID  therapeutic multivitamin (THERAGRAN) tablet 1 Tab  1 Tab Oral DAILY  guaiFENesin ER (MUCINEX) tablet 600 mg  600 mg Oral Q12H Allergies: Cat dander and Pollen extracts Temp (24hrs), Av.8 °F (36 °C), Min:96.5 °F (35.8 °C), Max:97.1 °F (36.2 °C) Visit Vitals  /72 (BP 1 Location: Right arm)  Pulse (!) 105  Temp 96.6 °F (35.9 °C)  Resp 16  
 Ht 5' 11\" (1.803 m)  Wt 48.3 kg (106 lb 8 oz)  SpO2 98%  BMI 14.85 kg/m2 ROS: 12 point ROS obtained in details. Pertinent positives as mentioned in HPI,  
otherwise negative Physical Exam: 
 
 
Constitutional: He is oriented to person, place, and time. He appears well-nourished. Frail, elderly HENT:  
Head: Normocephalic and atraumatic. Eyes: EOM are normal. Pupils are equal, round, and reactive to light. Neck: Normal range of motion. Neck supple. Cardiovascular: Normal rate, regular rhythm and normal heart sounds. Exam reveals no friction rub. No murmur heard. Pulmonary/Chest: Effort normal. No respiratory distress. Bilateral LL rales, occasional wheezes Abdominal: Soft. He exhibits no distension. There is no tenderness. There is no rebound and no guarding. Musculoskeletal: Normal range of motion. Neurological: He is alert and oriented to person, place, and time. Skin: Skin is warm and dry. Psychiatric: He has a normal mood and affect. His behavior is normal. Thought content normal.  
  
 
Labs: Results:  
Chemistry Recent Labs  
   09/30/18 2082 GLU  80 NA  137  
K  3.8 CL  104 CO2  24 BUN  12  
CREA  0.59* CA  7.2* AGAP  9  
BUCR  20 CBC w/Diff Recent Labs  
   09/30/18 
 0737 WBC  7.4  
RBC  3.98* HGB  13.0 HCT  38.1 PLT  168 GRANS  81* LYMPH  7*  
EOS  3 Microbiology No results for input(s): CULT in the last 72 hours. RADIOLOGY: 
 
All available imaging studies/reports in [facility] for this admission were reviewed Dr. Selene Sears, Infectious Disease Specialist 
[phone] October 1, 2018 
1:31 PM

## 2018-10-02 LAB
25(OH)D2 SERPL-MCNC: <1 NG/ML
25(OH)D3 SERPL-MCNC: <1 NG/ML
25(OH)D3+25(OH)D2 SERPL-MCNC: 1.5 NG/ML
BACTERIA SPEC CULT: NORMAL
SERVICE CMNT-IMP: NORMAL

## 2018-10-02 PROCEDURE — 86430 RHEUMATOID FACTOR TEST QUAL: CPT | Performed by: INTERNAL MEDICINE

## 2018-10-02 PROCEDURE — 74011250637 HC RX REV CODE- 250/637: Performed by: INTERNAL MEDICINE

## 2018-10-02 PROCEDURE — 74011000250 HC RX REV CODE- 250: Performed by: INTERNAL MEDICINE

## 2018-10-02 PROCEDURE — 74011250636 HC RX REV CODE- 250/636: Performed by: INTERNAL MEDICINE

## 2018-10-02 PROCEDURE — 77030010545

## 2018-10-02 PROCEDURE — 77030037878 HC DRSG MEPILEX >48IN BORD MOLN -B

## 2018-10-02 PROCEDURE — 82784 ASSAY IGA/IGD/IGG/IGM EACH: CPT | Performed by: INTERNAL MEDICINE

## 2018-10-02 PROCEDURE — 74011250637 HC RX REV CODE- 250/637: Performed by: HOSPITALIST

## 2018-10-02 PROCEDURE — 97535 SELF CARE MNGMENT TRAINING: CPT

## 2018-10-02 PROCEDURE — 36415 COLL VENOUS BLD VENIPUNCTURE: CPT | Performed by: INTERNAL MEDICINE

## 2018-10-02 PROCEDURE — 94640 AIRWAY INHALATION TREATMENT: CPT

## 2018-10-02 PROCEDURE — 92526 ORAL FUNCTION THERAPY: CPT

## 2018-10-02 PROCEDURE — 77030013140 HC MSK NEB VYRM -A

## 2018-10-02 PROCEDURE — 97110 THERAPEUTIC EXERCISES: CPT

## 2018-10-02 PROCEDURE — 74011000258 HC RX REV CODE- 258: Performed by: INTERNAL MEDICINE

## 2018-10-02 PROCEDURE — 65660000000 HC RM CCU STEPDOWN

## 2018-10-02 PROCEDURE — 74011250636 HC RX REV CODE- 250/636: Performed by: HOSPITALIST

## 2018-10-02 PROCEDURE — 97530 THERAPEUTIC ACTIVITIES: CPT

## 2018-10-02 PROCEDURE — 77010033678 HC OXYGEN DAILY

## 2018-10-02 RX ORDER — IPRATROPIUM BROMIDE AND ALBUTEROL SULFATE 2.5; .5 MG/3ML; MG/3ML
3 SOLUTION RESPIRATORY (INHALATION)
Status: DISCONTINUED | OUTPATIENT
Start: 2018-10-02 | End: 2018-10-08

## 2018-10-02 RX ADMIN — AMPICILLIN SODIUM AND SULBACTAM SODIUM 3 G: 2; 1 INJECTION, POWDER, FOR SOLUTION INTRAMUSCULAR; INTRAVENOUS at 11:33

## 2018-10-02 RX ADMIN — THERA TABS 1 TABLET: TAB at 08:58

## 2018-10-02 RX ADMIN — IPRATROPIUM BROMIDE AND ALBUTEROL SULFATE 3 ML: .5; 3 SOLUTION RESPIRATORY (INHALATION) at 16:45

## 2018-10-02 RX ADMIN — ENOXAPARIN SODIUM 40 MG: 100 INJECTION SUBCUTANEOUS at 18:52

## 2018-10-02 RX ADMIN — FAMOTIDINE 20 MG: 20 TABLET ORAL at 08:58

## 2018-10-02 RX ADMIN — Medication 375 MG: at 08:58

## 2018-10-02 RX ADMIN — GUAIFENESIN 600 MG: 600 TABLET, EXTENDED RELEASE ORAL at 08:58

## 2018-10-02 RX ADMIN — Medication 1 CAPSULE: at 08:58

## 2018-10-02 RX ADMIN — FAMOTIDINE 20 MG: 20 TABLET ORAL at 18:52

## 2018-10-02 RX ADMIN — AMPICILLIN SODIUM AND SULBACTAM SODIUM 3 G: 2; 1 INJECTION, POWDER, FOR SOLUTION INTRAMUSCULAR; INTRAVENOUS at 18:52

## 2018-10-02 RX ADMIN — CHOLESTYRAMINE 4 G: 4 POWDER, FOR SUSPENSION ORAL at 08:58

## 2018-10-02 RX ADMIN — CHOLESTYRAMINE 4 G: 4 POWDER, FOR SUSPENSION ORAL at 11:33

## 2018-10-02 RX ADMIN — GUAIFENESIN 600 MG: 600 TABLET, EXTENDED RELEASE ORAL at 20:43

## 2018-10-02 RX ADMIN — AMPICILLIN SODIUM AND SULBACTAM SODIUM 3 G: 2; 1 INJECTION, POWDER, FOR SOLUTION INTRAMUSCULAR; INTRAVENOUS at 05:40

## 2018-10-02 NOTE — PROGRESS NOTES
Problem: Self Care Deficits Care Plan (Adult) Goal: *Acute Goals and Plan of Care (Insert Text) Occupational Therapy Goals Initiated 9/29/2018 within 7 day(s). 1.  Patient will perform grooming with supervision/set-up 2. Patient will perform upper body dressing with supervision/set-up. 3.  Patient will perform lower body dressing with moderate assistance with AE prn. 
4.  Patient will perform BSC transfers with maximal assistance. 5.  Patient will perform all aspects of toileting with moderate assistance . Outcome: Progressing Towards Goal 
Occupational Therapy TREATMENT Patient: Yolie Patrick (51 y.o. male) Date: 10/2/2018 Diagnosis: Acute bronchitis with COPD ([address] SOB (shortness of breath) Hypoxia Community acquired pneumonia Precautions: Fall, Aspiration, Skin Chart, occupational therapy assessment, plan of care, and goals were reviewed. ASSESSMENT: 
Pt supine in bed, cleared by nursing for tx. Pt agreeable to therapy, however resistive to OOB activity despite Max education. Pt was seen with PT to maximize pt safety and carryover of cues for OOB activity; PT focused on mobility while this ROMAN assisted with bowel hygiene/ADLs. Pt reporting he thinks he was incontinent of bowels, stating he did call for nursing. Nursing unaware; CNA Tyler Wolf assisted with bowel hygiene (Thank you) with Total A given. Pt completed bed mobility to aid in hygiene/linens changed. In supine, pt was given Mod A and vc to assist with pericare hygiene. New condom cath placed by CNA. Pt completed hand hygiene with Min A and vc for thoroughness; Min A given to assist in cleaning finger nails (pt with very soiled B hands). OOB mobility withheld this session 2/2 pt fatigue after bed mobility and incontinence. PT assisted pt with LE TherEx while this therapist aided pt with hand hygiene.  Pt presented with loud audible gargle throughout session and is somewhat self-limiting at times. All needs left within reach and nursing notified of pt with second (?) lunch tray in room, requesting its removal.   
Progression toward goals: 
[x]          Improving appropriately and progressing toward goals 
[]          Improving slowly and progressing toward goals 
[]          Not making progress toward goals and plan of care will be adjusted PLAN: 
Patient continues to benefit from skilled intervention to address the above impairments. Continue treatment per established plan of care. Discharge Recommendations:  Joel Roberts Further Equipment Recommendations for Discharge:  bedside commode G-CODES:  
 
Self Care  Current  CL= 60-79%  Goal  CJ= 20-39%. The severity rating is based on the Level of Assistance required for Functional Mobility and ADLs. SUBJECTIVE:  
Patient stated Get that away, I already ate that! Y'all are hard headed.  (Lunch tray in room for pt, pt reporting he already ate lunch and does not want his tray. Unsure if this is his first or second lunch tray; nurse notified) OBJECTIVE DATA SUMMARY:  
Cognitive/Behavioral Status: 
Neurologic State: Alert Orientation Level: Oriented to person, Disoriented to situation Cognition: Follows commands Safety/Judgement: Fall prevention, Decreased insight into deficits, Decreased awareness of need for safety Functional Mobility and Transfers for ADLs: 
 Bed Mobility: 
Rolling: Minimum assistance Scooting: Total assistance;Assist x2 ADL Intervention: 
Grooming Grooming Assistance: Minimum assistance (for thoroughness ) Washing Hands: Minimum assistance Cues: Verbal cues provided Upper Body Dressing Assistance Hospital Gown: Contact guard assistance Cues: Verbal cues provided (for participation ) Toileting Toileting Assistance: Maximum assistance; Total assistance(dependent) Bladder Hygiene: Moderate assistance (condom cath placed; pt assisted minimally ) Bowel Hygiene: Maximum assistance; Total assistance (dependent) Clothing Management: Minimum assistance Cues: Verbal cues provided Cognitive Retraining Safety/Judgement: Fall prevention;Decreased insight into deficits; Decreased awareness of need for safety Pain: 
Pt reports 0/10 pain or discomfort prior to treatment.   
Pt reports 0/10 pain or discomfort post treatment. Activity Tolerance:   
Fair Please refer to the flowsheet for vital signs taken during this treatment. After treatment:  
[]  Patient left in no apparent distress sitting up in chair 
[x]  Patient left in no apparent distress in bed 
[x]  Call bell left within reach [x]  Nursing notified 
[]  Caregiver present 
[]  Bed alarm activated THU Somers Time Calculation: 33 mins

## 2018-10-02 NOTE — PROGRESS NOTES
Problem: Mobility Impaired (Adult and Pediatric) Goal: *Acute Goals and Plan of Care (Insert Text) Physical Therapy Goals Initiated 9/29/2018 and to be accomplished within 7 day(s) 1. Patient will move from supine to sit and sit to supine , scoot up and down and roll side to side in bed with supervision/set-up. 2.  Patient will transfer from bed to chair and chair to bed with minimal assistance using the least restrictive device. 3.  Patient will perform sit to stand with minimal assistance/contact guard assist. 
4.  Patient will ambulate with minimal assistance for 5-10 feet with the least restrictive device. Outcome: Progressing Towards Goal 
physical Therapy TREATMENT Patient: Shad Lombardo (61 y.o. male) Date: 10/2/2018 Diagnosis: Acute bronchitis with COPD ([address] SOB (shortness of breath) Hypoxia Community acquired pneumonia Precautions: Fall, Aspiration, Skin Chart, physical therapy assessment, plan of care and goals were reviewed. OBJECTIVE/ ASSESSMENT: 
Nursing cleared pt to participate with PT. Patient found semi reclined in bed willing to work with PT with encouragement. Patient seen with OT to maximize safety of patient and staff. Pt was very adamant about not wanting to get out of bed and sit up in recliner chair this tx despite max encouragement. Educated pt on importance of OOB activity and role of PT. Pt then stated that he had a BM in bed and needed to be cleaned up which is why he felt he could not participate with PT/OT. Pt performed rolling from side to side with Min A for kim care to be performed. Upon rolling pt continued to have another BM. Pt then performed supine therex (see below). Pt scooted towards HOB with total Ax2. Pt was left comfortably semi reclined in bed with all needs in reach, scd's donned, and nursing notified. Education: importance of OOB activity, WB status, bed mobility, therex,  
Progression toward goals: []      Improving appropriately and progressing toward goals [x]      Improving slowly and progressing toward goals 
[]      Not making progress toward goals and plan of care will be adjusted PLAN: 
Patient continues to benefit from skilled intervention to address the above impairments. Continue treatment per established plan of care. Discharge Recommendations:  Joel Roberts Further Equipment Recommendations for Discharge:  rolling walker SUBJECTIVE:  
Patient stated No No No I don't want to get up OBJECTIVE DATA SUMMARY:  
Critical Behavior: 
Neurologic State: Alert Orientation Level: Oriented to person, Disoriented to situation Cognition: Follows commands Safety/Judgement: Fall prevention, Decreased insight into deficits, Decreased awareness of need for safety Functional Mobility Training: 
Bed Mobility: 
Rolling: Minimum assistance Scooting: Total assistance;Assist x2 Therapeutic Exercises:  
 
 
EXERCISE Sets Reps Active Active Assist  
Passive Self- assited ROM Comments Ankle Pumps 1 15  [x] [] [] [] Quad Sets   [] [] [] [] Glut Sets   [] [] [] [] Short Arc Quads   [] [] [] [] Heel Slides 1 10 [x] [] [] [] Straight Leg Raises   [] [] [] [] Hip Abd 1 10 [x] [] [] [] Long Arc Quads   [] [] [] [] Seated Marching   [] [] [] [] Seated Knee Flexion   [] [] [] []   
Standing Marching   [] [] [] []   
 
Pain: 
Pre tx pain: 0/10 Post tx pain: not rated Activity Tolerance:  
poor Please refer to the flowsheet for vital signs taken during this treatment. After treatment:  
[] Patient left in no apparent distress sitting up in chair 
[x] Patient left in no apparent distress in bed 
[x] Call bell left within reach [x] Nursing notified 
[] Caregiver present 
[] Bed alarm activated 
[x] SCDs applied 
[] Ice applied [employer] [facility], [facility] Time Calculation: 33 mins Mobility [id number] Current  CM= 80-99%.   The severity rating is based on the Level of Assistance required for Functional Mobility and ADLs. Mobility   Goal  CJ= 20-39%. The severity rating is based on the Level of Assistance required for Functional Mobility and ADLs.

## 2018-10-02 NOTE — PROGRESS NOTES
Problem: Dysphagia (Adult) Goal: *Acute Goals and Plan of Care (Insert Text) Patient will: 1. Tolerate PO trials with 0 s/s overt distress in 4/5 trials 2. Utilize compensatory swallow strategies/maneuvers (decrease bite/sip, size/rate, alt. liq/sol) with min cues in 4/5 trials 3. Perform oral-motor/laryngeal exercises to increase oropharyngeal swallow function with min cues 4. Complete an objective swallow study (i.e., MBSS) to assess swallow integrity, r/o aspiration, and determine of safest LRD, min A - met 9/28/18 Rec:    
Reg solid with nectar-thick liquids Aspiration precautions HOB >45 during po intake, remain >30 for 30-45 minutes after po Small bites/sips; alternate liquid/solid with slow feeding rate Oral care TID Meds per pt preference SLP services when DC from this facility Outcome: Not Met Speech language pathology dysphagia treatment Patient: Michela Phillips (73 y.o. male) Date: 10/2/2018 Diagnosis: Acute bronchitis with COPD ([address] SOB (shortness of breath) Hypoxia Community acquired pneumonia Precautions:  Fall, Aspiration, Skin ASSESSMENT: 
Pt seen b/s for dysphagia tx. Pt awake, alert, denied odynophagia, foods sticking or coughing with meals. Pt accepted straw sips nectar and regular solids. Pt demo improved mastication this date, timely mastication with good A-P transit, mildly delayed swallow response with weak laryngeal elevation, no overt s/sx aspiration following trials. Pt required max verbal cues for small sips and 2 swallows. Strategy written on pt's wipe board in room. Rec: continue regular solids with NECTAR thick liquids, aspiration precautions. SLP will continue to follow. Progression toward goals: 
[]         Improving appropriately and progressing toward goals [x]         Improving slowly and progressing toward goals 
[]         Not making progress toward goals and plan of care will be adjusted PLAN: 
 Recommendations and Planned Interventions: 
continue regular solids with NECTAR thick liquids, aspiration precautions. Patient continues to benefit from skilled intervention to address the above impairments. Continue treatment per established plan of care. Discharge Recommendations:  Joel Roberts SUBJECTIVE:  
Patient stated I sit like this [to eat- pt <30*, elevated HOB to 50*]. OBJECTIVE:  
Cognitive and Communication Status: 
Neurologic State: Alert Orientation Level: Oriented to person, Disoriented to situation Cognition: Follows commands Perception: Appears intact Perseveration: No perseveration noted Safety/Judgement: Fall prevention, Decreased insight into deficits, Decreased awareness of need for safety Dysphagia Treatment: 
Oral Assessment: 
Oral Assessment Labial: No impairment Dentition: Natural, Intact Oral Hygiene: fair Lingual: No impairment Velum: Unable to visualize Mandible: No impairment P.O. Trials: 
 Patient Position: HOB 50* Vocal quality prior to P.O.: No impairment Consistency Presented: Nectar thick liquid, Solid How Presented: Self-fed/presented, Straw Bolus Acceptance: No impairment Bolus Formation/Control: No impairment Type of Impairment: Mastication Propulsion: No impairment Oral Residue: None Initiation of Swallow: Delayed (# of seconds) Laryngeal Elevation: Weak Aspiration Signs/Symptoms: None Pharyngeal Phase Characteristics: Audible swallow, Suspected pharyngeal residue Effective Modifications: Alternate liquids/solids, Double swallow, Small sips and bites Cues for Modifications: Maximal 
   
 
 Oral Phase Severity: No impairment Pharyngeal Phase Severity : Mild PAIN: 
Start of Tx: 0 End of Tx: 0 After treatment:  
[]              Patient left in no apparent distress sitting up in chair 
[x]              Patient left in no apparent distress in bed 
[x]              Call bell left within reach [x]              Nursing notified 
[]              Family present 
[]              Caregiver present 
[]              Bed alarm activated COMMUNICATION/EDUCATION:  
[x] Aspiration precautions; swallow safety; compensatory techniques []        Patient unable to participate in education; education ongoing with staff [x]  Posted safety precautions in patient's room. [] Oral-motor/laryngeal strengthening exercises Fautsino Monte MS, CCC/SLP Time Calculation: 13 mins

## 2018-10-02 NOTE — ROUTINE PROCESS
Rec'd pt awake and alert. Pt denies pain. Pt has multiple bruised. Call bell within reach. Will continue to monitor pt. 
2400 Pt is resting quietly in bed. Breathing regular and non labored. 0745 Bedside shift change report given to [facility] RN/Teresa RN (oncoming nurse) by Whitney Lucas (offgoing nurse). Report given with SBAR, Kardex, Intake/Output, MAR and Recent Results.

## 2018-10-02 NOTE — PROGRESS NOTES
Subjective: 
Symptoms:  He reports shortness of breath, weakness and anorexia. (Feeling tired, appetite poor). Diet:  Poor intake. Activity level: Impaired due to weakness. Pain:  He complains of pain that is mild. Pain is well controlled. Temp:  [96.3 °F (35.7 °C)-97.8 °F (36.6 °C)] Pulse (Heart Rate):  [] BP: (110-151)/(61-90) Resp Rate:  [16-20] O2 Sat (%):  [93 %-100 %] Weight:  [47.8 kg (105 lb 4.8 oz)-48.3 kg (106 lb 8 oz)] 09/30 0701 - 10/01 1900 In: -  
Out: 668 [IOTIC:167] Objective: 
General Appearance:  Ill-appearing. Vital signs: (most recent): Blood pressure 139/77, pulse (!) 104, temperature 97 °F (36.1 °C), resp. rate 16, height 5' 11\" (1.803 m), weight 49.2 kg (108 lb 6.4 oz), SpO2 91 %. Output: Producing urine. HEENT: Normal HEENT exam.   
Lungs: There are rhonchi and decreased breath sounds. Heart: Normal rate. No friction rub. Chest: No chest wall tenderness. Extremities: There is no dependent edema. Neurological: Patient is alert and oriented to person, place and time. Skin:  Warm and dry. Abdomen: Abdomen is soft and non-distended. Bowel sounds are normal.   There is no guarding. Pupils:  Pupils are equal, round, and reactive to light. Pulses: Distal pulses are intact. CTA Chest 9/26.2018 1. No evidence for pulmonary emboli. 2.  Bronchitis, bronchiolitis with severe cystic bronchiectasis worse in the 
bilateral lower lobes with bilateral lung base pneumonia and bilateral lower 
lobe cysts with air-fluid levels. 3.  Asbestos related pleural disease. 4.  Right atrial and right ventricle enlargement. 5.  Aberrant right subclavian artery Principal Problem: 
  Community acquired pneumonia (9/27/2018) Active Problems: 
  SOB (shortness of breath) (9/26/2018) Hypoxia (9/26/2018) Acute bronchitis with COPD ([facility] [address] (9/26/2018) Pain of left hip joint (9/27/2018) Fall at home (9/27/2018) Assessment & Plan Continue amp/sulbactam 
Consult pulmonary regarding bronchiectasis and lower lobe cysts Continue speech therapy  involved in disposition planning, possibly [facility] with his mother DNR Navin Darby DO

## 2018-10-02 NOTE — PROGRESS NOTES
Patient states medication questran light causes diarrhea. Had watery stool after morning dose and lunchtime dose. Evening dose held. Continue to monitor.

## 2018-10-02 NOTE — PROGRESS NOTES
NUTRITION Nursing Referral: [facility] Nutrition Consult: General Nutrition Management & Supplements RECOMMENDATIONS / PLAN:  
 
- Modify supplements: Magic Cup TID. - Monitor and encourage po intake as tolerated. - Continue RD inpatient monitoring and evaluation. NUTRITION INTERVENTIONS & DIAGNOSIS:  
 
[x] Meals/snacks: general/healthful diet [x] Medical food supplement therapy: Ensure Enlive TID- modify Nutrition Diagnosis:  
Underweight related to inadequate energy intake as evidenced by pt BMI of 14 kg/m^2 upon admission. Unintended weight loss related to inadequate energy intake as evidenced by 14 lb, 11% weight loss from reported usual weight. ASSESSMENT:  
 
10/2: Pt reports good meal intake today but per chart pt with poor intake/appetite. Nursing unsure about meal intake today, with plan to monitor. Dislikes Ensure, plan to modify. Electrolytes replaced. 9/27: Pt reports good appetite and eating well PTA, consuming lost of sweets. Hungry and has not eaten today, diet started after swallow evaluation by SLP. Agreeable to supplements. Average po intake adequate to meet patients estimated nutritional needs:   [] Yes     [] No   [x] Unable to determine at this time Diet: DIET NUTRITIONAL SUPPLEMENTS Breakfast, Lunch, Dinner; ENSURE ENLIVE 
DIET REGULAR 1 NECTAR Food Allergies: NKFA Current Appetite:   [] Good     [x] Fair     [] Poor     [] Other: 
Appetite/meal intake prior to admission:   [x] Good     [] Fair     [] Poor     [] Other: 
Feeding Limitations:  [] Swallowing difficulty    [] Chewing difficulty    [x] Other: SLP following, MBS completed 9/28/18 Current Meal Intake:  
Patient Vitals for the past 100 hrs: 
 % Diet Eaten  
09/28/18 1231 25 % BM: 10/2 Skin Integrity: WDL Edema:   [x] No     [] Yes Pertinent Medications: Reviewed: zofran, lactobacillus, theragran Recent Labs  
   09/30/18 
 5742 NA  137  
K  3.8 CL  104 CO2  24 GLU  80 BUN  12  
 CREA  0.59* CA  7.2*  
MG  1.7 Intake/Output Summary (Last 24 hours) at 10/02/18 1512 Last data filed at 10/02/18 1347 Gross per 24 hour Intake              320 ml Output              235 ml Net               85 ml Anthropometrics: 
Ht Readings from Last 1 Encounters:  
09/28/18 5' 11\" (1.803 m) Last 3 Recorded Weights in this Encounter  
 09/29/18 0434 09/30/18 0438 10/02/18 0530 Weight: 47.8 kg (105 lb 4.8 oz) 48.3 kg (106 lb 8 oz) 49.2 kg (108 lb 6.4 oz) Body mass index is 15.12 kg/(m^2). Underweight Weight History: patient reports usual weight of 130 lb, unknown time period of weight loss (14 lb, 11% weight loss) Weight Metrics 10/2/2018 Weight 108 lb 6.4 oz BMI 15.12 kg/m2 Admitting Diagnosis: Acute bronchitis with COPD ([address] SOB (shortness of breath) Hypoxia Pertinent PMHx: No pertinent PMH Education Needs:        [x] None identified  [] Identified - Not appropriate at this time  []  Identified and addressed - refer to education log Learning Limitations:   [x] None identified  [] Identified Cultural, Scientology & ethnic food preferences:  [x] None identified    [] Identified and addressed ESTIMATED NUTRITION NEEDS:  
 
Calories: 8596-9564 kcal (MSJx1.2-1.5) based on  [x] Actual BW 53 kg     [] IBW Protein: 64-80 gm (1.2-1.5 gm/kg) based on  [x] Actual BW      [] IBW Fluid: 1 mL/kcal 
  
MONITORING & EVALUATION:  
 
Nutrition Goal(s): 1. Po intake of meals will meet >75% of patient estimated nutritional needs within the next 7 days. Outcome:  [] Met/Ongoing    [x]  Not Met/Progressing    [] New/Initial Goal  
 
Monitoring:   [x] Food and beverage intake   [x] Diet order   [x] Nutrition-focused physical findings   [x] Treatment/therapy   [] Weight   [] Enteral nutrition intake Previous Recommendations (for follow-up assessments only):     [x]   Implemented       []   Not Implemented (RD to address)      [] No Longer Appropriate     [] No Recommendation Made Discharge Planning: regular diet [x] Participated in care planning, discharge planning, & interdisciplinary rounds as appropriate Reinaldo Xie RD Pager: [phone]

## 2018-10-02 NOTE — PROGRESS NOTES
Subjective: 
Symptoms:  He reports shortness of breath, weakness and anorexia. (Feeling tired, appetite poor). Diet:  Poor intake. Activity level: Impaired due to weakness. Pain:  He complains of pain that is mild. Pain is well controlled. Temp:  [96.5 °F (35.8 °C)-97.6 °F (36.4 °C)] Pulse (Heart Rate):  [] BP: (119-151)/(61-90) Resp Rate:  [16-20] O2 Sat (%):  [91 %-100 %] Weight:  [48.3 kg (106 lb 8 oz)-49.2 kg (108 lb 6.4 oz)] 10/02 0701 - 10/02 1900 In: -  
Out: 60 [Urine:60] 
09/30 1901 - 10/02 0700 In: 320 [P.O.:120; I.V.:200] Out: 775 [Urine:775] Objective: 
General Appearance:  Ill-appearing. Vital signs: (most recent): Blood pressure 136/79, pulse 99, temperature 96.8 °F (36 °C), resp. rate 18, height 5' 11\" (1.803 m), weight 49.2 kg (108 lb 6.4 oz), SpO2 95 %. Output: Producing urine. HEENT: Normal HEENT exam.   
Lungs: There are rhonchi and decreased breath sounds. Heart: Normal rate. No friction rub. Chest: No chest wall tenderness. Extremities: There is no dependent edema. Neurological: Patient is alert and oriented to person, place and time. Skin:  Warm and dry. Abdomen: Abdomen is soft and non-distended. Bowel sounds are normal.   There is no guarding. Pupils:  Pupils are equal, round, and reactive to light. Pulses: Distal pulses are intact. CTA Chest 9/26.2018 1. No evidence for pulmonary emboli. 2.  Bronchitis, bronchiolitis with severe cystic bronchiectasis worse in the 
bilateral lower lobes with bilateral lung base pneumonia and bilateral lower 
lobe cysts with air-fluid levels. 3.  Asbestos related pleural disease. 4.  Right atrial and right ventricle enlargement. 5.  Aberrant right subclavian artery Principal Problem: 
  Community acquired pneumonia (9/27/2018) Active Problems: 
  SOB (shortness of breath) (9/26/2018) Hypoxia (9/26/2018) [facility] with [facility] (9/26/2018) Pain of left hip joint (9/27/2018) Fall at home (9/27/2018) Assessment & Plan Continue amp/sulbactam 
Consult pulmonary regarding bronchiectasis and lower lobe cysts Continue speech therapy  involved in disposition planning, possibly [facility] with his mother DNR Demetri Carter DO

## 2018-10-02 NOTE — CONSULTS
[facility]  Pulmonary, Critical Care, and Sleep Medicine    Initial Patient Consult    Name: Shad Lombardo MRN: [id number]   : 1945 Hospital: [facility]   Date: 10/2/2018        IMPRESSION:   · Chronic bronchiectasis and cystic lung disease with superinfection causing air fluid levels and micro- macroabscess formation and suppurative lower respiratory infection. Chronicity unclear but suspect long standing process with decompensation. 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 ? Common variable immune deficiency CVID. Currently being treated with antibiotics to cover Aspiration and CAP pathogens- agree. In addition at risk for pseudomonas infection and also concern for atypical mycobacterial infection. · Bronchitis, bronchiolitis with severe cystic bronchiectasis worse in the bilateral lower lobes with bilateral lung base pneumonia and bilateral lower lobe cysts with air-fluid levels  · Hypoxic respiratory insufficiency  · Pulmonary HTN  · Dysphagia- aspiration risk  · Low BMI  · S/P fall- pelvic ring injury      RECOMMENDATIONS:   · Oxygen- titrate to SaO2 goal >92%  · Bronchial hygiene protocol- will likely need hypertonic saline, VEST percussion and postoral drainage to facilitate clearing mucus. Should be discharged with VEST  · Bronchodilators need to be added to facilitate mucociliary clearance  · Steroids- not indiacated  · Antibiotics- agree with current coverage. Will check additional cultures  · Aspiration precautions  · Need for further diagnostics- follow up CT scan in 12 weeks. Will check sputum eosinophils  · No indication for bronchoscopy  · Out patient testing- PFT, 6 min walk, Polysomnogram  · Assess home Oxygen needs at discharge  · OT, PT, OOB and ambulate  · Healthy weight, nutrition  · DNR  · Will Follow  · DVT, PUD prophylaxis     Subjective:      This patient has been seen and evaluated at the request of Dr. Antunez Nearing for bilateral pneumonia and lung cysts with bronchiectasis. Patient is a 68 y.o. male Ines Khna is a 68 y.o. male who had been living with his mother, who was very recently [place] [facility] [place] The patient presents to the ER stating he has been coughing and wheezing. He adds that he has fallen several times at home and has been \"stuck in a chair\" unable to get up due to pain in left leg after his last fall onto his legs. Reportedly family checked on him to find the patient siting in excrement in chair and called medics. In the ER the patient was hypoxic 82% pulse ox on room air and tachycardic and has required O2 4l nc now titrated to 2l for pulse ox 93%. C/o cough with difficulty expectorating mucus. Denies any SOB. Denies chest pain. Denies nausea, vomiting, fever/ chills  Admits to sinus congestion and drainage  Worked in a [employer]. 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 Non smoker but significant second hand smoke exposure  Denies ever being diagnosed with Pulmonary problems. History reviewed. No pertinent past medical history. History reviewed. No pertinent surgical history. Prior to Admission medications    Not on File     Allergies   Allergen Reactions    Cat Dander Sneezing    Pollen Extracts Runny Nose      Social History   Substance Use Topics    Smoking status: Not on file    Smokeless tobacco: Not on file    Alcohol use Not on file      History reviewed. No pertinent family history.      Current Facility-Administered Medications   Medication Dose Route Frequency    albuterol-ipratropium (DUO-NEB) 2.5 MG-0.5 MG/3 ML  3 mL Nebulization QID RT    cholestyramine-aspartame (QUESTRAN LIGHT) packet 4 g  4 g Oral TID WITH MEALS    lactobacillus sp. 50 billion cpu (BIO-K PLUS) capsule 1 Cap  1 Cap Oral DAILY    ampicillin-sulbactam (UNASYN) 3 g in 0.9% sodium chloride (MBP/ADV) 100 mL MBP  3 g IntraVENous Q6H    enoxaparin (LOVENOX) injection 40 mg  40 mg SubCUTAneous Q24H    influenza vaccine - (6 mos+)(PF) (FLUARIX QUAD/FLULAVAL QUAD) injection 0.5 mL  0.5 mL IntraMUSCular PRIOR TO DISCHARGE    famotidine (PEPCID) tablet 20 mg  20 mg Oral BID    therapeutic multivitamin (THERAGRAN) tablet 1 Tab  1 Tab Oral DAILY    guaiFENesin ER (MUCINEX) tablet 600 mg  600 mg Oral Q12H       Review of Systems:  A comprehensive review of systems was negative except for that written in the HPI. Objective:   Vital Signs:    Visit Vitals    /74 (BP 1 Location: Right arm, BP Patient Position: At rest)    Pulse 97    Temp 97.7 °F (36.5 °C)    Resp 18    Ht 5' 11\" (1.803 m)    Wt 49.2 kg (108 lb 6.4 oz)    SpO2 96%    BMI 15.12 kg/m2       O2 Device: Nasal cannula   O2 Flow Rate (L/min): 2 l/min   Temp (24hrs), Av.3 °F (36.3 °C), Min:96.8 °F (36 °C), Max:97.7 °F (36.5 °C)       Intake/Output:   Last shift:      10/02 1901 - 10/03 0700  In: -   Out: 100 [Urine:100]  Last 3 shifts: 10/01 0701 - 10/02 1900  In: 320 [P.O.:120; I.V.:200]  Out: 235 [Urine:235]    Intake/Output Summary (Last 24 hours) at 10/02/18 1930  Last data filed at 10/02/18 190   Gross per 24 hour   Intake              320 ml   Output              335 ml   Net              -15 ml      Physical Exam:   General:  Alert, cooperative, no distress, appears stated age. Head:  Normocephalic, without obvious abnormality, atraumatic. Eyes:  Conjunctivae/corneas clear. PERRL, EOMs intact. Nose: Nares normal. Septum midline. Mucosa normal. Minimal drainage no sinus tenderness. Throat: Lips, mucosa, and tongue normal. Teeth and gums normal.   Neck: Supple, symmetrical, trachea midline, no adenopathy, thyroid: no enlargment/tenderness/nodules, no carotid bruit and no JVD. Back:   Symmetric, no curvature. ROM normal.   Lungs:   Bilateral diffuse ronchi, rales   Chest wall:  No tenderness or deformity. Heart:  Regular rate and rhythm, S1, S2 normal, no murmur, click, rub or gallop.    Abdomen: Soft, non-tender. Bowel sounds normal. No masses,  No organomegaly. Extremities: Extremities normal, atraumatic, no cyanosis or edema. Pulses: 2+ and symmetric all extremities. Skin: Skin dry, scaly and ecchymotic   Lymph nodes: Cervical, supraclavicular, and axillary nodes normal.   Neurologic: Grossly nonfocal     Data review:   No results found for this or any previous visit (from the past 24 hour(s)). Imaging:  I have personally reviewed the patients radiographs and have reviewed the reports:  XR Results (most recent):    Results from Hospital Encounter encounter on 09/26/18   XR SWALLOW FUN VIDEO   Narrative EXAM:  Modified Barium Swallow (Video Barium Swallow). CPT  [id number]    CLINICAL INDICATION:     - Bronchitis, family with reports of coughing with PO, pt with reports of  difficulty swallowing.    - Bedside exam with mild pharyngeal dysphagia. Weak laryngeal elevation to  palpation with all tested bedside boluses. Full MBS felt to be needed by the  bedside exam to rule out occult silent aspiration. COMPARISON:  None. TECHNIQUE:      - This study was performed in conjunction with speech pathologist with the  patient in lateral upright position.    - Tested consistencies: Thin (via cup/straw), nectar (via cup), puree, solid,  and tablet challenge utilizing nectar consistency wash. - Fluoroscopy duration:  1 minute 42 seconds. - Exposures:  7 cinefluoroscopy loops. FINDINGS:    There was moderate penetration on thin consistency boluses through both the cup  and straw. No definite penetration or aspiration was noted on other tested  boluses. Vallecular and piriform sinus residuals were observed with all tested  consistencies. Impression IMPRESSION:    1. Moderate penetration on thin liquids with both cup and straw. No collin  aspiration or penetration on other tested consistencies. 2.  Vallecular and piriform sinus residuals with all tested boluses.       Thank you for your referral.              CT Results (most recent):    Results from Hospital Encounter encounter on 09/26/18   CT PELV WO CONT   Narrative CT pelvis without IV contrast    HISTORY: Patient fell. Pain. Reference: Radiographs September 27, 2018    All CT scans at this facility are performed using dose optimization technique as  appropriate to a performed exam, to include automated exposure control,  adjustment of the mA and/or kV according to patient size (including appropriate  matching for site specific examination) or use of iterative reconstruction  technique. Axial CT images were obtained. Sagittal and coronal images were reformatted. Osteoporosis. Slightly displaced oblique fracture in the right ilium, from the mid iliac crest  extending to slightly above the acetabular roof. Suspect mild fracture at the  lateral margin of the sacrum, at the sacroiliac joint on the right and also on  the left. Subacute or old fracture in the right inferior pubic ramus. Pubic symphysis is  intact. No definite displaced fracture in the left pubic rami. Advanced degenerative joint disease in both hips with prominent osteophytes. No  acute femoral neck fracture or hip dislocation. No hip joint effusion. No internal pelvic hematoma. Mild residual high density within the bladder,  likely from IV contrast received on September 26, 2018. Prostate is slightly  enlarged with central calcification. Moderately severe fecal retention in the  distended rectosigmoid. No pelvic inflammation. Normal appendix. No free fluid. Impression IMPRESSION:    1. Acute, slightly displaced right iliac fracture. Also suspect subtle acute  fracture in the sacral side of the sacroiliac joints bilaterally. 2. Subacute or old right inferior pubic ramus fracture. 3. Additional findings as described. 09/26/18   ECHO ADULT COMPLETE 09/28/2018 9/28/2018    Narrative · Calculated left ventricular ejection fraction is 60%. Visually measured   ejection fraction. Left ventricular cavity size is dilated. Left   ventricular moderate concentric hypertrophy. Normal left ventricular wall   motion, no regional wall motion abnormality noted. Mild (grade 1) left   ventricular diastolic dysfunction. · Mild tricuspid valve regurgitation is present. Pulmonary arterial   systolic pressure is 48 mmHg. Mild to moderate pulmonary hypertension is   present. · Mitral valve non-specific thickening. Mitral annular calcification. · Mild aortic valve regurgitation is present.         Signed by: MD Rolando Musa MD

## 2018-10-02 NOTE — PROGRESS NOTES
Infectious Disease progress Note Requested by: dr. Silvana Bai Reason: gram positive bacteremia, pneumonia Current abx Prior abx Amp/sulbactam since 9/28 Levofloxacin 9/26-9/27 
azithromycin 9/27- 9/30 
ceftriaxone 9/27-9/28 Lines:  
 
 
Assessment : 
68 y.o. male with no known significant past medical history presented to ed on 9/26/18 with generalized weakness, inability to get out of his chair. CT chest: Bronchitis, bronchiolitis with severe cystic bronchiectasis worse in the bilateral lower lobes with bilateral lung base pneumonia and bilateral lower lobe cysts with air-fluid levels. Clinical picture consistent with bilateral pneumonia superimposed on underlying bronchiectasis, chronic lung disease. Exact microbial etiology of infection unclear; ?viral/atypical bacterial/aspiration pneumonia Swallow evaluation results indicated risk for aspiration. Hence, will modify abx to cover for this. Single positive blood culture for staph species 9/26 likely contaminant. Will f/u ID and repeat blood cultures to determine this. Recommendations: 1. cont amp/sulbactam 
2. Pulmonary evaluation for severe bronchiectasis, ? Need for cyst aspiration, outpatient follow up 
3. F/u cbc, clinically 4. Will switch to po abx if no further intervention planned by pulmonary Advance Care planning: DNR: discussed  with patient/surrogate decision maker:Josseline Del Valle: [phone] Above plan was discussed in details with patient, and dr Amna Lauren. Please call me if any further questions or concerns. Will continue to participate in the care of this patient. subjective: 
 
Feels better. Denies chest pain, increased cough, chills, fever. Patient denies headaches, visual disturbances, sore throat,earaches,  abdominal pain, diarrhea, burning micturition, pain or weakness in extremities. He denies back pain/flank pain. There are no discharge medications for this patient. Current Facility-Administered Medications Medication Dose Route Frequency  cholestyramine-aspartame (QUESTRAN LIGHT) packet 4 g  4 g Oral TID WITH MEALS  magnesium oxide tablet 375 mg  375 mg Oral DAILY  acetaminophen (TYLENOL) tablet 500 mg  500 mg Oral Q6H PRN  
 lactobacillus sp. 50 billion cpu (BIO-K PLUS) capsule 1 Cap  1 Cap Oral DAILY  ampicillin-sulbactam (UNASYN) 3 g in 0.9% sodium chloride (MBP/ADV) 100 mL MBP  3 g IntraVENous Q6H  
 enoxaparin (LOVENOX) injection 40 mg  40 mg SubCUTAneous Q24H  
 influenza vaccine - (6 mos+)(PF) (FLUARIX QUAD/FLULAVAL QUAD) injection 0.5 mL  0.5 mL IntraMUSCular PRIOR TO DISCHARGE  ondansetron (ZOFRAN ODT) tablet 4 mg  4 mg Oral Q6H PRN  
 famotidine (PEPCID) tablet 20 mg  20 mg Oral BID  therapeutic multivitamin (THERAGRAN) tablet 1 Tab  1 Tab Oral DAILY  guaiFENesin ER (MUCINEX) tablet 600 mg  600 mg Oral Q12H Allergies: Cat dander and Pollen extracts Temp (24hrs), Av.2 °F (36.2 °C), Min:96.6 °F (35.9 °C), Max:97.6 °F (36.4 °C) Visit Vitals  /78 (BP 1 Location: Right arm, BP Patient Position: At rest)  Pulse (!) 101  Temp 97.6 °F (36.4 °C)  Resp 18  Ht 5' 11\" (1.803 m)  Wt 49.2 kg (108 lb 6.4 oz)  SpO2 95%  BMI 15.12 kg/m2 ROS: 12 point ROS obtained in details. Pertinent positives as mentioned in HPI,  
otherwise negative Physical Exam: 
 
 
Constitutional: He is oriented to person, place, and time. He appears well-nourished. Frail, elderly HENT:  
Head: Normocephalic and atraumatic. Eyes: EOM are normal. Pupils are equal, round, and reactive to light. Neck: Normal range of motion. Neck supple. Cardiovascular: Normal rate, regular rhythm and normal heart sounds. Exam reveals no friction rub. No murmur heard. Pulmonary/Chest: Effort normal. No respiratory distress. Bilateral LL rales, occasional wheezes Abdominal: Soft. He exhibits no distension. There is no tenderness. There is no rebound and no guarding. Musculoskeletal: Normal range of motion. Neurological: He is alert and oriented to person, place, and time. Skin: Skin is warm and dry. Psychiatric: He has a normal mood and affect. His behavior is normal. Thought content normal.  
  
 
Labs: Results:  
Chemistry Recent Labs  
   09/30/18 
 2849 GLU  80 NA  137  
K  3.8 CL  104 CO2  24 BUN  12  
CREA  0.59* CA  7.2* AGAP  9  
BUCR  20 CBC w/Diff Recent Labs  
   09/30/18 
 0737 WBC  7.4  
RBC  3.98* HGB  13.0 HCT  38.1 PLT  168 GRANS  81* LYMPH  7*  
EOS  3 Microbiology No results for input(s): CULT in the last 72 hours. RADIOLOGY: 
 
All available imaging studies/reports in [facility] for this admission were reviewed Dr. Tracee You, Infectious Disease Specialist 
[phone] October 2, 2018 
1:31 PM

## 2018-10-02 NOTE — PROGRESS NOTES
Hospitalist Progress Note Patient: Marti Whitaker MRN: [id number]  CSN: [id number] YOB: 1945  Age: 68 y.o. Sex: male DOA: 9/26/2018 LOS:  LOS: 6 days Pt has no complaints. Assessment/Plan 1. Hypoxia 82% on RA in ED. 
2. B.l pna superimposed on underlying bronchiectasis, chronic lung disease. unasyn per ID. 3. Severe sepsis poa (tachycardia, hypoxia) with evidence of end organ damage (lactic acidosis, elevated trop) - source m/l lungs 4. Lactic acidosis - resolved. 5. Elevated trop - flat, not c/w ACS. Echo noted. 6. Bronchitis, bronchiolitis with severe cystic bronchiectasis worse in the bilateral lower lobes with bilateral lung base pneumonia and bilateral lower 
lobe cysts with air-fluid levels. pulm consulted today. 7. Falls at home, ambulatory dysfunction. 8. pelvic ring injury - non operative treatment per ortho. PT: Toe Touch Weight bearing RLE per ortho. lovenox x 4 weeks for dvt prophylaxis 9. Echo 9/28/19 EF 60%, LV moderate concentric hypertrophy, no RWMA, grade 1 DD. Mild TR. Mild - mod pulmonary hypertension. Mild AR. 10. 2/4 blood culture + staph hemolyticus likely contaminant - repeat blood cx (9/28) ngtd - ID input appreciated 11. Difficulty caring for himself at home. Has resided w/ his Mom for 67 years, mom recently went to [facility] re[facility], family interested in them having shared room (per CM [facility] can accomodate). 12. mod pharyngeal dysphagia c/b silent penetration to laryngeal vestibule with thin liquid - s/p MBS - SLP recs noted. will require ST services upon DC from this facility. 13. Underweight Body mass index is 15.12 kg/(m^2). Nutritionist consult. multivit 14. Hypophosphatemia replete as needed. 15. DNR. No durable DNR on file - family to discuss. brother Mimi Foster [phone] Additional Notes:   
 
Case discussed with:  [x]Patient  [x]Family  [x]Nursing  []Case Management DVT Prophylaxis:  []Lovenox  [x]Hep SQ  []SCDs  []Coumadin   []On Heparin gtt Vital signs/Intake and Output: 
Visit Vitals  /79 (BP 1 Location: Right arm, BP Patient Position: At rest)  Pulse 99  Temp 96.8 °F (36 °C)  Resp 18  Ht 5' 11\" (1.803 m)  Wt 49.2 kg (108 lb 6.4 oz)  SpO2 95%  BMI 15.12 kg/m2 Current Shift:  10/02 0701 - 10/02 1900 In: -  
Out: 60 [Urine:60] Last three shifts:  09/30 1901 - 10/02 0700 In: 320 [P.O.:120; I.V.:200] Out: 775 [Urine:775] Thin, generally weak, awake and alert. Ncat. Perrl. Poor dentition, poor oral hygiene RRR Rhonchi to both bases, symmetrical chest expansion. abd soft nt nd nabs No edema. dp 2+ b.l No focal deficit. Generalized weakness No rash Medications Reviewed Labs: Results:  
   
Chemistry Recent Labs  
   09/30/18 
 5127 GLU  80 NA  137  
K  3.8 CL  104 CO2  24 BUN  12  
CREA  0.59* CA  7.2* AGAP  9  
BUCR  20 CBC w/Diff Recent Labs  
   09/30/18 
 0737 WBC  7.4  
RBC  3.98* HGB  13.0 HCT  38.1 PLT  168 GRANS  81* LYMPH  7*  
EOS  3 Cardiac Enzymes No results for input(s): CPK, CKND1, SUMMER in the last 72 hours. No lab exists for component: Kelly [place] Coagulation No results for input(s): PTP, INR, APTT in the last 72 hours. No lab exists for component: INREXT, INREXT Lipid Panel Lab Results Component Value Date/Time Cholesterol, total 112 04/04/2011 02:17 AM  
 HDL Cholesterol <10 (L) 04/04/2011 02:17 AM  
 LDL, calculated NOT CALCULATED DUE TO LOW HDLC LEVEL 04/04/2011 02:17 AM  
 VLDL, calculated  04/04/2011 02:17 AM  
  Calculation not valid with this patient's other Lipid values. Triglyceride 327 (H) 04/04/2011 02:17 AM  
 CHOL/HDL Ratio NOT CALCULATED DUE TO LOW HDLC LEVEL 04/04/2011 02:17 AM  
  
BNP No results for input(s): BNPP in the last 72 hours. Liver Enzymes No results for input(s): TP, ALB, TBIL, AP, SGOT, GPT in the last 72 hours. No lab exists for component: DBIL Thyroid Studies Lab Results Component Value Date/Time TSH 0.69 09/28/2018 05:47 AM  
    
Procedures/imaging: see electronic medical records for all procedures/Xrays and details which were not copied into this note but were reviewed prior to creation of Plan.

## 2018-10-03 LAB
EOSINOPHIL SPT QL WRIGHT STN: NORMAL
RHEUMATOID FACT SER QL LA: NEGATIVE

## 2018-10-03 PROCEDURE — 94640 AIRWAY INHALATION TREATMENT: CPT

## 2018-10-03 PROCEDURE — 74011000250 HC RX REV CODE- 250: Performed by: INTERNAL MEDICINE

## 2018-10-03 PROCEDURE — 77030020186 HC BOOT HL PROTCT SAGE -B

## 2018-10-03 PROCEDURE — 74011250637 HC RX REV CODE- 250/637: Performed by: HOSPITALIST

## 2018-10-03 PROCEDURE — 77030037878 HC DRSG MEPILEX >48IN BORD MOLN -B

## 2018-10-03 PROCEDURE — 74011250637 HC RX REV CODE- 250/637: Performed by: INTERNAL MEDICINE

## 2018-10-03 PROCEDURE — 82103 ALPHA-1-ANTITRYPSIN TOTAL: CPT | Performed by: INTERNAL MEDICINE

## 2018-10-03 PROCEDURE — 87070 CULTURE OTHR SPECIMN AEROBIC: CPT | Performed by: INTERNAL MEDICINE

## 2018-10-03 PROCEDURE — 87205 SMEAR GRAM STAIN: CPT | Performed by: INTERNAL MEDICINE

## 2018-10-03 PROCEDURE — 36415 COLL VENOUS BLD VENIPUNCTURE: CPT | Performed by: INTERNAL MEDICINE

## 2018-10-03 PROCEDURE — 92526 ORAL FUNCTION THERAPY: CPT

## 2018-10-03 PROCEDURE — 77030011256 HC DRSG MEPILEX <16IN NO BORD MOLN -A

## 2018-10-03 PROCEDURE — 74011000258 HC RX REV CODE- 258: Performed by: INTERNAL MEDICINE

## 2018-10-03 PROCEDURE — 74011250636 HC RX REV CODE- 250/636: Performed by: HOSPITALIST

## 2018-10-03 PROCEDURE — 65660000000 HC RM CCU STEPDOWN

## 2018-10-03 PROCEDURE — 74011250636 HC RX REV CODE- 250/636: Performed by: INTERNAL MEDICINE

## 2018-10-03 RX ORDER — SODIUM CHLORIDE FOR INHALATION 10 %
5 VIAL, NEBULIZER (ML) INHALATION
Status: COMPLETED | OUTPATIENT
Start: 2018-10-03 | End: 2018-10-05

## 2018-10-03 RX ORDER — MENTHOL AND ZINC OXIDE .44; 20.625 G/100G; G/100G
OINTMENT TOPICAL 3 TIMES DAILY
Status: DISCONTINUED | OUTPATIENT
Start: 2018-10-03 | End: 2018-10-04

## 2018-10-03 RX ADMIN — AMPICILLIN SODIUM AND SULBACTAM SODIUM 3 G: 2; 1 INJECTION, POWDER, FOR SOLUTION INTRAMUSCULAR; INTRAVENOUS at 08:07

## 2018-10-03 RX ADMIN — AMPICILLIN SODIUM AND SULBACTAM SODIUM 3 G: 2; 1 INJECTION, POWDER, FOR SOLUTION INTRAMUSCULAR; INTRAVENOUS at 18:00

## 2018-10-03 RX ADMIN — AMPICILLIN SODIUM AND SULBACTAM SODIUM 3 G: 2; 1 INJECTION, POWDER, FOR SOLUTION INTRAMUSCULAR; INTRAVENOUS at 11:47

## 2018-10-03 RX ADMIN — GUAIFENESIN 600 MG: 600 TABLET, EXTENDED RELEASE ORAL at 09:25

## 2018-10-03 RX ADMIN — AMPICILLIN SODIUM AND SULBACTAM SODIUM 3 G: 2; 1 INJECTION, POWDER, FOR SOLUTION INTRAMUSCULAR; INTRAVENOUS at 01:44

## 2018-10-03 RX ADMIN — CHOLESTYRAMINE 4 G: 4 POWDER, FOR SUSPENSION ORAL at 09:25

## 2018-10-03 RX ADMIN — CHOLESTYRAMINE 4 G: 4 POWDER, FOR SUSPENSION ORAL at 11:48

## 2018-10-03 RX ADMIN — FAMOTIDINE 20 MG: 20 TABLET ORAL at 09:25

## 2018-10-03 RX ADMIN — IPRATROPIUM BROMIDE AND ALBUTEROL SULFATE 3 ML: .5; 3 SOLUTION RESPIRATORY (INHALATION) at 21:22

## 2018-10-03 RX ADMIN — IPRATROPIUM BROMIDE AND ALBUTEROL SULFATE 3 ML: .5; 3 SOLUTION RESPIRATORY (INHALATION) at 07:11

## 2018-10-03 RX ADMIN — Medication 1 CAPSULE: at 09:25

## 2018-10-03 RX ADMIN — CHOLESTYRAMINE 4 G: 4 POWDER, FOR SUSPENSION ORAL at 17:00

## 2018-10-03 RX ADMIN — Medication 0.44 EACH: at 22:38

## 2018-10-03 RX ADMIN — THERA TABS 1 TABLET: TAB at 09:25

## 2018-10-03 RX ADMIN — FAMOTIDINE 20 MG: 20 TABLET ORAL at 18:01

## 2018-10-03 RX ADMIN — IPRATROPIUM BROMIDE AND ALBUTEROL SULFATE 3 ML: .5; 3 SOLUTION RESPIRATORY (INHALATION) at 11:23

## 2018-10-03 RX ADMIN — Medication: at 17:00

## 2018-10-03 RX ADMIN — GUAIFENESIN 600 MG: 600 TABLET, EXTENDED RELEASE ORAL at 20:31

## 2018-10-03 RX ADMIN — Medication 5 ML: at 21:22

## 2018-10-03 RX ADMIN — Medication 5 ML: at 11:23

## 2018-10-03 NOTE — PROGRESS NOTES
[facility] Pulmonary, Critical Care, and Sleep Medicine Progress note Name: Adam Carver MRN: [id number] : 1945 Hospital: [address] [place] [address] Date: 10/3/2018 IMPRESSION:  
· Chronic bronchiectasis and cystic lung disease with superinfection causing air fluid levels and micro- macroabscess formation and suppurative lower respiratory infection. Chronicity unclear but suspect long standing process with decompensation. 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 ? Common variable immune deficiency CVID. Currently being treated with antibiotics to cover Aspiration and CAP pathogens- agree. In addition at risk for pseudomonas infection and also concern for atypical mycobacterial infection. · Bronchitis, bronchiolitis with severe cystic bronchiectasis worse in the bilateral lower lobes with bilateral lung base pneumonia and bilateral lower lobe cysts with air-fluid levels · Hypoxic respiratory insufficiency- needing 2-3 L supplemental O2 at rest 
· Pulmonary HTN secondary to above- group 3 · Dysphagia- aspiration risk · Low BMI · S/P fall- pelvic ring injury RECOMMENDATIONS:  
· Oxygen- titrate to SaO2 goal >92% · Bronchial hygiene protocol- will add hypertonic saline, VEST percussion and postural drainage to facilitate clearing mucus. Should be discharged with VEST · Bronchodilators added to facilitate mucociliary clearance · Steroids- not indicated · Antibiotics- agree with current coverage. Will check additional cultures · Checking IgG subclass, sputum eosinophils to guide further therapy · Aspiration precautions · Need for further diagnostics- follow up CT scan in 12 weeks. · No indication for bronchoscopy · Out patient testing- PFT, 6 min walk, Polysomnogram 
· Assess home Oxygen needs at discharge · OT, PT, OOB and ambulate · Healthy weight, nutrition · DNR · Will Follow · DVT, PUD prophylaxis Subjective: This patient has been seen and evaluated at the request of Dr. Magdaleno Ching for bilateral pneumonia and lung cysts with bronchiectasis. 10/03/18 Feels better Coughing- still not expectorating much SaO2 on room air - 88% Feels less wheezing with bronchodilators HPI: 
Patient is a 68 y.o. male Margoth Mullen is a 68 y.o. male who had been living with his mother, who was very recently placed in [address] [place] [address]. The patient presents to the ER stating he has been coughing and wheezing. He adds that he has fallen several times at home and has been \"stuck in a chair\" unable to get up due to pain in left leg after his last fall onto his legs. Reportedly family checked on him to find the patient siting in excrement in chair and called medics. In the ER the patient was hypoxic 82% pulse ox on room air and tachycardic and has required O2 4l nc now titrated to 2l for pulse ox 93%. C/o cough with difficulty expectorating mucus. Denies any SOB. Denies chest pain. Denies nausea, vomiting, fever/ chills Admits to sinus congestion and drainage Worked in a [employer]. 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 Non smoker but significant second hand smoke exposure Denies ever being diagnosed with Pulmonary problems. Prior to Admission medications Not on File Allergies Allergen Reactions  Cat Dander Sneezing  Pollen Extracts Runny Nose Current Facility-Administered Medications Medication Dose Route Frequency  albuterol-ipratropium (DUO-NEB) 2.5 MG-0.5 MG/3 ML  3 mL Nebulization QID RT  
 cholestyramine-aspartame (QUESTRAN LIGHT) packet 4 g  4 g Oral TID WITH MEALS  lactobacillus sp. 50 billion cpu (BIO-K PLUS) capsule 1 Cap  1 Cap Oral DAILY  ampicillin-sulbactam (UNASYN) 3 g in 0.9% sodium chloride (MBP/ADV) 100 mL MBP  3 g IntraVENous Q6H  
 enoxaparin (LOVENOX) injection 40 mg  40 mg SubCUTAneous Q24H  influenza vaccine 2018-19 (6 mos+)(PF) (FLUARIX QUAD/FLULAVAL QUAD) injection 0.5 mL  0.5 mL IntraMUSCular PRIOR TO DISCHARGE  famotidine (PEPCID) tablet 20 mg  20 mg Oral BID  therapeutic multivitamin (THERAGRAN) tablet 1 Tab  1 Tab Oral DAILY  guaiFENesin ER (MUCINEX) tablet 600 mg  600 mg Oral Q12H Review of Systems: A comprehensive review of systems was negative except for that written in the HPI. Objective:  
Vital Signs:   
Visit Vitals  /72 (BP 1 Location: Right arm, BP Patient Position: At rest)  Pulse (!) 106  Temp 97.2 °F (36.2 °C)  Resp 16  
 Ht 5' 11\" (1.803 m)  Wt 50 kg (110 lb 3.2 oz)  SpO2 92%  BMI 15.37 kg/m2 O2 Device: Nasal cannula O2 Flow Rate (L/min): 2 l/min Temp (24hrs), Av.3 °F (36.3 °C), Min:96.8 °F (36 °C), Max:97.7 °F (36.5 °C) Intake/Output:  
Last shift:        
Last 3 shifts: 10/01 1901 - 10/03 0700 In: 320 [P.O.:120; I.V.:200] Out: 335 [Urine:335] Intake/Output Summary (Last 24 hours) at 10/03/18 0813 Last data filed at 10/02/18 1905 Gross per 24 hour Intake                0 ml Output              160 ml Net             -160 ml Physical Exam:  
General:  Alert, cooperative, no distress, appears stated age. Head:  Normocephalic, without obvious abnormality, atraumatic. Eyes:  Conjunctivae/corneas clear. PERRL, EOMs intact. Nose: Nares normal. Septum midline. Mucosa normal. Minimal drainage no sinus tenderness. Throat: Lips, mucosa, and tongue normal. Teeth and gums normal.  
Neck: Supple, symmetrical, trachea midline, no adenopathy, thyroid: no enlargment/tenderness/nodules, no carotid bruit and no JVD. Back:   Symmetric, no curvature. ROM normal.  
Lungs:   Bilateral diffuse ronchi, rales Chest wall:  No tenderness or deformity. Heart:  Regular rate and rhythm, S1, S2 normal, no murmur, click, rub or gallop. Abdomen:   Soft, non-tender.  Bowel sounds normal. No masses,  No organomegaly. Extremities: Extremities normal, atraumatic, no cyanosis or edema. Pulses: 2+ and symmetric all extremities. Skin: Skin dry, scaly and ecchymotic Lymph nodes: Cervical, supraclavicular, and axillary nodes normal.  
Neurologic: Grossly nonfocal  
 
Data review: No results found for this or any previous visit (from the past 24 hour(s)). Imaging: 
I have personally reviewed the patients radiographs and have reviewed the reports: XR Results (most recent): 
 
Results from Hospital Encounter encounter on 09/26/18 XR SWALLOW [facility] VIDEO Narrative EXAM:  Modified Barium Swallow (Video Barium Swallow). CPT  83211 CLINICAL INDICATION:  
 
- Bronchitis, family with reports of coughing with PO, pt with reports of 
difficulty swallowing.   
- Bedside exam with mild pharyngeal dysphagia. Weak laryngeal elevation to 
palpation with all tested bedside boluses. Full MBS felt to be needed by the 
bedside exam to rule out occult silent aspiration. COMPARISON:  None. TECHNIQUE:   
 
- This study was performed in conjunction with speech pathologist with the 
patient in lateral upright position.   
- Tested consistencies: Thin (via cup/straw), nectar (via cup), puree, solid, 
and tablet challenge utilizing nectar consistency wash. - Fluoroscopy duration:  1 minute 42 seconds. - Exposures:  7 cinefluoroscopy loops. FINDINGS: 
 
There was moderate penetration on thin consistency boluses through both the cup 
and straw. No definite penetration or aspiration was noted on other tested 
boluses. Vallecular and piriform sinus residuals were observed with all tested 
consistencies. Impression IMPRESSION: 
 
1. Moderate penetration on thin liquids with both cup and straw. No collin 
aspiration or penetration on other tested consistencies. 2.  Vallecular and piriform sinus residuals with all tested boluses.  
 
 
Thank you for your referral.   
 
   
 
 
CT Results (most recent): 
 
 Results from [facility] Encounter encounter on 09/26/18 CT PELV WO CONT Narrative CT pelvis without IV contrast 
 
HISTORY: Patient fell. Pain. Reference: Radiographs September 27, 2018 All CT scans at this facility are performed using dose optimization technique as 
appropriate to a performed exam, to include automated exposure control, 
adjustment of the mA and/or kV according to patient size (including appropriate 
matching for site specific examination) or use of iterative reconstruction 
technique. Axial CT images were obtained. Sagittal and coronal images were reformatted. Osteoporosis. Slightly displaced oblique fracture in the right ilium, from the mid iliac crest 
extending to slightly above the acetabular roof. Suspect mild fracture at the 
lateral margin of the sacrum, at the sacroiliac joint on the right and also on 
the left. Subacute or old fracture in the right inferior pubic ramus. Pubic symphysis is 
intact. No definite displaced fracture in the left pubic rami. Advanced degenerative joint disease in both hips with prominent osteophytes. No 
acute femoral neck fracture or hip dislocation. No hip joint effusion. No internal pelvic hematoma. Mild residual high density within the bladder, 
likely from IV contrast received on September 26, 2018. Prostate is slightly 
enlarged with central calcification. Moderately severe fecal retention in the 
distended rectosigmoid. No pelvic inflammation. Normal appendix. No free fluid. Impression IMPRESSION: 
 
1. Acute, slightly displaced right iliac fracture. Also suspect subtle acute 
fracture in the sacral side of the sacroiliac joints bilaterally. 2. Subacute or old right inferior pubic ramus fracture. 3. Additional findings as described. 09/26/18 ECHO ADULT COMPLETE 09/28/2018 9/28/2018 Narrative · Calculated left ventricular ejection fraction is 60%. Visually measured ejection fraction. Left ventricular cavity size is dilated. Left  
ventricular moderate concentric hypertrophy. Normal left ventricular wall  
motion, no regional wall motion abnormality noted. Mild (grade 1) left  
ventricular diastolic dysfunction. · Mild tricuspid valve regurgitation is present. Pulmonary arterial  
systolic pressure is 48 mmHg. Mild to moderate pulmonary hypertension is  
present. · Mitral valve non-specific thickening. Mitral annular calcification. · Mild aortic valve regurgitation is present. Signed by: Chip Martins MD  
 
 
High complexity decision making was performed during the evaluation of this patient at high risk for decompensation with multiple organ involvement 
  
Above mentioned total time spent on reviewing the case/medical record/data/notes/EMR/patient examination/documentation/coordinating care with nurse/consultants, exclusive of procedures with complex decision making performed and > 50% time spent in face to face evaluation.  
 
 
 
  
Jovita Hurt MD

## 2018-10-03 NOTE — ROUTINE PROCESS
Bedside and Verbal shift change report given to [facility] RN and Luiz Siegel RN (oncoming nurse) by Cinthya Kumar RN and Darrell RN (offgoing nurse). Report included the following information SBAR, Kardex, Intake/Output, MAR and Recent Results. Patient quietly in bed.

## 2018-10-03 NOTE — ROUTINE PROCESS
Bedside and Verbal shift change report given to Zander RN (oncoming nurse) by Burton Valencia RN (offgoing nurse). Report included the following information SBAR, Kardex, Intake/Output and Med Rec Status. Resting in bed watching TV.

## 2018-10-03 NOTE — PROGRESS NOTES
Infectious Disease progress Note Requested by: dr. Abdirizak Almaguer Reason: gram positive bacteremia, pneumonia Current abx Prior abx Amp/sulbactam since 9/28 Levofloxacin 9/26-9/27 
azithromycin 9/27- 9/30 
ceftriaxone 9/27-9/28 Lines:  
 
 
Assessment : 
68 y.o. male with no known significant past medical history presented to ed on 9/26/18 with generalized weakness, inability to get out of his chair. CT chest: Bronchitis, bronchiolitis with severe cystic bronchiectasis worse in the bilateral lower lobes with bilateral lung base pneumonia and bilateral lower lobe cysts with air-fluid levels. Clinical picture consistent with bilateral pneumonia superimposed on underlying bronchiectasis, chronic lung disease. Exact microbial etiology of infection unclear. 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 Patient at risk for pseudomonas infection. Unable to get sputum cx. Induced sputum cx ordered today. Current clinical improvement on amp/sulbactam argues against pseudomonas, mycobacterial infection. Hence, will continue current abx while awaiting sputum cx. Swallow evaluation results indicated risk for aspiration. Single positive blood culture for staph species 9/26 likely contaminant. Recommendations: 1. cont amp/sulbactam 
2. F/u Pulmonary recommendations 3. Await induced sputum cultures 4. F/u cbc, clinically Advance Care planning: DNR: discussed  with patient/surrogate decision maker:Josseline Del Valle: [phone] Above plan was discussed in details with patient, and dr Tara Teixeira. Please call me if any further questions or concerns. Will continue to participate in the care of this patient. subjective: 
 
Feels better. Denies chest pain, increased cough, chills, fever. Patient denies headaches, visual disturbances, sore throat,earaches,  abdominal pain, diarrhea, burning micturition, pain or weakness in extremities. He denies back pain/flank pain. There are no discharge medications for this patient. Current Facility-Administered Medications Medication Dose Route Frequency  albuterol-ipratropium (DUO-NEB) 2.5 MG-0.5 MG/3 ML  3 mL Nebulization QID RT  
 cholestyramine-aspartame (QUESTRAN LIGHT) packet 4 g  4 g Oral TID WITH MEALS  
 acetaminophen (TYLENOL) tablet 500 mg  500 mg Oral Q6H PRN  
 lactobacillus sp. 50 billion cpu (BIO-K PLUS) capsule 1 Cap  1 Cap Oral DAILY  ampicillin-sulbactam (UNASYN) 3 g in 0.9% sodium chloride (MBP/ADV) 100 mL MBP  3 g IntraVENous Q6H  
 enoxaparin (LOVENOX) injection 40 mg  40 mg SubCUTAneous Q24H  
 influenza vaccine 2018- (6 mos+)(PF) (FLUARIX QUAD/FLULAVAL QUAD) injection 0.5 mL  0.5 mL IntraMUSCular PRIOR TO DISCHARGE  ondansetron (ZOFRAN ODT) tablet 4 mg  4 mg Oral Q6H PRN  
 famotidine (PEPCID) tablet 20 mg  20 mg Oral BID  therapeutic multivitamin (THERAGRAN) tablet 1 Tab  1 Tab Oral DAILY  guaiFENesin ER (MUCINEX) tablet 600 mg  600 mg Oral Q12H Allergies: Cat dander and Pollen extracts Temp (24hrs), Av.3 °F (36.3 °C), Min:96.8 °F (36 °C), Max:97.7 °F (36.5 °C) Visit Vitals  /72 (BP 1 Location: Right arm, BP Patient Position: At rest)  Pulse (!) 106  Temp 97.2 °F (36.2 °C)  Resp 16  
 Ht 5' 11\" (1.803 m)  Wt 50 kg (110 lb 3.2 oz)  SpO2 92%  BMI 15.37 kg/m2 ROS: 12 point ROS obtained in details. Pertinent positives as mentioned in HPI,  
otherwise negative Physical Exam: 
 
 
Constitutional: He is oriented to person, place, and time. He appears well-nourished. Frail, elderly HENT:  
Head: Normocephalic and atraumatic. Eyes: EOM are normal. Pupils are equal, round, and reactive to light. Neck: Normal range of motion. Neck supple. Cardiovascular: Normal rate, regular rhythm and normal heart sounds. Exam reveals no friction rub. No murmur heard. Pulmonary/Chest: Effort normal. No respiratory distress.  Bilateral LL rales, occasional wheezes Abdominal: Soft. He exhibits no distension. There is no tenderness. There is no rebound and no guarding. Musculoskeletal: Normal range of motion. Neurological: He is alert and oriented to person, place, and time. Skin: Skin is warm and dry. Psychiatric: He has a normal mood and affect. His behavior is normal. Thought content normal.  
  
 
Labs: Results:  
Chemistry No results for input(s): GLU, NA, K, CL, CO2, BUN, CREA, CA, AGAP, BUCR, TBIL, GPT, AP, TP, ALB, GLOB, AGRAT in the last 72 hours. CBC w/Diff No results for input(s): WBC, RBC, HGB, HCT, PLT, GRANS, LYMPH, EOS, HGBEXT, HCTEXT, PLTEXT, HGBEXT, HCTEXT, PLTEXT in the last 72 hours. Microbiology No results for input(s): CULT in the last 72 hours. RADIOLOGY: 
 
All available imaging studies/reports in [facility] for this admission were reviewed Dr. oJsé Monreal, Infectious Disease Specialist 
[phone] October 3, 2018 
1:31 PM

## 2018-10-03 NOTE — ROUTINE PROCESS
Bedside shift change report given to Zander (oncoming nurse) by Blanka Rodriguez (offgoing nurse). Report included the following information SBAR, Kardex and MAR.

## 2018-10-03 NOTE — WOUND CARE
Physical Exam  
Room 216: wound assessment Wound Sacral Proximal (Active) moisture associated skin damage rash DRESSING STATUS Intact 10/3/2018  3:11 PM  
DRESSING TYPE Silicone 29/7/9865  2:63 PM  
Non-Pressure Injury Partial thickness (epider/derm) 10/3/2018  3:11 PM  
Condition of Base Bright red 10/3/2018  3:11 PM  
Condition of Edges Closed 10/3/2018  3:11 PM  
Epithelialization (%) 0 10/3/2018  3:11 PM  
Tissue Type Red 10/3/2018  3:11 PM  
Tissue Type Percent Red 100 10/3/2018  3:11 PM  
Drainage Amount  Scant 10/3/2018  3:11 PM  
Drainage Color Clear 10/3/2018  3:11 PM  
Wound Odor None 10/3/2018  3:11 PM  
Periwound Skin Condition Intact 10/3/2018  3:11 PM  
Dressing Type Applied Silicone 12/1/0022  7:71 PM  
Procedure Tolerated Well 10/3/2018  3:11 PM  
Number of days:1 Wound Coccyx Distal (Active) linear friction injury DRESSING STATUS Intact 10/3/2018  3:11 PM  
DRESSING TYPE Silicone 27/2/8715  0:64 PM  
Non-Pressure Injury Partial thickness (epider/derm) 10/3/2018  3:11 PM  
Wound Length (cm) 2 cm 10/3/2018  3:11 PM  
Wound Width (cm) 0.5 cm 10/3/2018  3:11 PM  
Wound Depth (cm) 0.1 10/3/2018  3:11 PM  
Wound Surface area (cm^2) 1 cm^2 10/3/2018  3:11 PM  
Condition of Base Vesper 10/3/2018  3:11 PM  
Condition of Edges Closed 10/3/2018  3:11 PM  
Epithelialization (%) 0 10/3/2018  3:11 PM  
Tissue Type Pink 10/3/2018  3:11 PM  
Tissue Type Percent Pink 100 10/3/2018  3:11 PM  
Drainage Amount  Scant 10/3/2018  3:11 PM  
Drainage Color Clear 10/3/2018  3:11 PM  
Wound Odor None 10/3/2018  3:11 PM  
Periwound Skin Condition Erythema, blanchable 10/3/2018  3:11 PM  
Dressing Type Applied Silicone 37/4/3115  4:20 PM  
Procedure Tolerated Well 10/3/2018  3:11 PM  
Number of days:0 Staff have ordered a specialty bed for moisture management. Pt has condom urinary catheter in place. Will order Calmoseptine ointment for bright red rash. Heels are red, blanchable intact, will ask primary nurse to place prevalon boots bilaterally. Pt turns well side to side with some assistance. RD has evaluated pt. Pt agreeable to recommended treatment. Wound care education provided to pt at this time & to primary nurse [facility] RN. Will turn over care to nursing staff at this time.  
Yossi MARTINES, RN, [facility], [address]

## 2018-10-03 NOTE — PROGRESS NOTES
Problem: Dysphagia (Adult) Goal: *Acute Goals and Plan of Care (Insert Text) Patient will:- goals met 10/3/18 1. Tolerate PO trials with 0 s/s overt distress in 4/5 trials 2. Utilize compensatory swallow strategies/maneuvers (decrease bite/sip, size/rate, alt. liq/sol) with min cues in 4/5 trials 3. Perform oral-motor/laryngeal exercises to increase oropharyngeal swallow function with min cues 4. Complete an objective swallow study (i.e., MBSS) to assess swallow integrity, r/o aspiration, and determine of safest LRD, min A - met 9/28/18 Rec:    
Reg solid with nectar-thick liquids Aspiration precautions HOB >45 during po intake, remain >30 for 30-45 minutes after po Small bites/sips; alternate liquid/solid with slow feeding rate Oral care TID Meds per pt preference SLP services when DC from this facility Outcome: Resolved/Met Date Met: 10/03/18 Speech language pathology dysphagia treatment & discharge Patient: Cisco Zapata (89 y.o. male) Date: 10/3/2018 Diagnosis: Acute bronchitis with COPD ([facility] [address] SOB (shortness of breath) Hypoxia Community acquired pneumonia Precautions:  Fall, Aspiration, Skin ASSESSMENT: 
Pt seen b/s for dysphagia tx. Pt awake, alert, able to verbally recall compensatory swallow strategies (small bites/ sips, 2 swallows) with min verba cues. Pt accepted straw sips nectar and mechanical soft solids with independent small sips/ bites, min verbal cues to utilize 2 swallows. Pt without overt/ soft/ silent s/sx aspiration b/s. Pt unable to follow multi-step commands for swallowing exercises. Pt currently able to tolerate highest diet level appropriate without overt s/sx aspiration. SLP will sign off at current and be avail in the future of needed. Thank you. Progression toward goals: 
[x]         Improving appropriately - goals met/approximated 
[]         Not making progress/Not appropriate - will d/c POC PLAN: 
 Recommendations and Planned Interventions: 
Maximum therapeutic gains met; safest, least restrictive diet achieved. D/C ST intervention at this time. Discharge Recommendations:  None SUBJECTIVE:  
Patient stated I like my water. OBJECTIVE:  
Cognitive and Communication Status: 
Neurologic State: Alert Orientation Level: Oriented X4 Cognition: Follows commands Perception: Appears intact Perseveration: No perseveration noted Safety/Judgement: Fall prevention Dysphagia Treatment: 
Oral Assessment: 
Oral Assessment Labial: No impairment Dentition: Natural 
Oral Hygiene: fair Lingual: No impairment Velum: No impairment Mandible: No impairment P.O. Trials: 
 Patient Position: [facility] Vocal quality prior to P.O.: No impairment Consistency Presented: Mechanical soft, Nectar thick liquid How Presented: Self-fed/presented, Straw Bolus Acceptance: No impairment Bolus Formation/Control: No impairment Type of Impairment: Mastication Propulsion: No impairment Oral Residue: None Initiation of Swallow: Delayed (# of seconds) Laryngeal Elevation: Weak Aspiration Signs/Symptoms: None Pharyngeal Phase Characteristics: No impairment, issues, or problems Effective Modifications: Double swallow, Small sips and bites Cues for Modifications: Minimal 
   
 
 Oral Phase Severity: No impairment Pharyngeal Phase Severity : Mild Oral Motor Exercises: 
   
   
   
   
   
   
   
   
   
   
   
   
   
   
   
   
   
   
   
   
   
   
   
   
   
   
   
   
   
   
   
   
   
   
   
   
   
   
   
   
   
   
   
   
   
   
   
   
   
   
   
   
   
   
  
Exercises: 
Laryngeal Exercises: PAIN: 
Start of Tx: 0 End of Tx: 0 After treatment:  
[]              Patient left in no apparent distress sitting up in chair [x]              Patient left in no apparent distress in bed 
[x]              Call bell left within reach [x]              Nursing notified 
[]              Caregiver present 
[]              Bed alarm activated COMMUNICATION/EDUCATION:  
[x] Aspiration precautions; swallow safety; compensatory techniques []        Patient unable to participate in education; education ongoing with staff [x]  Posted safety precautions in patient's room. [] Oral-motor/laryngeal strengthening exercises Dennis Martínez MS, CCC/SLP Time Calculation: 14 mins

## 2018-10-03 NOTE — PROGRESS NOTES
Hospitalist Progress Note Patient: Kylee Goodwin MRN: [id number]  CSN: [id number] YOB: 1945  Age: 68 y.o. Sex: male DOA: 9/26/2018 LOS:  LOS: 7 days Patient states he feels better, cough improving. Appetite okay. Induced sputum cx ordered. Hypertonic nebs and VEST ordered per pccm. Assessment/Plan 1. Hypoxia 82% on RA in ED. 
2. B.l pna superimposed on underlying bronchiectasis, chronic lung disease. unasyn per ID. 3. Severe sepsis poa (tachycardia, hypoxia) with evidence of end organ damage (lactic acidosis, elevated trop) - source m/l lungs 4. Lactic acidosis - resolved. 5. Elevated trop - flat, not c/w ACS. Echo noted. 6. Bronchitis, bronchiolitis with severe cystic bronchiectasis worse in the bilateral lower lobes with bilateral lung base pneumonia and bilateral lower 
lobe cysts with air-fluid levels. pulm consulted today. 7. Falls at home, ambulatory dysfunction. 8. pelvic ring injury - non operative treatment per ortho. PT: Toe Touch Weight bearing RLE per ortho. lovenox x 4 weeks for dvt prophylaxis 9. Echo 9/28/19 EF 60%, LV moderate concentric hypertrophy, no RWMA, grade 1 DD. Mild TR. Mild - mod pulmonary hypertension. Mild AR. 10. 2/4 blood culture + staph hemolyticus likely contaminant - repeat blood cx (9/28) ngtd - ID input appreciated 11. Difficulty caring for himself at home. Has resided w/ his Mom for 67 years, mom recently went to [facility] rehab, family interested in them having shared room (per STEFANIA guodate). 12. mod pharyngeal dysphagia c/b silent penetration to laryngeal vestibule with thin liquid - s/p MBS - SLP recs noted. will require ST services upon DC from this facility. 13. Underweight Body mass index is 15.37 kg/(m^2). On supplements per nutritionist. multivit 14. Hypophosphatemia replete as needed. 15. DNR. No durable DNR on file - family to discuss. brother Kay Hinson [phone] Additional Notes:   
 
Case discussed with:  [x]Patient  []Family  [x]Nursing  []Case Management DVT Prophylaxis:  []Lovenox  [x]Hep SQ  []SCDs  []Coumadin   []On Heparin gtt Vital signs/Intake and Output: 
Visit Vitals  /75 (BP 1 Location: Right arm, BP Patient Position: At rest)  Pulse (!) 102  Temp 97 °F (36.1 °C)  Resp 18  Ht 5' 11\" (1.803 m)  Wt 50 kg (110 lb 3.2 oz)  SpO2 100%  BMI 15.37 kg/m2 Current Shift:    
Last three shifts:  10/01 1901 - 10/03 0700 In: 520 [P.O.:120; I.V.:400] Out: 387 [Urine:385] Thin, generally weak, awake and alert. Ncat. Perrl. Poor dentition, poor oral hygiene RRR Rhonchi to both bases, symmetrical chest expansion. abd soft nt nd nabs No edema. dp 2+ b.l No focal deficit. Generalized weakness No rash Medications Reviewed Labs: Results:  
   
Chemistry No results for input(s): GLU, NA, K, CL, CO2, BUN, CREA, CA, AGAP, BUCR, TBIL, GPT, AP, TP, ALB, GLOB, AGRAT in the last 72 hours. CBC w/Diff No results for input(s): WBC, RBC, HGB, HCT, PLT, GRANS, LYMPH, EOS, HGBEXT, HCTEXT, PLTEXT, HGBEXT, HCTEXT, PLTEXT in the last 72 hours. Cardiac Enzymes No results for input(s): CPK, CKND1, SUMMER in the last 72 hours. No lab exists for component: [facility] Coagulation No results for input(s): PTP, INR, APTT in the last 72 hours. No lab exists for component: INREXT, INREXT Lipid Panel Lab Results Component Value Date/Time Cholesterol, total 112 04/04/2011 02:17 AM  
 HDL Cholesterol <10 (L) 04/04/2011 02:17 AM  
 LDL, calculated NOT CALCULATED DUE TO LOW HDLC LEVEL 04/04/2011 02:17 AM  
 VLDL, calculated  04/04/2011 02:17 AM  
  Calculation not valid with this patient's other Lipid values. Triglyceride 327 (H) 04/04/2011 02:17 AM  
 CHOL/HDL Ratio NOT CALCULATED DUE TO LOW HDLC LEVEL 04/04/2011 02:17 AM  
  
BNP No results for input(s): BNPP in the last 72 hours. Liver Enzymes No results for input(s): TP, ALB, TBIL, AP, SGOT, GPT in the last 72 hours. No lab exists for component: DBIL Thyroid Studies Lab Results Component Value Date/Time TSH 0.69 09/28/2018 05:47 AM  
    
Procedures/imaging: see electronic medical records for all procedures/Xrays and details which were not copied into this note but were reviewed prior to creation of Plan.

## 2018-10-03 NOTE — ROUTINE PROCESS
Bedside shift change report given to Larry Arora RN (oncoming nurse) by Kita Larson (offgoing nurse). Report included the following information SBAR, Kardex, Procedure Summary, Intake/Output, MAR and Recent Results. Pt had no complaints of pain, no aparent signs of distress- fever, diaphoresis, shortness of breath. All other vital signs are stable

## 2018-10-03 NOTE — PROGRESS NOTES
Speech Pathology Note 1125: Attempted dysphagia tx with diet tolerance and trials thin liquids. Pt just started breathing tx with RT. SLP will f/u as schedule permits. Thank you. Swetha Dalton MS, CCC/SLP Speech- Language Pathologist

## 2018-10-04 LAB
A1AT SERPL-MCNC: 180 MG/DL (ref 90–200)
ANION GAP SERPL CALC-SCNC: 8 MMOL/L (ref 3–18)
BACTERIA SPEC CULT: NORMAL
BASOPHILS # BLD: 0 K/UL (ref 0–0.1)
BASOPHILS NFR BLD: 0 % (ref 0–2)
BUN SERPL-MCNC: 7 MG/DL (ref 7–18)
BUN/CREAT SERPL: 18 (ref 12–20)
CALCIUM SERPL-MCNC: 7 MG/DL (ref 8.5–10.1)
CHLORIDE SERPL-SCNC: 99 MMOL/L (ref 100–108)
CO2 SERPL-SCNC: 31 MMOL/L (ref 21–32)
CREAT SERPL-MCNC: 0.38 MG/DL (ref 0.6–1.3)
DIFFERENTIAL METHOD BLD: ABNORMAL
EOSINOPHIL # BLD: 0.2 K/UL (ref 0–0.4)
EOSINOPHIL NFR BLD: 3 % (ref 0–5)
ERYTHROCYTE [DISTWIDTH] IN BLOOD BY AUTOMATED COUNT: 13.2 % (ref 11.6–14.5)
GLUCOSE SERPL-MCNC: 73 MG/DL (ref 74–99)
HCT VFR BLD AUTO: 33.7 % (ref 36–48)
HGB BLD-MCNC: 11 G/DL (ref 13–16)
IGG SERPL-MCNC: 1573 MG/DL (ref 700–1600)
IGG1 SER-MCNC: 888 MG/DL (ref 248–810)
IGG2 SER-MCNC: 307 MG/DL (ref 130–555)
IGG3 SER-MCNC: 159 MG/DL (ref 15–102)
IGG4 SER-MCNC: 98 MG/DL (ref 2–96)
LYMPHOCYTES # BLD: 0.8 K/UL (ref 0.9–3.6)
LYMPHOCYTES NFR BLD: 11 % (ref 21–52)
MAGNESIUM SERPL-MCNC: 1.9 MG/DL (ref 1.6–2.6)
MCH RBC QN AUTO: 32.1 PG (ref 24–34)
MCHC RBC AUTO-ENTMCNC: 32.6 G/DL (ref 31–37)
MCV RBC AUTO: 98.3 FL (ref 74–97)
MONOCYTES # BLD: 0.5 K/UL (ref 0.05–1.2)
MONOCYTES NFR BLD: 7 % (ref 3–10)
NEUTS SEG # BLD: 5.6 K/UL (ref 1.8–8)
NEUTS SEG NFR BLD: 79 % (ref 40–73)
PHOSPHATE SERPL-MCNC: 3.3 MG/DL (ref 2.5–4.9)
PLATELET # BLD AUTO: 141 K/UL (ref 135–420)
PMV BLD AUTO: 10.6 FL (ref 9.2–11.8)
POTASSIUM SERPL-SCNC: 4 MMOL/L (ref 3.5–5.5)
RBC # BLD AUTO: 3.43 M/UL (ref 4.7–5.5)
SERVICE CMNT-IMP: NORMAL
SODIUM SERPL-SCNC: 138 MMOL/L (ref 136–145)
WBC # BLD AUTO: 7.1 K/UL (ref 4.6–13.2)

## 2018-10-04 PROCEDURE — 97530 THERAPEUTIC ACTIVITIES: CPT

## 2018-10-04 PROCEDURE — 36415 COLL VENOUS BLD VENIPUNCTURE: CPT | Performed by: HOSPITALIST

## 2018-10-04 PROCEDURE — 94640 AIRWAY INHALATION TREATMENT: CPT

## 2018-10-04 PROCEDURE — 74011250637 HC RX REV CODE- 250/637: Performed by: INTERNAL MEDICINE

## 2018-10-04 PROCEDURE — 74011250636 HC RX REV CODE- 250/636: Performed by: INTERNAL MEDICINE

## 2018-10-04 PROCEDURE — 74011000250 HC RX REV CODE- 250: Performed by: INTERNAL MEDICINE

## 2018-10-04 PROCEDURE — 74011250637 HC RX REV CODE- 250/637: Performed by: HOSPITALIST

## 2018-10-04 PROCEDURE — 84100 ASSAY OF PHOSPHORUS: CPT | Performed by: HOSPITALIST

## 2018-10-04 PROCEDURE — 97110 THERAPEUTIC EXERCISES: CPT

## 2018-10-04 PROCEDURE — 83735 ASSAY OF MAGNESIUM: CPT | Performed by: HOSPITALIST

## 2018-10-04 PROCEDURE — 80048 BASIC METABOLIC PNL TOTAL CA: CPT | Performed by: HOSPITALIST

## 2018-10-04 PROCEDURE — 85025 COMPLETE CBC W/AUTO DIFF WBC: CPT | Performed by: HOSPITALIST

## 2018-10-04 PROCEDURE — 65660000000 HC RM CCU STEPDOWN

## 2018-10-04 PROCEDURE — 77010033678 HC OXYGEN DAILY

## 2018-10-04 PROCEDURE — 97535 SELF CARE MNGMENT TRAINING: CPT

## 2018-10-04 PROCEDURE — 74011250636 HC RX REV CODE- 250/636: Performed by: HOSPITALIST

## 2018-10-04 PROCEDURE — 74011000258 HC RX REV CODE- 258: Performed by: INTERNAL MEDICINE

## 2018-10-04 RX ORDER — DIPHENHYDRAMINE HCL 25 MG
25 CAPSULE ORAL
Status: DISCONTINUED | OUTPATIENT
Start: 2018-10-04 | End: 2018-10-08 | Stop reason: HOSPADM

## 2018-10-04 RX ORDER — ZINC OXIDE 20 G/100G
OINTMENT TOPICAL 3 TIMES DAILY
Status: DISCONTINUED | OUTPATIENT
Start: 2018-10-04 | End: 2018-10-08 | Stop reason: HOSPADM

## 2018-10-04 RX ADMIN — GUAIFENESIN 600 MG: 600 TABLET, EXTENDED RELEASE ORAL at 21:23

## 2018-10-04 RX ADMIN — IPRATROPIUM BROMIDE AND ALBUTEROL SULFATE 3 ML: .5; 3 SOLUTION RESPIRATORY (INHALATION) at 12:00

## 2018-10-04 RX ADMIN — CHOLESTYRAMINE 4 G: 4 POWDER, FOR SUSPENSION ORAL at 17:00

## 2018-10-04 RX ADMIN — ZINC OXIDE: 200 OINTMENT TOPICAL at 18:00

## 2018-10-04 RX ADMIN — FAMOTIDINE 20 MG: 20 TABLET ORAL at 17:00

## 2018-10-04 RX ADMIN — IPRATROPIUM BROMIDE AND ALBUTEROL SULFATE 3 ML: .5; 3 SOLUTION RESPIRATORY (INHALATION) at 07:33

## 2018-10-04 RX ADMIN — GUAIFENESIN 600 MG: 600 TABLET, EXTENDED RELEASE ORAL at 09:00

## 2018-10-04 RX ADMIN — Medication 5 ML: at 07:33

## 2018-10-04 RX ADMIN — AMPICILLIN SODIUM AND SULBACTAM SODIUM 3 G: 2; 1 INJECTION, POWDER, FOR SOLUTION INTRAMUSCULAR; INTRAVENOUS at 12:00

## 2018-10-04 RX ADMIN — THERA TABS 1 TABLET: TAB at 09:42

## 2018-10-04 RX ADMIN — ENOXAPARIN SODIUM 40 MG: 100 INJECTION SUBCUTANEOUS at 17:00

## 2018-10-04 RX ADMIN — AMPICILLIN SODIUM AND SULBACTAM SODIUM 3 G: 2; 1 INJECTION, POWDER, FOR SOLUTION INTRAMUSCULAR; INTRAVENOUS at 17:00

## 2018-10-04 RX ADMIN — ZINC OXIDE: 200 OINTMENT TOPICAL at 11:00

## 2018-10-04 RX ADMIN — CHOLESTYRAMINE 4 G: 4 POWDER, FOR SUSPENSION ORAL at 12:00

## 2018-10-04 RX ADMIN — IPRATROPIUM BROMIDE AND ALBUTEROL SULFATE 3 ML: .5; 3 SOLUTION RESPIRATORY (INHALATION) at 19:19

## 2018-10-04 RX ADMIN — Medication 5 ML: at 19:19

## 2018-10-04 RX ADMIN — Medication 1 CAPSULE: at 09:42

## 2018-10-04 RX ADMIN — FAMOTIDINE 20 MG: 20 TABLET ORAL at 09:42

## 2018-10-04 RX ADMIN — AMPICILLIN SODIUM AND SULBACTAM SODIUM 3 G: 2; 1 INJECTION, POWDER, FOR SOLUTION INTRAMUSCULAR; INTRAVENOUS at 06:15

## 2018-10-04 RX ADMIN — IPRATROPIUM BROMIDE AND ALBUTEROL SULFATE 3 ML: .5; 3 SOLUTION RESPIRATORY (INHALATION) at 16:18

## 2018-10-04 RX ADMIN — AMPICILLIN SODIUM AND SULBACTAM SODIUM 3 G: 2; 1 INJECTION, POWDER, FOR SOLUTION INTRAMUSCULAR; INTRAVENOUS at 02:39

## 2018-10-04 RX ADMIN — ZINC OXIDE: 200 OINTMENT TOPICAL at 21:25

## 2018-10-04 RX ADMIN — CHOLESTYRAMINE 4 G: 4 POWDER, FOR SUSPENSION ORAL at 09:42

## 2018-10-04 NOTE — ROUTINE PROCESS
Bedside shift change report given to Mrs. Nuria Harris RN (oncoming nurse) by Damián Coulter RN (offgoing nurse). Report included the following information SBAR, Kardex, Procedure Summary, Intake/Output, MAR and Recent Results.

## 2018-10-04 NOTE — PROGRESS NOTES
conducted a Follow up consultation and Spiritual Assessment for Adama Sampson, who is a 68 y. o.,male. The  provided the following Interventions: 
Patient did not seem to clearly understand that  was from the  [address] Offered prayer and assurance of continued prayer on patients behalf. Chart reviewed. Plan: 
Chaplains will continue to follow and will provide pastoral care on an as needed/requested basis.  recommends bedside caregivers page  on duty if patient shows signs of acute spiritual or emotional distress. [address] [facility] [phone]

## 2018-10-04 NOTE — ROUTINE PROCESS
Bedside and Verbal shift change report given to [address] Pena (oncoming nurse) by Gautam Bennett RN (offgoing nurse). Report included the following information SBAR, Kardex, Intake/Output, MAR and Recent Results. Patient sitting up in bed engaging in shift report.

## 2018-10-04 NOTE — PROGRESS NOTES
Infectious Disease progress Note Requested by: dr. More Garcia Reason: gram positive bacteremia, pneumonia Current abx Prior abx Amp/sulbactam since 9/28 Levofloxacin 9/26-9/27 
azithromycin 9/27- 9/30 
ceftriaxone 9/27-9/28 Lines:  
 
 
Assessment : 
68 y.o. male with no known significant past medical history presented to ed on 9/26/18 with generalized weakness, inability to get out of his chair. CT chest: Bronchitis, bronchiolitis with severe cystic bronchiectasis worse in the bilateral lower lobes with bilateral lung base pneumonia and bilateral lower lobe cysts with air-fluid levels. Clinical picture consistent with bilateral pneumonia superimposed on underlying bronchiectasis, chronic lung disease. Exact microbial etiology of infection unclear. 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 Patient at risk for pseudomonas infection. Unable to get sputum cx. Induced sputum cx ordered today. Current clinical improvement on amp/sulbactam argues against pseudomonas, mycobacterial infection. Hence, will continue current abx while awaiting sputum cx. Swallow evaluation results indicated risk for aspiration. Single positive blood culture for staph species 9/26 likely contaminant. Rash on face/LE: likely drug rash - unable to determine which med. Will monitor for abx allergy Recommendations: 1. cont amp/sulbactam 
2. F/u Pulmonary recommendations 3. F/u sputum cultures 4. F/u cbc, clinically 5. Benadryl prn. Will attempt to d/c amp/sulbactam based on sputum cx & monitor rash Advance Care planning: DNR: discussed  with patient/surrogate decision maker:Josseline Del Valle: [phone] Above plan was discussed in details with patient. Please call me if any further questions or concerns. Will continue to participate in the care of this patient. subjective: 
 
Feels better. Denies chest pain, increased cough, chills, fever.  Patient denies headaches, visual disturbances, sore throat,earaches,  abdominal pain, diarrhea, burning micturition, pain or weakness in extremities. He denies back pain/flank pain. There are no discharge medications for this patient. Current Facility-Administered Medications Medication Dose Route Frequency  sodium chloride 10% 15 ml hypertonic neb solution  5 mL Nebulization BID RT  
 Menthol-Zinc Oxide (Calmoseptine) 0.44-20.6 % ointment   Topical TID  influenza vaccine 2018- (6 mos+)(PF) (FLUARIX QUAD/FLULAVAL QUAD) injection 0.5 mL  0.5 mL IntraMUSCular ONCE  
 albuterol-ipratropium (DUO-NEB) 2.5 MG-0.5 MG/3 ML  3 mL Nebulization QID RT  
 cholestyramine-aspartame (QUESTRAN LIGHT) packet 4 g  4 g Oral TID WITH MEALS  
 acetaminophen (TYLENOL) tablet 500 mg  500 mg Oral Q6H PRN  
 lactobacillus sp. 50 billion cpu (BIO-K PLUS) capsule 1 Cap  1 Cap Oral DAILY  ampicillin-sulbactam (UNASYN) 3 g in 0.9% sodium chloride (MBP/ADV) 100 mL MBP  3 g IntraVENous Q6H  
 enoxaparin (LOVENOX) injection 40 mg  40 mg SubCUTAneous Q24H  
 ondansetron (ZOFRAN ODT) tablet 4 mg  4 mg Oral Q6H PRN  
 famotidine (PEPCID) tablet 20 mg  20 mg Oral BID  therapeutic multivitamin (THERAGRAN) tablet 1 Tab  1 Tab Oral DAILY  guaiFENesin ER (MUCINEX) tablet 600 mg  600 mg Oral Q12H Allergies: Cat dander and Pollen extracts Temp (24hrs), Av.5 °F (36.4 °C), Min:96.7 °F (35.9 °C), Max:98.2 °F (36.8 °C) Visit Vitals  /79 (BP 1 Location: Right arm, BP Patient Position: At rest)  Pulse 95  Temp 97.2 °F (36.2 °C)  Resp 18  Ht 5' 11\" (1.803 m)  Wt 53.4 kg (117 lb 11.2 oz)  SpO2 94%  BMI 16.42 kg/m2 ROS: 12 point ROS obtained in details. Pertinent positives as mentioned in HPI,  
otherwise negative Physical Exam: 
 
 
Constitutional: He is oriented to person, place, and time. He appears well-nourished. Frail, elderly HENT:  
Head: Normocephalic and atraumatic. Eyes: EOM are normal. Pupils are equal, round, and reactive to light. Neck: Normal range of motion. Neck supple. Cardiovascular: Normal rate, regular rhythm and normal heart sounds. Exam reveals no friction rub. No murmur heard. Pulmonary/Chest: Effort normal. No respiratory distress. Bilateral LL rales, occasional wheezes Abdominal: Soft. He exhibits no distension. There is no tenderness. There is no rebound and no guarding. Musculoskeletal: Normal range of motion. Neurological: He is alert and oriented to person, place, and time. Skin: Skin is warm and dry. Psychiatric: He has a normal mood and affect. His behavior is normal. Thought content normal.  
  
 
Labs: Results:  
Chemistry Recent Labs 10/04/18 
 0158 GLU  73* NA  138  
K  4.0  
CL  99* CO2  31 BUN  7  
CREA  0.38* CA  7.0* AGAP  8  
BUCR  18 CBC w/Diff Recent Labs 10/04/18 
 0158 WBC  7.1 RBC  3.43* HGB  11.0*  
HCT  33.7*  
PLT  141 GRANS  79* LYMPH  11* EOS  3 Microbiology Recent Labs 10/03/18 
 1150 CULT  PENDING  
  
 
 
RADIOLOGY: 
 
All available imaging studies/reports in [facility] for this admission were reviewed Dr. Katya Dill, Infectious Disease Specialist 
[phone] October 4, 2018 
1:31 PM

## 2018-10-04 NOTE — PROGRESS NOTES
Problem: Self Care Deficits Care Plan (Adult) Goal: *Acute Goals and Plan of Care (Insert Text) Occupational Therapy Goals Initiated 9/29/2018 within 7 day(s). 1.  Patient will perform grooming with supervision/set-up 2. Patient will perform upper body dressing with supervision/set-up. 3.  Patient will perform lower body dressing with moderate assistance with AE prn. 
4.  Patient will perform BSC transfers with maximal assistance. 5.  Patient will perform all aspects of toileting with moderate assistance . Outcome: Progressing Towards Goal 
Occupational Therapy TREATMENT Patient: Shira Farah (36 y.o. male) Date: 10/4/2018 Diagnosis: Acute bronchitis with COPD ([facility] [address] SOB (shortness of breath) Hypoxia Community acquired pneumonia Precautions: Fall, Aspiration, Skin Chart, occupational therapy assessment, plan of care, and goals were reviewed. ASSESSMENT: 
Pt required max encouragement to participate in OT. Pt agreed to maneuver to EOB for ADL training. Pt required Mod Ax2 to maneuver to sit EOB. Pt completed ADL grooming task w/SBA following which pt educated on scapular strengthening TherEx seated in the chair, pt required Max visual cues to complete. Pt is returning to sup after <5 min of sitting EOB, stating he wishes to rest,and that he had a bad night. Pt noted to have a BM, required min A to roll and Max A to complete a bowel hygiene. Pt was encouraged to perform his bladder hygiene supine, pt required Max VCs to complete, and SBA for safety as pt has a condom cath. Pt educated on and performed BUE TherEx shd flex and scapular retraction x10 ea, requiring visual cues for proper technique. Pt is very self-limiting throughout the session. Spoke with pt's brother outside the room at the end of the session, per pt's brother pt and his mother reside together and require have aid to assist them in their ADLs 2/2 disability.  Pt's brother reports he might try to encourage the pt to participate in OT. (Stating he is there between 10:30 and 12pm every day) Progression toward goals: 
[]          Improving appropriately and progressing toward goals [x]          Improving slowly and progressing toward goals 
[]          Not making progress toward goals and plan of care will be adjusted PLAN: 
Patient continues to benefit from skilled intervention to address the above impairments. Continue treatment per established plan of care. Discharge Recommendations:  [facility] Further Equipment Recommendations for Discharge:  TBD  
  
G-CODES:  
 
Self Care  Current  CL= 60-79%  Goal  CJ= 20-39%. The severity rating is based on the Level of Assistance required for Functional Mobility and ADLs. SUBJECTIVE:  
Patient stated I feel like a little baby, so embarrassing .  OBJECTIVE DATA SUMMARY:  
Cognitive/Behavioral Status: 
Neurologic State: Alert Orientation Level: Oriented to person, Oriented to time, Oriented to place Cognition: Follows commands Safety/Judgement: Fall prevention Functional Mobility and Transfers for ADLs: 
 Bed Mobility: 
Rolling: Minimum assistance Supine to Sit: Moderate assistance;Assist x2 Sit to Supine: Moderate assistance;Assist x2 Scooting: Maximum assistance;Assist x2 Balance: 
Sitting: Impaired Sitting - Static: Fair (occasional) Sitting - Dynamic: Poor (constant support) ADL Intervention: 
 Grooming Grooming Assistance: Stand-by assistance (seated EOB) Brushing/Combing Hair: Stand-by assistance Toileting Bladder Hygiene: Stand-by assistance Bowel Hygiene: Maximum assistance Therapeutic Exercises:  
See above Pain: 
Pt reports 0/10 pain or discomfort prior to treatment.   
Pt reports 0/10 pain or discomfort post treatment. Activity Tolerance:   
Fair Please refer to the flowsheet for vital signs taken during this treatment. After treatment: []  Patient left in no apparent distress sitting up in chair 
[x]  Patient left in no apparent distress in bed 
[x]  Call bell left within reach [x]  Nursing notified 
[]  Caregiver present [x]  Bed alarm activated THU Harvey Time Calculation: 25 mins

## 2018-10-04 NOTE — PROGRESS NOTES
Problem: Mobility Impaired (Adult and Pediatric) Goal: *Acute Goals and Plan of Care (Insert Text) Physical Therapy Goals Initiated 9/29/2018 and to be accomplished within 7 day(s) 1. Patient will move from supine to sit and sit to supine , scoot up and down and roll side to side in bed with supervision/set-up. 2.  Patient will transfer from bed to chair and chair to bed with minimal assistance using the least restrictive device. 3.  Patient will perform sit to stand with minimal assistance/contact guard assist. 
4.  Patient will ambulate with minimal assistance for 5-10 feet with the least restrictive device. Outcome: Progressing Towards Goal 
physical Therapy TREATMENT Patient: Delilah Munoz (77 y.o. male) Date: 10/4/2018 Diagnosis: Acute bronchitis with COPD ([facility] [address] SOB (shortness of breath) Hypoxia Community acquired pneumonia Precautions: Fall, Aspiration, Skin Chart, physical therapy assessment, plan of care and goals were reviewed. OBJECTIVE/ ASSESSMENT: 
Nursing cleared pt to participate with PT. Patient found semi reclined in bed willing to work with PT with Max encouragement. Patient seen with OT to maximize safety of patient and staff. Pt continues to initially refuse therapy but after max encouragement agrees to transferring to EOB but adamantly declines sitting up in recliner chair. Pt transferred to EOB with Mod Ax2 and increased time. Pt performed minimal seated therex (see below) with constant VC's for staying on task. Pt tolerated sitting on EOB for less than 5 minutes before requesting to return to supine. Encouraged pt to try and sit up for longer. However, pt declined and attempts to lay back down without being cued to do so. Pt required Min/Mod Ax2 to return to supine. Pt then performed rolling from side to side with Min A for kim care to be performed due to pt having a BM while seated on EOB.  Pt initially stated that he was unable to tell when he had a BM. However, when kim care was being performed by ABEL, pt stated \"It's coming it's coming\" and had an additional BM. Pt scooted up towards [address] with Max Ax2 and was positioned to comfort. Pt was left comfortably semi reclined in bed with all needs in reach and brother present. Pt's brother was waiting outside of room and gave this LPTA some background information on pt's PLOF. Pt's brother stated that pt has lived with his mother his entire life who is now at [facility]. Pt was put on disability 20 years ago and since being put on disability, pt's brother stated that pt mostly stays in bed all day and refuses to get up even to use the bathroom and will just defecate on himself. Pt's brother also explained that pt is very stubborn and usually wont do things you ask him to do unless he is being forced. Education: bed mobility, therex, importance of OOB activity, role of PT Progression toward goals: 
[]      Improving appropriately and progressing toward goals [x]      Improving slowly and progressing toward goals 
[]      Not making progress toward goals and plan of care will be adjusted PLAN: 
Patient continues to benefit from skilled intervention to address the above impairments. Continue treatment per established plan of care. Discharge Recommendations:  [facility] vs [facility] pending progress and participation with therapy. Further Equipment Recommendations for Discharge:  TBD SUBJECTIVE:  
Patient stated It's coming it's coming. \" (referring to having a BM. Pt stated earlier he was unable to tell when he was having a BM) OBJECTIVE DATA SUMMARY:  
Functional Mobility Training: 
Bed Mobility: 
Rolling: Minimum assistance Supine to Sit: Moderate assistance;Assist x2 Sit to Supine: Moderate assistance;Assist x2 Scooting: Maximum assistance;Assist x2 Balance: 
Sitting: Impaired Sitting - Static: Fair (occasional) Sitting - Dynamic: Poor (constant support) Therapeutic Exercises:  
 
 
EXERCISE Sets Reps Active Active Assist  
Passive Self- assited ROM Comments Ankle Pumps 1 20  [x] [] [] [] supine Quad Sets   [] [] [] [] Glut Sets   [] [] [] [] Short Arc Quads   [] [] [] [] Heel Slides   [] [] [] [] Straight Leg Raises   [] [] [] [] Hip Abd   [] [] [] [] Long Arc Quads 1 5 [x] [] [] [] Seated Marching   [] [] [] [] Seated Knee Flexion   [] [] [] []   
Standing Marching   [] [] [] []   
 
Pain: 
Pre tx pain: not rated Post tx pain: not rated Activity Tolerance:  
poor Please refer to the flowsheet for vital signs taken during this treatment. After treatment:  
[] Patient left in no apparent distress sitting up in chair 
[x] Patient left in no apparent distress in bed 
[x] Call bell left within reach [x] Nursing notified 
[x] brother present 
[] Bed alarm activated 
[] SCDs applied 
[] Ice applied Joel Nipper, PTA Time Calculation: 25 mins Mobility [id number] Current  CL= 60-79%. The severity rating is based on the Level of Assistance required for Functional Mobility and ADLs. Mobility   Goal  CJ= 20-39%. The severity rating is based on the Level of Assistance required for Functional Mobility and ADLs.

## 2018-10-04 NOTE — PROGRESS NOTES
Mews of 4. Patient has been suctioned clear nose and throat, O2 of 88. After Suctioning O2 98. Heart Rate of 114,Sinus Tach  Resolved after suctioning heart rate of 98 MD Jamison is aware ,   No orders given will continue to monitor 10/04/18 1507 Vital Signs Temp 97.2 °F (36.2 °C) Temp Source Oral  
Pulse (Heart Rate) (!) 114 Heart Rate Source Monitor Resp Rate 22  
O2 Sat (%) (!) 88 % 
Miquel Stiles RN notified) Level of Consciousness Alert /71 MAP (Calculated) 89 BP 1 Method Automatic  
BP 1 Location Right arm BP Patient Position At rest  
MEWS Score 4

## 2018-10-04 NOTE — ROUTINE PROCESS
Scattered rash, unraised and redden. Rash noted cover back, upper,lower extremities, face, chest and abdomen. Patient complaints of some itching. Report given to [facility] RN oncoming nurse.

## 2018-10-04 NOTE — WOUND CARE
Physical Exam  
Room 216: pt's reddened bottom looks less severe, staff report red scattered rash looks worse. Will discontinue the calmoseptine ointment & start plain zinc oxide 20% ointment as a skin barrier from stool incontinence. Pt has had another small loose stool, pericare provided & bed pad changed. Low air loss specialty bed Envision mattress on versacare frame in use with minimal layers for optimal microclimate promotion.   
Santo Melendez BSN, RN, [facility], [address]

## 2018-10-04 NOTE — PROGRESS NOTES
NUTRITION Nursing Referral: [facility] Nutrition Consult: General Nutrition Management & Supplements RECOMMENDATIONS / PLAN:  
 
- Modify supplements: Ensure Enlive, TID. - Monitor and encourage po intake as tolerated. - Continue RD inpatient monitoring and evaluation. NUTRITION INTERVENTIONS & DIAGNOSIS:  
 
[x] Meals/snacks: general/healthful diet [x] Medical food supplement therapy: Magic Cup TID- modify Nutrition Diagnosis:  
Underweight related to inadequate energy intake as evidenced by pt BMI of 14 kg/m^2 upon admission. Unintended weight loss related to inadequate energy intake as evidenced by 14 lb, 11% weight loss from reported usual weight. Patient meets criteria for Severe Protein Calorie Malnutrition as evidenced by:  
ASPEN Malnutrition Criteria Acute Illness, Chronic Illness, or Social/Enviornmental: Chronic illness Energy Intake: Less than/equal to 75% of est energy req for greater than/equal to 1 month Muscle Mass: Severe (Clavicle, scapular, patellar & anterior thigh regions) ASPEN Malnutrition Score - Chronic Illness: 12.  
 
ASSESSMENT:  
 
10/4: Pt reports good appetite but per nursing pt with poor intake. Ate about 20% of eggs this am. NFPE completed today. Pt does not want lunch today, discussed food options, pt finally agreeable to sandwich. 10/2: Pt reports good meal intake today but per chart pt with poor intake/appetite. Nursing unsure about meal intake today, with plan to monitor. Dislikes Ensure, plan to modify. Electrolytes replaced. 9/27: Pt reports good appetite and eating well PTA, consuming lost of sweets. Hungry and has not eaten today, diet started after swallow evaluation by SLP. Agreeable to supplements. Average po intake adequate to meet patients estimated nutritional needs:   [] Yes     [] No   [x] Unable to determine at this time Diet: DIET REGULAR 1 NECTAR 
DIET NUTRITIONAL SUPPLEMENTS Breakfast, Lunch, Dinner; [address]     
 Food Allergies: NKFA Current Appetite:   [] Good     [x] Fair     [x] Poor     [] Other: 
Appetite/meal intake prior to admission:   [x] Good     [] Fair     [] Poor     [x] Other: spoke with pt's brother 10/4 who reports pt typically consumes 2 donuts for breakfast, skips lunch but consumes 3 beers, and very small meal for dinner (typically a piece of chicken) Feeding Limitations:  [x] Swallowing difficulty: SLP following, MBS completed 9/28/18    [] Chewing difficulty    [] Other: 
Current Meal Intake:  
No data found. BM: 10/3 Skin Integrity: redness with blanching to sacral/coccyx Edema:   [x] No     [] Yes Pertinent Medications: Reviewed: zofran, lactobacillus, theragran Recent Labs 10/04/18 
 0158 NA  138  
K  4.0  
CL  99* CO2  31  
GLU  73* BUN  7  
CREA  0.38* CA  7.0*  
MG  1.9 PHOS  3.3 Intake/Output Summary (Last 24 hours) at 10/04/18 1136 Last data filed at 10/04/18 2135 Gross per 24 hour Intake              100 ml Output              700 ml Net             -600 ml Anthropometrics: 
Ht Readings from Last 1 Encounters:  
09/28/18 5' 11\" (1.803 m) Last 3 Recorded Weights in this Encounter 10/02/18 0530 10/03/18 0421 10/04/18 0422 Weight: 49.2 kg (108 lb 6.4 oz) 50 kg (110 lb 3.2 oz) 53.4 kg (117 lb 11.2 oz) Body mass index is 16.42 kg/(m^2). Underweight Weight History: patient reports usual weight of 130 lb, unknown time period of weight loss (14 lb, 11% weight loss) Weight Metrics 10/4/2018 Weight 117 lb 11.2 oz BMI 16.42 kg/m2 Admitting Diagnosis: Acute bronchitis with COPD ([address] SOB (shortness of breath) Hypoxia Pertinent PMHx: No pertinent PMH Education Needs:        [x] None identified  [] Identified - Not appropriate at this time  []  Identified and addressed - refer to education log Learning Limitations:   [x] None identified  [] Identified Cultural, Holiness & ethnic food preferences:  [x] None identified    [] Identified and addressed ESTIMATED NUTRITION NEEDS:  
 
Calories: 5108-2732 kcal (MSJx1.2-1.5) based on  [x] Actual BW 53 kg     [] IBW Protein: 64-80 gm (1.2-1.5 gm/kg) based on  [x] Actual BW      [] IBW Fluid: 1 mL/kcal 
  
MONITORING & EVALUATION:  
 
Nutrition Goal(s): 1. Po intake of meals will meet >75% of patient estimated nutritional needs within the next 7 days. Outcome:  [] Met/Ongoing    [x]  Not Met/Progressing    [] New/Initial Goal  
 
Monitoring:   [x] Food and beverage intake   [x] Diet order   [x] Nutrition-focused physical findings   [x] Treatment/therapy   [] Weight   [] Enteral nutrition intake Previous Recommendations (for follow-up assessments only):     [x]   Implemented       []   Not Implemented (RD to address)      [] No Longer Appropriate     [] No Recommendation Made Discharge Planning: regular diet; consistency per SLP [x] Participated in care planning, discharge planning, & interdisciplinary rounds as appropriate Katie Gomes RD Pager: [phone]

## 2018-10-04 NOTE — PROGRESS NOTES
Hospitalist Progress Note Patient: Lexi Roberts MRN: [id number]  CSN: [id number] YOB: 1945  Age: 68 y.o. Sex: male DOA: 9/26/2018 LOS:  LOS: 8 days Requiring suctioning in order to maintain sats. Breathing improving with nebs, although he does not like the taste. Assessment/Plan 1. Hypoxia 82% on RA in ED. 
2. B.l pna superimposed on underlying bronchiectasis, chronic lung disease. unasyn per ID. 3. Severe sepsis poa (tachycardia, hypoxia) with evidence of end organ damage (lactic acidosis, elevated trop) - source m/l lungs 4. Lactic acidosis - resolved. 5. Elevated trop - flat, not c/w ACS. Echo noted. 6. Chronic bronchiectasis and cystic lung disease with superinfection causing air fluid levels and micro- macroabscess formation and suppurative lower respiratory infection. Bronchitis, bronchiolitis with severe cystic bronchiectasis worse in the bilateral lower lobes with bilateral lung base pneumonia and bilateral lower lobe cysts with air-fluid levels. pulm input appreciated. 7. Falls at home, ambulatory dysfunction. 8. pelvic ring injury - non operative treatment per ortho. PT: Toe Touch Weight bearing RLE per ortho. lovenox x 4 weeks for dvt prophylaxis 9. Echo 9/28/19 EF 60%, LV moderate concentric hypertrophy, no RWMA, grade 1 DD. Mild TR. Mild - mod pulmonary hypertension. Mild AR. 10. 2/4 blood culture + staph hemolyticus likely contaminant - repeat blood cx (9/28) ngtd - ID input appreciated 11. Difficulty caring for himself at home. Has resided w/ his Mom for 67 years, mom recently went to [facility] rehab, family interested in them having shared room (per CM Apurva can accomodate). 12. mod pharyngeal dysphagia c/b silent penetration to laryngeal vestibule with thin liquid - s/p MBS - SLP recs noted. will require ST services upon DC from this facility. 13. Underweight Body mass index is 16.42 kg/(m^2). On supplements per nutritionist. multivit 14. Hypophosphatemia replete as needed. 15. DNR. No durable DNR on file - family to discuss. brother Mike Hodgkin [phone] Additional Notes:   
 
Case discussed with:  [x]Patient  []Family  [x]Nursing  []Case Management DVT Prophylaxis:  []Lovenox  [x]Hep SQ  []SCDs  []Coumadin   []On Heparin gtt Vital signs/Intake and Output: 
Visit Vitals  /71 (BP 1 Location: Right arm, BP Patient Position: At rest)  Pulse (!) 114  Temp 97.2 °F (36.2 °C)  Resp 22  
 Ht 5' 11\" (1.803 m)  Wt 53.4 kg (117 lb 11.2 oz)  SpO2 (!) 88%  BMI 16.42 kg/m2 Current Shift:  10/04 0701 - 10/04 1900 In: -  
Out: 410 [ZOZDL:341] Last three shifts:  10/02 1901 - 10/04 0700 In: 300 [I.V.:300] Out: 852 [Urine:850] Thin, generally weak, awake and alert. Ncat. Perrl. Poor dentition, poor oral hygiene RRR Rhonchi to both bases, symmetrical chest expansion. abd soft nt nd nabs No edema. dp 2+ b.l No focal deficit. Generalized weakness No rash Medications Reviewed Labs: Results:  
   
Chemistry Recent Labs 10/04/18 
 0158 GLU  73* NA  138  
K  4.0  
CL  99* CO2  31 BUN  7  
CREA  0.38* CA  7.0* AGAP  8  
BUCR  18 CBC w/Diff Recent Labs 10/04/18 
 0158 WBC  7.1 RBC  3.43* HGB  11.0*  
HCT  33.7*  
PLT  141 GRANS  79* LYMPH  11* EOS  3 Cardiac Enzymes No results for input(s): CPK, CKND1, SUMMER in the last 72 hours. No lab exists for component: Meriam Burdock Coagulation No results for input(s): PTP, INR, APTT in the last 72 hours. No lab exists for component: INREXT, INREXT Lipid Panel Lab Results Component Value Date/Time  Cholesterol, total 112 04/04/2011 02:17 AM  
 HDL Cholesterol <10 (L) 04/04/2011 02:17 AM  
 LDL, calculated NOT CALCULATED DUE TO LOW HDLC LEVEL 04/04/2011 02:17 AM  
 VLDL, calculated  04/04/2011 02:17 AM  
 Calculation not valid with this patient's other Lipid values. Triglyceride 327 (H) 04/04/2011 02:17 AM  
 CHOL/HDL Ratio NOT CALCULATED DUE TO LOW HDLC LEVEL 04/04/2011 02:17 AM  
  
BNP No results for input(s): BNPP in the last 72 hours. Liver Enzymes No results for input(s): TP, ALB, TBIL, AP, SGOT, GPT in the last 72 hours. No lab exists for component: DBIL Thyroid Studies Lab Results Component Value Date/Time TSH 0.69 09/28/2018 05:47 AM  
    
Procedures/imaging: see electronic medical records for all procedures/Xrays and details which were not copied into this note but were reviewed prior to creation of Plan.

## 2018-10-04 NOTE — PROGRESS NOTES
Problem: Pressure Injury - Risk of 
Goal: *Prevention of pressure injury Document Luan Scale and appropriate interventions in the flowsheet. Specialty bed ordered 10-3-18. Outcome: Progressing Towards Goal 
Pressure Injury Interventions: 
Sensory Interventions: Pressure redistribution bed/mattress (bed type) Moisture Interventions: Absorbent underpads, Check for incontinence Q2 hours and as needed, Internal/External urinary devices Activity Interventions: Increase time out of bed Mobility Interventions: Pressure redistribution bed/mattress (bed type), Suspension boots Nutrition Interventions: Document food/fluid/supplement intake Friction and Shear Interventions: Minimize layers, HOB 30 degrees or less

## 2018-10-04 NOTE — PROGRESS NOTES
[facility] Pulmonary, Critical Care, and Sleep Medicine Progress note Name: Lexi Roberts MRN: [id number] : 1945 Hospital: [facility] Date: 10/4/2018 IMPRESSION:  
· Chronic bronchiectasis and cystic lung disease with superinfection causing air fluid levels and micro- macroabscess formation and suppurative lower respiratory infection. Chronicity unclear but suspect long standing process with decompensation. 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 Currently being treated with antibiotics to cover Aspiration and CAP pathogens- agree. In addition at risk for pseudomonas infection and also concern for atypical mycobacterial infection. IGG subclasses- normal.Sputum eosinophilia + · Bronchitis, bronchiolitis with severe cystic bronchiectasis worse in the bilateral lower lobes with bilateral lung base pneumonia and bilateral lower lobe cysts with air-fluid levels · Hypoxic respiratory insufficiency- needing 2-3 L supplemental O2 at rest 
· Pulmonary HTN secondary to above- group 3 · Dysphagia- aspiration risk · Low BMI · S/P fall- pelvic ring injury RECOMMENDATIONS:  
· Oxygen- titrate to SaO2 goal >92% · Bronchial hygiene protocol- will add hypertonic saline, VEST percussion and postural drainage to facilitate clearing mucus. Should be discharged with VEST · Bronchodilators added to facilitate mucociliary clearance · Steroids- not indicated · Antibiotics- agree with current coverage. Will check additional cultures · Aspiration precautions · Need for further diagnostics- follow up CT scan in 12 weeks. · No indication for bronchoscopy · Out patient testing- PFT, 6 min walk, Polysomnogram 
· Assess home Oxygen needs at discharge · OT, PT, OOB and ambulate · Healthy weight, nutrition · DNR · Will Follow · DVT, PUD prophylaxis Subjective: This patient has been seen and evaluated at the request of Dr. Felicita Hughes for bilateral pneumonia and lung cysts with bronchiectasis. 10/04/18 Feels better Coughing- sounds congested and trying to mobilize Feels less wheezing with bronchodilators Mouth dry HPI: 
Patient is a 68 y.o. male Park Gravely is a 68 y.o. male who had been living with his mother, who was very recently placed in [address] [place] [address]. The patient presents to the ER stating he has been coughing and wheezing. He adds that he has fallen several times at home and has been \"stuck in a chair\" unable to get up due to pain in left leg after his last fall onto his legs. Reportedly family checked on him to find the patient siting in excrement in chair and called medics. In the ER the patient was hypoxic 82% pulse ox on room air and tachycardic and has required O2 4l nc now titrated to 2l for pulse ox 93%. C/o cough with difficulty expectorating mucus. Denies any SOB. Denies chest pain. Denies nausea, vomiting, fever/ chills Admits to sinus congestion and drainage Worked in a [employer]. Denies working in [employer]. Non smoker but significant second hand smoke exposure Denies ever being diagnosed with Pulmonary problems. Prior to Admission medications Not on File Allergies Allergen Reactions  Cat Dander Sneezing  Pollen Extracts Runny Nose Current Facility-Administered Medications Medication Dose Route Frequency  zinc oxide 20 % ointment   Topical TID  sodium chloride 10% 15 ml hypertonic neb solution  5 mL Nebulization BID RT  
 albuterol-ipratropium (DUO-NEB) 2.5 MG-0.5 MG/3 ML  3 mL Nebulization QID RT  
 cholestyramine-aspartame (QUESTRAN LIGHT) packet 4 g  4 g Oral TID WITH MEALS  lactobacillus sp. 50 billion cpu (BIO-K PLUS) capsule 1 Cap  1 Cap Oral DAILY  ampicillin-sulbactam (UNASYN) 3 g in 0.9% sodium chloride (MBP/ADV) 100 mL MBP  3 g IntraVENous Q6H  
  enoxaparin (LOVENOX) injection 40 mg  40 mg SubCUTAneous Q24H  
 famotidine (PEPCID) tablet 20 mg  20 mg Oral BID  therapeutic multivitamin (THERAGRAN) tablet 1 Tab  1 Tab Oral DAILY  guaiFENesin ER (MUCINEX) tablet 600 mg  600 mg Oral Q12H Review of Systems: A comprehensive review of systems was negative except for that written in the HPI. Objective:  
Vital Signs:   
Visit Vitals  /71 (BP 1 Location: Right arm, BP Patient Position: At rest)  Pulse (!) 114  Temp 97.2 °F (36.2 °C)  Resp 22  
 Ht 5' 11\" (1.803 m)  Wt 53.4 kg (117 lb 11.2 oz)  SpO2 (!) 88% Iman Anaya RN notified  BMI 16.42 kg/m2 O2 Device: Nasal cannula O2 Flow Rate (L/min): 2 l/min Temp (24hrs), Av.5 °F (36.4 °C), Min:96.7 °F (35.9 °C), Max:98.2 °F (36.8 °C) Intake/Output:  
Last shift:        
Last 3 shifts: 10/02 1901 - 10/04 0700 In: 300 [I.V.:300] Out: 852 [Urine:850] Intake/Output Summary (Last 24 hours) at 10/04/18 1517 Last data filed at 10/04/18 2974 Gross per 24 hour Intake              100 ml Output              700 ml Net             -600 ml Physical Exam:  
General:  Alert, cooperative, no distress, appears stated age. Head:  Normocephalic, without obvious abnormality, atraumatic. Eyes:  Conjunctivae/corneas clear. PERRL, EOMs intact. Nose: Nares normal. Septum midline. Mucosa normal. Minimal drainage no sinus tenderness. Throat: Lips, mucosa, and tongue normal. Teeth and gums normal.  
Neck: Supple, symmetrical, trachea midline, no adenopathy, thyroid: no enlargment/tenderness/nodules, no carotid bruit and no JVD. Back:   Symmetric, no curvature. ROM normal.  
Lungs:   Bilateral diffuse ronchi, rales Chest wall:  No tenderness or deformity. Heart:  Regular rate and rhythm, S1, S2 normal, no murmur, click, rub or gallop. Abdomen:   Soft, non-tender. Bowel sounds normal. No masses,  No organomegaly. Extremities: Extremities normal, atraumatic, no cyanosis or edema. Pulses: 2+ and symmetric all extremities. Skin: Skin dry, scaly and ecchymotic Lymph nodes: Cervical, supraclavicular, and axillary nodes normal.  
Neurologic: Grossly nonfocal  
 
Data review:  
 
Recent Results (from the past 24 hour(s)) CBC WITH AUTOMATED DIFF Collection Time: 10/04/18  1:58 AM  
Result Value Ref Range WBC 7.1 4.6 - 13.2 K/uL  
 RBC 3.43 (L) 4.70 - 5.50 M/uL  
 HGB 11.0 (L) 13.0 - 16.0 g/dL HCT 33.7 (L) 36.0 - 48.0 % MCV 98.3 (H) 74.0 - 97.0 FL  
 MCH 32.1 24.0 - 34.0 PG  
 MCHC 32.6 31.0 - 37.0 g/dL  
 RDW 13.2 11.6 - 14.5 % PLATELET 379 769 - 077 K/uL MPV 10.6 9.2 - 11.8 FL  
 NEUTROPHILS 79 (H) 40 - 73 % LYMPHOCYTES 11 (L) 21 - 52 % MONOCYTES 7 3 - 10 % EOSINOPHILS 3 0 - 5 % BASOPHILS 0 0 - 2 %  
 ABS. NEUTROPHILS 5.6 1.8 - 8.0 K/UL  
 ABS. LYMPHOCYTES 0.8 (L) 0.9 - 3.6 K/UL  
 ABS. MONOCYTES 0.5 0.05 - 1.2 K/UL  
 ABS. EOSINOPHILS 0.2 0.0 - 0.4 K/UL  
 ABS. BASOPHILS 0.0 0.0 - 0.1 K/UL  
 DF AUTOMATED MAGNESIUM Collection Time: 10/04/18  1:58 AM  
Result Value Ref Range Magnesium 1.9 1.6 - 2.6 mg/dL METABOLIC PANEL, BASIC Collection Time: 10/04/18  1:58 AM  
Result Value Ref Range Sodium 138 136 - 145 mmol/L Potassium 4.0 3.5 - 5.5 mmol/L Chloride 99 (L) 100 - 108 mmol/L  
 CO2 31 21 - 32 mmol/L Anion gap 8 3.0 - 18 mmol/L Glucose 73 (L) 74 - 99 mg/dL BUN 7 7.0 - 18 MG/DL Creatinine 0.38 (L) 0.6 - 1.3 MG/DL  
 BUN/Creatinine ratio 18 12 - 20 GFR est AA >60 >60 ml/min/1.73m2 GFR est non-AA >60 >60 ml/min/1.73m2 Calcium 7.0 (L) 8.5 - 10.1 MG/DL  
PHOSPHORUS Collection Time: 10/04/18  1:58 AM  
Result Value Ref Range Phosphorus 3.3 2.5 - 4.9 MG/DL Imaging: 
I have personally reviewed the patients radiographs and have reviewed the reports: XR Results (most recent): 
 
Results from Hospital Encounter encounter on 09/26/18 XR SWALLOW FUNC VIDEO Narrative EXAM:  Modified Barium Swallow (Video Barium Swallow). CPT  52844 CLINICAL INDICATION:  
 
- Bronchitis, family with reports of coughing with PO, pt with reports of 
difficulty swallowing.   
- Bedside exam with mild pharyngeal dysphagia. Weak laryngeal elevation to 
palpation with all tested bedside boluses. Full MBS felt to be needed by the 
bedside exam to rule out occult silent aspiration. COMPARISON:  None. TECHNIQUE:   
 
- This study was performed in conjunction with speech pathologist with the 
patient in lateral upright position.   
- Tested consistencies: Thin (via cup/straw), nectar (via cup), puree, solid, 
and tablet challenge utilizing nectar consistency wash. - Fluoroscopy duration:  1 minute 42 seconds. - Exposures:  7 cinefluoroscopy loops. FINDINGS: 
 
There was moderate penetration on thin consistency boluses through both the cup 
and straw. No definite penetration or aspiration was noted on other tested 
boluses. Vallecular and piriform sinus residuals were observed with all tested 
consistencies. Impression IMPRESSION: 
 
1. Moderate penetration on thin liquids with both cup and straw. No collin 
aspiration or penetration on other tested consistencies. 2.  Vallecular and piriform sinus residuals with all tested boluses. Thank you for your referral.   
 
   
 
 
CT Results (most recent): 
 
Results from Hospital Encounter encounter on 09/26/18 CT PELV WO CONT Narrative CT pelvis without IV contrast 
 
HISTORY: Patient fell. Pain. Reference: Radiographs September 27, 2018 All CT scans at this facility are performed using dose optimization technique as 
appropriate to a performed exam, to include automated exposure control, 
adjustment of the mA and/or kV according to patient size (including appropriate 
matching for site specific examination) or use of iterative reconstruction 
technique. Axial CT images were obtained. Sagittal and coronal images were reformatted. Osteoporosis. Slightly displaced oblique fracture in the right ilium, from the mid iliac crest 
extending to slightly above the acetabular roof. Suspect mild fracture at the 
lateral margin of the sacrum, at the sacroiliac joint on the right and also on 
the left. Subacute or old fracture in the right inferior pubic ramus. Pubic symphysis is 
intact. No definite displaced fracture in the left pubic rami. Advanced degenerative joint disease in both hips with prominent osteophytes. No 
acute femoral neck fracture or hip dislocation. No hip joint effusion. No internal pelvic hematoma. Mild residual high density within the bladder, 
likely from IV contrast received on September 26, 2018. Prostate is slightly 
enlarged with central calcification. Moderately severe fecal retention in the 
distended rectosigmoid. No pelvic inflammation. Normal appendix. No free fluid. Impression IMPRESSION: 
 
1. Acute, slightly displaced right iliac fracture. Also suspect subtle acute 
fracture in the sacral side of the sacroiliac joints bilaterally. 2. Subacute or old right inferior pubic ramus fracture. 3. Additional findings as described. 09/26/18 ECHO ADULT COMPLETE 09/28/2018 9/28/2018 Narrative · Calculated left ventricular ejection fraction is 60%. Visually measured  
ejection fraction. Left ventricular cavity size is dilated. Left  
ventricular moderate concentric hypertrophy. Normal left ventricular wall  
motion, no regional wall motion abnormality noted. Mild (grade 1) left  
ventricular diastolic dysfunction. · Mild tricuspid valve regurgitation is present. Pulmonary arterial  
systolic pressure is 48 mmHg. Mild to moderate pulmonary hypertension is  
present. · Mitral valve non-specific thickening. Mitral annular calcification. · Mild aortic valve regurgitation is present.  
   
  Signed by: Gloria Feliciano MD  
 High complexity decision making was performed during the evaluation of this patient at high risk for decompensation with multiple organ involvement 
  
Above mentioned total time spent on reviewing the case/medical record/data/notes/EMR/patient examination/documentation/coordinating care with nurse/consultants, exclusive of procedures with complex decision making performed and > 50% time spent in face to face evaluation.  
 
 
 
  
Garth Heath MD

## 2018-10-04 NOTE — PROGRESS NOTES
Mews of 4. Patient has been suctioned clear nose and throat, O2 of 88. After Suctioning O2 98. Heart Rate of 114,Sinus Tach  Resolved after suctioning heart rate of 98 
 
 10/04/18 1507 Vital Signs Temp 97.2 °F (36.2 °C) Temp Source Oral  
Pulse (Heart Rate) (!) 114 Heart Rate Source Monitor Resp Rate 22  
O2 Sat (%) (!) 88 % 
Zhane Comer, RN notified) Level of Consciousness Alert /71 MAP (Calculated) 89 BP 1 Method Automatic  
BP 1 Location Right arm BP Patient Position At rest  
MEWS Score 4

## 2018-10-05 PROBLEM — R09.02 HYPOXIA: Status: RESOLVED | Noted: 2018-09-26 | Resolved: 2018-10-05

## 2018-10-05 PROBLEM — R06.02 SOB (SHORTNESS OF BREATH): Status: RESOLVED | Noted: 2018-09-26 | Resolved: 2018-10-05

## 2018-10-05 PROBLEM — J18.9 COMMUNITY ACQUIRED PNEUMONIA: Status: RESOLVED | Noted: 2018-09-27 | Resolved: 2018-10-05

## 2018-10-05 LAB
BACTERIA SPEC CULT: ABNORMAL
BACTERIA SPEC CULT: ABNORMAL
GRAM STN SPEC: ABNORMAL
SERVICE CMNT-IMP: ABNORMAL

## 2018-10-05 PROCEDURE — 65660000000 HC RM CCU STEPDOWN

## 2018-10-05 PROCEDURE — 74011250637 HC RX REV CODE- 250/637: Performed by: HOSPITALIST

## 2018-10-05 PROCEDURE — 74011250637 HC RX REV CODE- 250/637: Performed by: INTERNAL MEDICINE

## 2018-10-05 PROCEDURE — 74011250636 HC RX REV CODE- 250/636: Performed by: HOSPITALIST

## 2018-10-05 PROCEDURE — 74011000250 HC RX REV CODE- 250: Performed by: INTERNAL MEDICINE

## 2018-10-05 PROCEDURE — 74011250636 HC RX REV CODE- 250/636: Performed by: INTERNAL MEDICINE

## 2018-10-05 PROCEDURE — 77010033678 HC OXYGEN DAILY

## 2018-10-05 PROCEDURE — 94640 AIRWAY INHALATION TREATMENT: CPT

## 2018-10-05 PROCEDURE — 97164 PT RE-EVAL EST PLAN CARE: CPT

## 2018-10-05 PROCEDURE — 74011000258 HC RX REV CODE- 258: Performed by: INTERNAL MEDICINE

## 2018-10-05 PROCEDURE — 97530 THERAPEUTIC ACTIVITIES: CPT

## 2018-10-05 RX ORDER — CHOLESTYRAMINE 4 G/4.8G
4 POWDER, FOR SUSPENSION ORAL
Qty: 42 PACKET | Refills: 0 | Status: SHIPPED
Start: 2018-10-05 | End: 2018-10-19

## 2018-10-05 RX ORDER — GUAIFENESIN 600 MG/1
600 TABLET, EXTENDED RELEASE ORAL EVERY 12 HOURS
Qty: 14 TAB | Refills: 0 | Status: SHIPPED
Start: 2018-10-05 | End: 2018-10-12

## 2018-10-05 RX ORDER — ACETAMINOPHEN 500 MG
500 TABLET ORAL
Qty: 25 TAB | Refills: 1 | Status: SHIPPED
Start: 2018-10-05

## 2018-10-05 RX ORDER — DIPHENHYDRAMINE HCL 25 MG
25 CAPSULE ORAL
Qty: 20 CAP | Refills: 0 | Status: SHIPPED
Start: 2018-10-05 | End: 2018-10-15

## 2018-10-05 RX ORDER — ZINC OXIDE 20 G/100G
1 OINTMENT TOPICAL 3 TIMES DAILY
Qty: 30 G | Refills: 0 | Status: SHIPPED
Start: 2018-10-05

## 2018-10-05 RX ORDER — ENOXAPARIN SODIUM 100 MG/ML
40 INJECTION SUBCUTANEOUS EVERY 24 HOURS
Qty: 21 SYRINGE | Refills: 0 | Status: SHIPPED
Start: 2018-10-05 | End: 2018-10-26

## 2018-10-05 RX ORDER — AMOXICILLIN AND CLAVULANATE POTASSIUM 875; 125 MG/1; MG/1
1 TABLET, FILM COATED ORAL 2 TIMES DAILY
Qty: 60 TAB | Refills: 0 | Status: SHIPPED
Start: 2018-10-05 | End: 2018-11-04

## 2018-10-05 RX ORDER — IPRATROPIUM BROMIDE AND ALBUTEROL SULFATE 2.5; .5 MG/3ML; MG/3ML
3 SOLUTION RESPIRATORY (INHALATION)
Qty: 100 NEBULE | Refills: 4 | Status: SHIPPED
Start: 2018-10-05

## 2018-10-05 RX ORDER — THERA TABS 400 MCG
1 TAB ORAL DAILY
Qty: 30 TAB | Refills: 11 | Status: SHIPPED
Start: 2018-10-06

## 2018-10-05 RX ADMIN — AMPICILLIN SODIUM AND SULBACTAM SODIUM 3 G: 2; 1 INJECTION, POWDER, FOR SOLUTION INTRAMUSCULAR; INTRAVENOUS at 05:20

## 2018-10-05 RX ADMIN — FAMOTIDINE 20 MG: 20 TABLET ORAL at 18:36

## 2018-10-05 RX ADMIN — ZINC OXIDE: 200 OINTMENT TOPICAL at 18:12

## 2018-10-05 RX ADMIN — FAMOTIDINE 20 MG: 20 TABLET ORAL at 10:04

## 2018-10-05 RX ADMIN — AMPICILLIN SODIUM AND SULBACTAM SODIUM 3 G: 2; 1 INJECTION, POWDER, FOR SOLUTION INTRAMUSCULAR; INTRAVENOUS at 18:11

## 2018-10-05 RX ADMIN — ZINC OXIDE: 200 OINTMENT TOPICAL at 22:00

## 2018-10-05 RX ADMIN — AMPICILLIN SODIUM AND SULBACTAM SODIUM 3 G: 2; 1 INJECTION, POWDER, FOR SOLUTION INTRAMUSCULAR; INTRAVENOUS at 13:13

## 2018-10-05 RX ADMIN — CHOLESTYRAMINE 4 G: 4 POWDER, FOR SUSPENSION ORAL at 10:04

## 2018-10-05 RX ADMIN — CHOLESTYRAMINE 4 G: 4 POWDER, FOR SUSPENSION ORAL at 13:13

## 2018-10-05 RX ADMIN — ENOXAPARIN SODIUM 40 MG: 100 INJECTION SUBCUTANEOUS at 18:11

## 2018-10-05 RX ADMIN — GUAIFENESIN 600 MG: 600 TABLET, EXTENDED RELEASE ORAL at 22:36

## 2018-10-05 RX ADMIN — AMPICILLIN SODIUM AND SULBACTAM SODIUM 3 G: 2; 1 INJECTION, POWDER, FOR SOLUTION INTRAMUSCULAR; INTRAVENOUS at 00:47

## 2018-10-05 RX ADMIN — ZINC OXIDE: 200 OINTMENT TOPICAL at 13:14

## 2018-10-05 RX ADMIN — AMPICILLIN SODIUM AND SULBACTAM SODIUM 3 G: 2; 1 INJECTION, POWDER, FOR SOLUTION INTRAMUSCULAR; INTRAVENOUS at 23:02

## 2018-10-05 RX ADMIN — IPRATROPIUM BROMIDE AND ALBUTEROL SULFATE 3 ML: .5; 3 SOLUTION RESPIRATORY (INHALATION) at 07:11

## 2018-10-05 RX ADMIN — Medication 1 CAPSULE: at 10:04

## 2018-10-05 RX ADMIN — IPRATROPIUM BROMIDE AND ALBUTEROL SULFATE 3 ML: .5; 3 SOLUTION RESPIRATORY (INHALATION) at 11:21

## 2018-10-05 RX ADMIN — CHOLESTYRAMINE 4 G: 4 POWDER, FOR SUSPENSION ORAL at 18:11

## 2018-10-05 RX ADMIN — THERA TABS 1 TABLET: TAB at 10:04

## 2018-10-05 RX ADMIN — GUAIFENESIN 600 MG: 600 TABLET, EXTENDED RELEASE ORAL at 10:04

## 2018-10-05 RX ADMIN — Medication 5 ML: at 20:58

## 2018-10-05 RX ADMIN — IPRATROPIUM BROMIDE AND ALBUTEROL SULFATE 3 ML: .5; 3 SOLUTION RESPIRATORY (INHALATION) at 20:58

## 2018-10-05 RX ADMIN — Medication 5 ML: at 07:11

## 2018-10-05 NOTE — CDMP QUERY
Please clarify if this patient is being treated/managed for: 
 
=> Severe Protein Calorie Malnutrition =>Other Explanation of clinical findings =>Unable to Determine (no explanation of clinical findings) The medical record reflects the following: 
 
Risk:73 yom sepsis d/t pna bmi 16 Clinical Indicators:  
Patient meets criteria for Severe Protein Calorie Malnutrition as evidenced by:  
ASPEN Malnutrition Criteria Acute Illness, Chronic Illness, or Social/Enviornmental: Chronic illness Energy Intake: Less than/equal to 75% of est energy req for greater than/equal to 1 month Muscle Mass: Severe (Clavicle, scapular, patellar & anterior thigh regions) 
  Treatment: Modify supplements: Ensure Enlive, TID. - Monitor and encourage po intake as tolerated. - Continue RD inpatient monitoring and evaluation. 
   
 
Please clarify and document your clinical opinion in the progress notes and discharge summary including the definitive and/or presumptive diagnosis, (suspected or probable), related to the above clinical findings. Please include clinical findings supporting your diagnosis. If you DECLINE this query or would like to communicate with [facility], please utilize the \"[facility] message box\" at the TOP of the Progress Note on the right. Thank Sharmin Bhatti RN ext [phone]

## 2018-10-05 NOTE — DISCHARGE SUMMARY
Discharge Summary    Patient: Kylee Goodwin MRN: [id number]  CSN: [id number]    YOB: 1945  Age: 68 y.o. Sex: male    DOA: 9/26/2018 LOS:  LOS: 9 days   Discharge Date:      Admission Diagnoses: Acute bronchitis with COPD ([facility]  SOB (shortness of breath)  Hypoxia    Discharge Diagnoses:    Problem List as of 10/5/2018  Date Reviewed: 9/27/2018          Codes Class Noted - Resolved    Closed pelvic ring fracture ([facility] ICD-10-CM: P82.985H  ICD-9-CM: 808.8  10/1/2018 - Present        Pain of left hip joint ICD-10-CM: M25.552  ICD-9-CM: 719.45  9/27/2018 - Present        Fall at home ICD-10-CM: [address] [place], [place]  ICD-9-CM: O792.4, E849.0  9/27/2018 - Present        Acute bronchitis with COPD ([facility] ICD-10-CM: J44.0, J20.9  ICD-9-CM: 491.22  9/26/2018 - Present        * (Principal)RESOLVED: Community acquired pneumonia ICD-10-CM: J18.9  ICD-9-CM: 172  9/27/2018 - 10/5/2018        RESOLVED: SOB (shortness of breath) ICD-10-CM: R06.02  ICD-9-CM: 786.05  9/26/2018 - 10/5/2018        RESOLVED: Hypoxia ICD-10-CM: R09.02  ICD-9-CM: 799.02  9/26/2018 - 10/5/2018            Reason for Admission  68 y.o. male who had been living with his mother since birth, Mom was very recently placed in [address] [place] [address]. The patient presented to ED with coughing and wheezing. He fell several times at home and was \"stuck in a chair\" unable to get up due to pain in left leg after his last fall onto his legs. Reportedly family checked on him to find the patient siting in excrement in chair, and called medics. In the ER, the patient was hypoxic 82% pulse ox on room air and tachycardic and required O2 4l nc now titrated to 2l for pulse ox 93%. Chest film suggested bilateral infiltrates and patient received Levaquin in ER and CT chest confirmed bronchitis and CAP.      Discharge Condition: stable    PHYSICAL EXAM at discharge  Visit Vitals    /77 (BP 1 Location: Right arm, BP Patient Position: At rest)    Pulse (!) 106    Temp 97.6 °F (36.4 °C)    Resp 18    Ht 5' 11\" (1.803 m)    Wt 53 kg (116 lb 12.8 oz)    SpO2 95%    BMI 16.29 kg/m2     Thin, generally weak, awake and alert. Ncat. Perrl. Poor dentition, poor oral hygiene  RRR  Rhonchi to both bases, symmetrical chest expansion. abd soft nt nd nabs  No edema. dp 2+ b.l  No focal deficit. Generalized weakness  No rash    Hospital Course:   1. Hypoxia 82% on RA in ED - hypoxic respiratory insufficiency- needing 2-3 L supplemental O2 at rest  2. B.l pna superimposed on underlying bronchiectasis, chronic lung disease. Sputum cx 10/3 - few candida albicans, few normal respiratory alissa. 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 Received unasyn in house. Per ID recs, po amoxicillin/clavulanate for 4 weeks  3. Severe sepsis poa (tachycardia, hypoxia) with evidence of end organ damage (lactic acidosis, elevated trop) - resolving, source m/l lungs   4. Lactic acidosis - resolved. 5. Elevated trop - flat, not c/w ACS. Echo noted. 6. Chronic bronchiectasis and cystic lung disease with superinfection causing air fluid levels and micro- macroabscess formation and suppurative lower respiratory infection. Bronchitis, bronchiolitis with severe cystic bronchiectasis worse in the bilateral lower lobes with bilateral lung base pneumonia and bilateral lower lobe cysts with air-fluid levels. In addition at risk for pseudomonas infection and also concern for atypical mycobacterial infection. IGG subclasses- normal. Sputum eosinophilia +. Discharged with VEST - ordered per Dr. Saira Keller. Outpatient testing- PFT, 6 min walk, Polysomnogram. follow up CT scan in 12 weeks. Op f/u with pulm in 3 weeks. 7. Falls at home, ambulatory dysfunction. SNF today for subacute rehab. 8. pelvic ring injury - non operative treatment per ortho. PT: Toe Touch Weight bearing RLE per ortho. lovenox x 4 weeks for dvt prophylaxis  9.  Echo 9/28/19 EF 60%, LV moderate concentric hypertrophy, no RWMA, grade 1 DD. Mild TR. Mild - mod pulmonary hypertension. Mild AR. 10. Single positive blood culture for staph species 9/26 likely contaminant per ID - repeat blood cx (9/28) ngtd. 11. Difficulty caring for himself at home. Has resided w/ his Mom for 67 years, mom recently went to [facility] rehab, family interested in them having shared room (per CM [facility] can accomodate). 12. mod pharyngeal dysphagia c/b silent penetration to laryngeal vestibule with thin liquid - s/p MBS - SLP recs noted below in diet order. will require ST services upon DC from this facility. 13. Underweight Body mass index is 16.42 kg/(m^2). On supplements per nutritionist. multivit  14. Hypophosphatemia repleted. 15. Pulmonary HTN secondary to above- group 3 - in the setting of #2 and #6. 16. Severe Protein Calorie Malnutrition AEB   ASPEN Malnutrition Criteria  Acute Illness, Chronic Illness, or Social/Enviornmental: Chronic illness  Energy Intake: Less than/equal to 75% of est energy req for greater than/equal to 1 month  Muscle Mass: Severe (Clavicle, scapular, patellar & anterior thigh regions)  ASPEN Malnutrition Score - Chronic Illness: 12.   --> nutritionist following. On multivit. Supplements modified per nutritionist: Ensure Enlive, TID. 17. DNR this admission. No durable DNR on file at the time of this dictation- family discussion underway. Discharge to SNF. Patient and family agree; all questions answered to the best of my ability.      Brother/ NOK Mike Hodgkin [phone]    Consults: Infectious Disease Dr Jordana Craven Diagnostic Studies: labs:     Ref.  Range 10/4/2018 01:58   WBC Latest Ref Range: 4.6 - 13.2 K/uL 7.1   RBC Latest Ref Range: 4.70 - 5.50 M/uL 3.43 (L)   HGB Latest Ref Range: 13.0 - 16.0 g/dL 11.0 (L)   HCT Latest Ref Range: 36.0 - 48.0 % 33.7 (L)   MCV Latest Ref Range: 74.0 - 97.0 FL 98.3 (H)   MCH Latest Ref Range: 24.0 - 34.0 PG 32.1   MCHC Latest Ref Range: 31.0 - 37.0 g/dL 32.6   RDW Latest Ref Range: 11.6 - 14.5 % 13.2   PLATELET Latest Ref Range: 135 - 420 K/uL 141   MPV Latest Ref Range: 9.2 - 11.8 FL 10.6   NEUTROPHILS Latest Ref Range: 40 - 73 % 79 (H)   LYMPHOCYTES Latest Ref Range: 21 - 52 % 11 (L)   MONOCYTES Latest Ref Range: 3 - 10 % 7   EOSINOPHILS Latest Ref Range: 0 - 5 % 3   BASOPHILS Latest Ref Range: 0 - 2 % 0   DF Latest Units:   AUTOMATED   ABS. NEUTROPHILS Latest Ref Range: 1.8 - 8.0 K/UL 5.6   ABS. LYMPHOCYTES Latest Ref Range: 0.9 - 3.6 K/UL 0.8 (L)   ABS. MONOCYTES Latest Ref Range: 0.05 - 1.2 K/UL 0.5   ABS. EOSINOPHILS Latest Ref Range: 0.0 - 0.4 K/UL 0.2   ABS.  BASOPHILS Latest Ref Range: 0.0 - 0.1 K/UL 0.0   Sodium Latest Ref Range: 136 - 145 mmol/L 138   Potassium Latest Ref Range: 3.5 - 5.5 mmol/L 4.0   Chloride Latest Ref Range: 100 - 108 mmol/L 99 (L)   CO2 Latest Ref Range: 21 - 32 mmol/L 31   Anion gap Latest Ref Range: 3.0 - 18 mmol/L 8   Glucose Latest Ref Range: 74 - 99 mg/dL 73 (L)   BUN Latest Ref Range: 7.0 - 18 MG/DL 7   Creatinine Latest Ref Range: 0.6 - 1.3 MG/DL 0.38 (L)   BUN/Creatinine ratio Latest Ref Range: 12 - 20   18   Calcium Latest Ref Range: 8.5 - 10.1 MG/DL 7.0 (L)   Phosphorus Latest Ref Range: 2.5 - 4.9 MG/DL 3.3   Magnesium Latest Ref Range: 1.6 - 2.6 mg/dL 1.9   GFR est non-AA Latest Ref Range: >60 ml/min/1.73m2 >60   GFR est AA Latest Ref Range: >60 ml/min/1.73m2 >60     All Micro Results     Procedure Component Value Units Date/Time    CULTURE, RESPIRATORY/SPUTUM/BRONCH Trenia Dre STAIN [[id number]]  (Abnormal) Collected:  10/03/18 1150    Order Status:  Completed Specimen:  Sputum from Sputum Updated:  10/05/18 0832     Special Requests: NO SPECIAL REQUESTS        GRAM STAIN >25 WBC/lpf         <10 EPI/lpf         MUCUS PRESENT         RARE YEAST        Culture result: FEW CANDIDA ALBICANS (A)                 FEW NORMAL RESPIRATORY SHOSHANA    CULTURE, BLOOD [[id number]] Collected:  09/28/18 1355    Order Status:  Completed Specimen:  Whole Blood from Blood Updated:  10/04/18 0634     Special Requests: NO SPECIAL REQUESTS        Culture result: NO GROWTH 6 DAYS       CULTURE, BLOOD [[id number]] Collected:  09/28/18 1345    Order Status:  Completed Specimen:  Whole Blood from Blood Updated:  10/04/18 0634     Special Requests: NO SPECIAL REQUESTS        Culture result: NO GROWTH 6 DAYS       CULTURE, BLOOD [[id number]] Collected:  09/28/18 0547    Order Status:  Completed Specimen:  Whole Blood from Blood Updated:  10/04/18 0633     Special Requests: NO SPECIAL REQUESTS        Culture result: NO GROWTH 6 DAYS       CULTURE, BLOOD [[id number]] Collected:  09/28/18 0426    Order Status:  Completed Specimen:  Whole Blood from Blood Updated:  10/04/18 0633     Special Requests: NO SPECIAL REQUESTS        Culture result: NO GROWTH 6 DAYS       CULTURE, BLOOD [[id number]] Collected:  09/26/18 2201    Order Status:  Completed Specimen:  Blood from Blood Updated:  10/02/18 0810     Special Requests: NO SPECIAL REQUESTS        Culture result: NO GROWTH 6 DAYS       CULTURE, BLOOD [[id number]]  (Abnormal)  (Susceptibility) Collected:  09/26/18 2145    Order Status:  Completed Specimen:  Blood from Blood Updated:  09/29/18 1306     Special Requests: NO SPECIAL REQUESTS        GRAM STAIN         AEROBIC AND ANAEROBIC BOTTLES GRAM POSITIVE COCCI IN GROUPS              SMEAR CALLED TO AND CORRECTLY REPEATED BY: BAUDILIO Pena RN 2S AT [id number] [id number]. Culture result:         AEROBIC AND ANAEROBIC BOTTLES STAPHYLOCOCCUS HAEMOLYTICUS (A)        IMAGING  XR Results (most recent):    Results from Hospital Encounter encounter on 09/26/18   XR SWALLOW FUNC VIDEO   Narrative EXAM:  Modified Barium Swallow (Video Barium Swallow). CPT  [id number]    CLINICAL INDICATION:     - Bronchitis, family with reports of coughing with PO, pt with reports of  difficulty swallowing.    - Bedside exam with mild pharyngeal dysphagia.   Weak laryngeal elevation to  palpation with all tested bedside boluses. Full MBS felt to be needed by the  bedside exam to rule out occult silent aspiration. COMPARISON:  None. TECHNIQUE:      - This study was performed in conjunction with speech pathologist with the  patient in lateral upright position.    - Tested consistencies: Thin (via cup/straw), nectar (via cup), puree, solid,  and tablet challenge utilizing nectar consistency wash. - Fluoroscopy duration:  1 minute 42 seconds. - Exposures:  7 cinefluoroscopy loops. FINDINGS:    There was moderate penetration on thin consistency boluses through both the cup  and straw. No definite penetration or aspiration was noted on other tested  boluses. Vallecular and piriform sinus residuals were observed with all tested  consistencies. Impression IMPRESSION:    1. Moderate penetration on thin liquids with both cup and straw. No collin  aspiration or penetration on other tested consistencies. 2.  Vallecular and piriform sinus residuals with all tested boluses. Thank you for your referral.              CT Results (most recent):    Results from [place] encounter on 09/26/18   CT PELV WO CONT   Narrative CT pelvis without IV contrast    HISTORY: Patient fell. Pain. Reference: Radiographs September 27, 2018    All CT scans at this facility are performed using dose optimization technique as  appropriate to a performed exam, to include automated exposure control,  adjustment of the mA and/or kV according to patient size (including appropriate  matching for site specific examination) or use of iterative reconstruction  technique. Axial CT images were obtained. Sagittal and coronal images were reformatted. Osteoporosis. Slightly displaced oblique fracture in the right ilium, from the mid iliac crest  extending to slightly above the acetabular roof.  Suspect mild fracture at the  lateral margin of the sacrum, at the sacroiliac joint on the right and also on  the left. Subacute or old fracture in the right inferior pubic ramus. Pubic symphysis is  intact. No definite displaced fracture in the left pubic rami. Advanced degenerative joint disease in both hips with prominent osteophytes. No  acute femoral neck fracture or hip dislocation. No hip joint effusion. No internal pelvic hematoma. Mild residual high density within the bladder,  likely from IV contrast received on September 26, 2018. Prostate is slightly  enlarged with central calcification. Moderately severe fecal retention in the  distended rectosigmoid. No pelvic inflammation. Normal appendix. No free fluid. Impression IMPRESSION:    1. Acute, slightly displaced right iliac fracture. Also suspect subtle acute  fracture in the sacral side of the sacroiliac joints bilaterally. 2. Subacute or old right inferior pubic ramus fracture. 3. Additional findings as described. XR FEMUR LT 2 V 9/27/2018  1. No acute fracture or subluxation. 2.  Osteoarthrosis of the left hip. XR PELV 1 OR 2 V 9/27/2018  1. Questionable subtle curvilinear lucencies in the right superior and inferior pubic rami. Potentially suspicious for pelvic fracture. 2.  Moderate to severe osteoarthrosis in both hip joints. 3.  Questionable curvilinear lucency involving the left sacral ala. 4.  Recommend CT evaluation of the pelvis for further delineation. CTA CHEST W OR W WO CONT 9/26/2018  1. No evidence for pulmonary emboli. 2.  Bronchitis, bronchiolitis with severe cystic bronchiectasis worse in the bilateral lower lobes with bilateral lung base pneumonia and bilateral lower  lobe cysts with air-fluid levels. 3.  Asbestos related pleural disease. 4.  Right atrial and right ventricle enlargement. 5.  Aberrant right subclavian artery. XR CHEST PORT 9/26/2018   Coarsened reticular lung markings suggests underlying chronic lung disease.  There is concern there may be a superimposed acute process such as infection. Interstitial edema felt less likely. CT HEAD WO CONT 9/26/2018  No definite CT finding for an acute intracranial process. Moderate global volume loss. Identification of a nonspecific 6 mm hyperdense nodule at the anterior and  superior aspect of the third ventricle. There is no associated obstruction with this finding. It is incidental and of uncertain clinical significance. Could consider a nonemergent MRI head for further characterization. EKG Results     Procedure 720 Value Units Date/Time    SCANNED CARDIAC RHYTHM STRIP [[id number]] Collected:  10/01/18 1216    Order Status:  Completed Updated:  10/01/18 1250    SCANNED [address] [[id number]] Collected:  10/01/18 0733    Order Status:  Completed Updated:  10/01/18 1041    EKG, 12 LEAD, INITIAL [[id number]] Collected:  09/26/18 1944    Order Status:  Completed Updated:  09/27/18 0750     Ventricular Rate 118 BPM      Atrial Rate 118 BPM      P-R Interval 96 ms      QRS Duration 104 ms      Q-T Interval 302 ms      QTC Calculation (Bezet) 423 ms      Calculated P Axis 25 degrees      Calculated R Axis 39 degrees      Calculated T Axis -20 degrees      Diagnosis --     Sinus tachycardia with short ME  Incomplete right bundle branch block  Borderline ECG  When compared with ECG of 04-APR-2011 10:26,  Incomplete right bundle branch block is now present  Confirmed by Teresa Li MD, Rozina Friedman ([id number]) on 9/27/2018 7:50:27 AM          Echo Adult Complete 9/28/18  · Calculated left ventricular ejection fraction is 60%. Visually measured ejection fraction. Left ventricular cavity size is dilated. Left ventricular moderate concentric hypertrophy. Normal left ventricular wall motion, no regional wall motion abnormality noted. Mild (grade 1) left ventricular diastolic dysfunction. · Mild tricuspid valve regurgitation is present. Pulmonary arterial systolic pressure is 48 mmHg. Mild to moderate pulmonary hypertension is present.   · Mitral valve non-specific thickening. Mitral annular calcification. · Mild aortic valve regurgitation is present. Discharge Medications:     Current Discharge Medication List      START taking these medications    Details   acetaminophen (TYLENOL) 500 mg tablet Take 1 Tab by mouth every six (6) hours as needed. Qty: 25 Tab, Refills: 1      albuterol-ipratropium (DUO-NEB) 2.5 mg-0.5 mg/3 ml nebu 3 mL by Nebulization route every six (6) hours as needed. Qty: 100 Nebule, Refills: 4      cholestyramine-aspartame (QUESTRAN LIGHT) 4 gram packet Take 1 Packet by mouth three (3) times daily (with meals) for 14 days. Qty: 42 Packet, Refills: 0      diphenhydrAMINE (BENADRYL) 25 mg capsule Take 1 Cap by mouth every six (6) hours as needed for up to 10 days. Qty: 20 Cap, Refills: 0      enoxaparin (LOVENOX) 40 mg/0.4 mL 0.4 mL by SubCUTAneous route every twenty-four (24) hours for 21 days. Qty: 21 Syringe, Refills: 0      guaiFENesin ER (MUCINEX) 600 mg ER tablet Take 1 Tab by mouth every twelve (12) hours for 7 days. Qty: 14 Tab, Refills: 0      lactobacillus sp. 50 billion cpu (BIO-K PLUS) 50 billion cell -375 mg cap capsule Take 1 Cap by mouth daily for 35 days. Qty: 30 Cap, Refills: 1      therapeutic multivitamin (THERAGRAN) tablet Take 1 Tab by mouth daily. Qty: 30 Tab, Refills: 11      zinc oxide 20 % ointment Apply 1 g to affected area three (3) times daily. Qty: 30 g, Refills: 0      amoxicillin-clavulanate (AUGMENTIN) 875-125 mg per tablet Take 1 Tab by mouth two (2) times a day for 30 days. Qty: 60 Tab, Refills: 0             Activity: toe touch weight bearing RLE per ortho. FALL PRECAUTIONS. Diet: Reg solid with nectar-thick liquids. INCLUDE HS SNACK  Aspiration precautions  HOB >45 during po intake, remain >30 for 30-45 minutes after po   Small bites/sips; alternate liquid/solid with slow feeding rate   Oral care TID  Meds per pt preference  ENSURE ENLIVE with breakfast, lunch and dinner.      Wound Care: zinc oxide 20% ointment as a skin barrier from stool incontinence:  Apply THIN LAYER to entire red area across buttocks & perirectal area TID. Nursing, document site in comments    Follow-up:   1. SNF doctor upon arrival  2.  Dr. Boroke Mcgovern 3 weeks    Minutes spent on discharge: greater than 30

## 2018-10-05 NOTE — PROGRESS NOTES
As per Dr. Lila Bentley, pt is ready for discharge. Called [facility] [address] [facility] to start auth for placement. 1330: Informed pt's brother Niyah Lovett and nursing we are waiting to get auth from insurance. 1520:  Called [facility] of [id number] [address] and she states she does not have auth yet.

## 2018-10-05 NOTE — PROGRESS NOTES
Problem: Mobility Impaired (Adult and Pediatric) Goal: *Acute Goals and Plan of Care (Insert Text) Physical Therapy Goals Initiated 9/29/2018,  Reevaluated 10/5/18, and to be accomplished within 7 day(s) 1. Patient will move from supine to sit and sit to supine , scoot up and down and roll side to side in bed with supervision/set-up. 2.  Patient will transfer from bed to chair and chair to bed with minimal assistance using the least restrictive device possible sliding board. 3.  Patient will perform sit to stand with moderate assist maintaining WB status. Outcome: Progressing Towards Goal 
physical Therapy RE-EVALUATION and TREATMENT Patient: Margoth Mullen (35 y.o. male) Date: 10/5/2018 Diagnosis: Acute bronchitis with COPD ([address] SOB (shortness of breath) Hypoxia Community acquired pneumonia Precautions: Fall, Aspiration, Skin, TTWB (R LE) ASSESSMENT: 
Patient is cleared by nursing for PT reevaluation, and patient consents to therapy. Pt supine in bed. Provided education on importance of therapy and need for progression for skilled PT as well as working on function. Pt wanted to work on mostly bed level exercises but agreed to work on function after education. Pt required motivation and education throughout therapy to increase participation. Pt performed supine to sit with HOB raised SBA. Scooting along EOB CGA/SBA in preperation for possible sliding board transfer. Pt educated on TTWB R LE with pt having more pain on L LE and unable to maintain weight bearing status with sit to stands even with max Ax2 with 2 attempts. Pt will benefit from sliding board transfer training next visit. Performed sit to supine with min A. Pt ended therapy supine in bed with all needs met. Educated on pt performing most mobility on his own and only having assistance as needed from nursing.   
 
Patient's progression toward goals since last assessment: Pt required more assistance for 2 treatment sessions with pt performing less mobility. Today pt had better participation and required less assistance with all mobility. Pt attempted standing 2 times but unable to maintain WB status. Will start sliding board transfers next visit. Updated goals. PLAN: 
Goals have been reviewed and updated based on progression since last assessment. Patient continues to benefit from skilled intervention to address the above impairments to increase functional independence. Continue to follow the patient 1-2 times per day/4-7 days per week to address goals. Planned Interventions: 
[x]     Bed Mobility Training          [x]     Neuromuscular Re-Education 
[x]     Transfer Training                []    Orthotic/Prosthetic Training 
[x]     Gait Training                       [x]     Modalities [x]     Therapeutic Exercises       [x]     Edema Management/Control 
[x]     Therapeutic Activities         [x]     Patient and Family Training/Education 
[]     Other (comment): 
Discharge Recommendations: Joel Roberts Further Equipment Recommendations for Discharge: N/A  
 
SUBJECTIVE:  
Patient stated Did I do good today?  OBJECTIVE DATA SUMMARY:  
Critical Behavior: 
Neurologic State: Alert Orientation Level: Oriented X4 Cognition: Follows commands Safety/Judgement: Fall prevention Functional Mobility Training: 
Bed Mobility: 
Supine to Sit: Stand-by assistance (with HOB raised) Sit to Supine: Minimum assistance Scooting: Contact guard assistance (sitting EOB) Transfers: 
Sit to Stand: Maximum assistance;Assist x2 (unable ) Balance: 
Sitting: Impaired; With support Sitting - Static: Good (unsupported) Sitting - Dynamic: Fair (occasional) Standing: Impaired; With support Standing - Static: Poor Ambulation/Gait Training: 
 non-ambulatory at this time Therapeutic Exercises:  
Performed ankle pumps, LAQ, and marching x 10 reps. Limited L LE marching and LAQ. Pain: 
Pre: none During: moderate L LE Post: mild L LE Activity Tolerance:  
fair Please refer to the flowsheet for vital signs taken during this treatment. After treatment:  
[]  Patient left in no apparent distress sitting up in chair 
[x]  Patient left in no apparent distress in bed 
[x]  Call bell left within reach [x]  Nursing notified, Callie Lynch (informed of pt's L LE pain and limiting mobility) [x]  Caregiver present 
[]  Bed alarm activated 
[x] Personal items in reach Kelli Goldberg, PT, DPT Time Calculation: 23 mins Mobility [id number] Current  CM= 80-99%   Goal  CK= 40-59%. The severity rating is based on the Level of Assistance required for Functional Mobility and ADLs.

## 2018-10-05 NOTE — ROUTINE PROCESS
Bedside report given to Lowell Bright RNs, report  Reviewed SBAR, meds, labs and summary of care. Patient quietly resting, no distress, denies pain.

## 2018-10-05 NOTE — PROGRESS NOTES
Infectious Disease progress Note Requested by: dr. Mika Diaz Reason: gram positive bacteremia, pneumonia Current abx Prior abx Amp/sulbactam since 9/28 Levofloxacin 9/26-9/27 
azithromycin 9/27- 9/30 
ceftriaxone 9/27-9/28 Lines:  
 
 
Assessment : 
68 y.o. male with no known significant past medical history presented to ed on 9/26/18 with generalized weakness, inability to get out of his chair. CT chest: Bronchitis, bronchiolitis with severe cystic bronchiectasis worse in the bilateral lower lobes with bilateral lung base pneumonia and bilateral lower lobe cysts with air-fluid levels. Clinical picture consistent with bilateral pneumonia superimposed on underlying bronchiectasis, chronic lung disease. Sputum cx 10/3 - few candida albicans, few normal respiratory alissa. 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 Swallow evaluation results indicated risk for aspiration. Single positive blood culture for staph species 9/26 likely contaminant. Rash on face/LE: likely drug rash - unable to determine which med. Will monitor for abx allergy Recommendations: 
 
1. D/c amp/sulbactam. Start po amoxicillin/clavulanate for 4 weeks 2. F/u with pulmonary after 3 weeks 3. Repeat ct scan after 3-4 weeks per pulmonary 4. Benadryl prn.  
5. Ok to d/c patient from ID standpoint Advance Care planning: DNR: discussed  with patient/surrogate decision maker:Josseline Del Valle: [phone] Above plan was discussed in details with patient. Please call me if any further questions or concerns. Will continue to participate in the care of this patient. subjective: 
 
Feels better. Denies chest pain, increased cough, chills, fever. Patient denies headaches, visual disturbances, sore throat,earaches,  abdominal pain, diarrhea, burning micturition, pain or weakness in extremities. He denies back pain/flank pain. There are no discharge medications for this patient. Current Facility-Administered Medications Medication Dose Route Frequency  zinc oxide 20 % ointment   Topical TID  diphenhydrAMINE (BENADRYL) capsule 25 mg  25 mg Oral Q6H PRN  
 sodium chloride 10% 15 ml hypertonic neb solution  5 mL Nebulization BID RT  
 albuterol-ipratropium (DUO-NEB) 2.5 MG-0.5 MG/3 ML  3 mL Nebulization QID RT  
 cholestyramine-aspartame (QUESTRAN LIGHT) packet 4 g  4 g Oral TID WITH MEALS  
 acetaminophen (TYLENOL) tablet 500 mg  500 mg Oral Q6H PRN  
 lactobacillus sp. 50 billion cpu (BIO-K PLUS) capsule 1 Cap  1 Cap Oral DAILY  ampicillin-sulbactam (UNASYN) 3 g in 0.9% sodium chloride (MBP/ADV) 100 mL MBP  3 g IntraVENous Q6H  
 enoxaparin (LOVENOX) injection 40 mg  40 mg SubCUTAneous Q24H  
 ondansetron (ZOFRAN ODT) tablet 4 mg  4 mg Oral Q6H PRN  
 famotidine (PEPCID) tablet 20 mg  20 mg Oral BID  therapeutic multivitamin (THERAGRAN) tablet 1 Tab  1 Tab Oral DAILY  guaiFENesin ER (MUCINEX) tablet 600 mg  600 mg Oral Q12H Allergies: Cat dander and Pollen extracts Temp (24hrs), Av.6 °F (36.4 °C), Min:97 °F (36.1 °C), Max:98.3 °F (36.8 °C) Visit Vitals  /76 (BP 1 Location: Right arm, BP Patient Position: At rest)  Pulse 76  Temp 97.8 °F (36.6 °C)  Resp 20  
 Ht 5' 11\" (1.803 m)  Wt 53 kg (116 lb 12.8 oz)  SpO2 100%  BMI 16.29 kg/m2 ROS: 12 point ROS obtained in details. Pertinent positives as mentioned in HPI,  
otherwise negative Physical Exam: 
 
 
Constitutional: He is oriented to person, place, and time. He appears well-nourished. Frail, elderly HENT:  
Head: Normocephalic and atraumatic. Eyes: EOM are normal. Pupils are equal, round, and reactive to light. Neck: Normal range of motion. Neck supple. Cardiovascular: Normal rate, regular rhythm and normal heart sounds. Exam reveals no friction rub. No murmur heard. Pulmonary/Chest: Effort normal. No respiratory distress.  Bilateral LL rales, occasional wheezes Abdominal: Soft. He exhibits no distension. There is no tenderness. There is no rebound and no guarding. Musculoskeletal: Normal range of motion. Neurological: He is alert and oriented to person, place, and time. Skin: Skin is warm and dry. Psychiatric: He has a normal mood and affect. His behavior is normal. Thought content normal.  
  
 
Labs: Results:  
Chemistry Recent Labs 10/04/18 
 0158 GLU  73* NA  138  
K  4.0  
CL  99* CO2  31 BUN  7  
CREA  0.38* CA  7.0* AGAP  8  
BUCR  18 CBC w/Diff Recent Labs 10/04/18 
 0158 WBC  7.1 RBC  3.43* HGB  11.0*  
HCT  33.7*  
PLT  141 GRANS  79* LYMPH  11* EOS  3 Microbiology Recent Labs 10/03/18 
 1150 CULT  FEW CANDIDA ALBICANS*  FEW NORMAL RESPIRATORY SHOSHANA  
  
 
 
RADIOLOGY: 
 
All available imaging studies/reports in [facility] for this admission were reviewed Dr. Magaly Buitrago, Infectious Disease Specialist 
[phone] October 5, 2018 
1:31 PM

## 2018-10-05 NOTE — PROGRESS NOTES
Patient will be discharged to SNF with antibiotics and Vest Percussion. Orders for DME placed. VEST airway clearance system 2 treatments of 20 min each daily at frequency of 6-15 for at least 10 min Diagnosis: Severe bronchiectasis Will follow up in clinic in 3 weeks.

## 2018-10-06 LAB
GLUCOSE BLD STRIP.AUTO-MCNC: 51 MG/DL (ref 70–110)
GLUCOSE BLD STRIP.AUTO-MCNC: 60 MG/DL (ref 70–110)
GLUCOSE BLD STRIP.AUTO-MCNC: 68 MG/DL (ref 70–110)
GLUCOSE BLD STRIP.AUTO-MCNC: 81 MG/DL (ref 70–110)

## 2018-10-06 PROCEDURE — 74011000258 HC RX REV CODE- 258: Performed by: INTERNAL MEDICINE

## 2018-10-06 PROCEDURE — 77010033678 HC OXYGEN DAILY

## 2018-10-06 PROCEDURE — 74011250637 HC RX REV CODE- 250/637: Performed by: INTERNAL MEDICINE

## 2018-10-06 PROCEDURE — 65660000000 HC RM CCU STEPDOWN

## 2018-10-06 PROCEDURE — 82962 GLUCOSE BLOOD TEST: CPT

## 2018-10-06 PROCEDURE — 74011250636 HC RX REV CODE- 250/636: Performed by: HOSPITALIST

## 2018-10-06 PROCEDURE — 74011250637 HC RX REV CODE- 250/637: Performed by: HOSPITALIST

## 2018-10-06 PROCEDURE — 74011250636 HC RX REV CODE- 250/636: Performed by: INTERNAL MEDICINE

## 2018-10-06 PROCEDURE — 94640 AIRWAY INHALATION TREATMENT: CPT

## 2018-10-06 PROCEDURE — 74011000250 HC RX REV CODE- 250: Performed by: INTERNAL MEDICINE

## 2018-10-06 RX ADMIN — ZINC OXIDE: 200 OINTMENT TOPICAL at 22:00

## 2018-10-06 RX ADMIN — ACETAMINOPHEN 500 MG: 500 TABLET, FILM COATED ORAL at 20:21

## 2018-10-06 RX ADMIN — CHOLESTYRAMINE 4 G: 4 POWDER, FOR SUSPENSION ORAL at 08:52

## 2018-10-06 RX ADMIN — Medication 1 CAPSULE: at 08:52

## 2018-10-06 RX ADMIN — ZINC OXIDE: 200 OINTMENT TOPICAL at 19:14

## 2018-10-06 RX ADMIN — GUAIFENESIN 600 MG: 600 TABLET, EXTENDED RELEASE ORAL at 08:52

## 2018-10-06 RX ADMIN — AMPICILLIN SODIUM AND SULBACTAM SODIUM 3 G: 2; 1 INJECTION, POWDER, FOR SOLUTION INTRAMUSCULAR; INTRAVENOUS at 19:12

## 2018-10-06 RX ADMIN — FAMOTIDINE 20 MG: 20 TABLET ORAL at 19:14

## 2018-10-06 RX ADMIN — CHOLESTYRAMINE 4 G: 4 POWDER, FOR SUSPENSION ORAL at 19:13

## 2018-10-06 RX ADMIN — AMPICILLIN SODIUM AND SULBACTAM SODIUM 3 G: 2; 1 INJECTION, POWDER, FOR SOLUTION INTRAMUSCULAR; INTRAVENOUS at 06:01

## 2018-10-06 RX ADMIN — GUAIFENESIN 600 MG: 600 TABLET, EXTENDED RELEASE ORAL at 20:20

## 2018-10-06 RX ADMIN — CHOLESTYRAMINE 4 G: 4 POWDER, FOR SUSPENSION ORAL at 13:07

## 2018-10-06 RX ADMIN — THERA TABS 1 TABLET: TAB at 08:52

## 2018-10-06 RX ADMIN — FAMOTIDINE 20 MG: 20 TABLET ORAL at 08:52

## 2018-10-06 RX ADMIN — ZINC OXIDE: 200 OINTMENT TOPICAL at 08:52

## 2018-10-06 RX ADMIN — AMPICILLIN SODIUM AND SULBACTAM SODIUM 3 G: 2; 1 INJECTION, POWDER, FOR SOLUTION INTRAMUSCULAR; INTRAVENOUS at 13:05

## 2018-10-06 RX ADMIN — IPRATROPIUM BROMIDE AND ALBUTEROL SULFATE 3 ML: .5; 3 SOLUTION RESPIRATORY (INHALATION) at 20:49

## 2018-10-06 RX ADMIN — ENOXAPARIN SODIUM 40 MG: 100 INJECTION SUBCUTANEOUS at 19:13

## 2018-10-06 RX ADMIN — IPRATROPIUM BROMIDE AND ALBUTEROL SULFATE 3 ML: .5; 3 SOLUTION RESPIRATORY (INHALATION) at 15:20

## 2018-10-06 RX ADMIN — IPRATROPIUM BROMIDE AND ALBUTEROL SULFATE 3 ML: .5; 3 SOLUTION RESPIRATORY (INHALATION) at 07:57

## 2018-10-06 NOTE — ROUTINE PROCESS
Bedside, Verbal and Written shift change report given to [address] (oncoming nurse) by Ayala Barksdale RN (offgoing nurse). Report included the following information SBAR, Kardex, MAR and Accordion.

## 2018-10-06 NOTE — PROGRESS NOTES
Hospitalist Progress Note Patient: Narcisa Johnson MRN: [id number]  CSN: [id number] YOB: 1945  Age: 68 y.o. Sex: male DOA: 9/26/2018 LOS:  LOS: 10 days     
 
auth not obtained yesterday for xfer. Patient states he feels okay, but still with difficulty expectorating, and poor appetite. He denies constipation, states meds alter his taste sensation. Assessment/Plan 1. Hypoxia 82% on RA in ED. 
2. B.l pna superimposed on underlying bronchiectasis, chronic lung disease. unasyn per ID. 3. Severe sepsis poa (tachycardia, hypoxia) with evidence of end organ damage (lactic acidosis, elevated trop) - source m/l lungs 4. Lactic acidosis - resolved. 5. Elevated trop - flat, not c/w ACS. Echo noted. 6. Chronic bronchiectasis and cystic lung disease with superinfection causing air fluid levels and micro- macroabscess formation and suppurative lower respiratory infection. Bronchitis, bronchiolitis with severe cystic bronchiectasis worse in the bilateral lower lobes with bilateral lung base pneumonia and bilateral lower lobe cysts with air-fluid levels. pulm input appreciated. 7. Falls at home, ambulatory dysfunction. 8. pelvic ring injury - non operative treatment per ortho. PT: Toe Touch Weight bearing RLE per ortho. lovenox x 4 weeks for dvt prophylaxis 9. Echo 9/28/19 EF 60%, LV moderate concentric hypertrophy, no RWMA, grade 1 DD. Mild TR. Mild - mod pulmonary hypertension. Mild AR. 10. 2/4 blood culture + staph hemolyticus likely contaminant - repeat blood cx (9/28) ngtd - ID input appreciated 11. Difficulty caring for himself at home. Has resided w/ his Mom for 67 years, mom recently went to [facility] rehab, family interested in them having shared room (per CM [facility] can accomodate). 12. mod pharyngeal dysphagia c/b silent penetration to laryngeal vestibule with thin liquid - s/p MBS - SLP recs noted. will require ST services upon DC from this facility. 13. Underweight Body mass index is 16.28 kg/(m^2). On supplements per nutritionist. multivit 14. Hypophosphatemia replete as needed. 15. DNR. No durable DNR on file - family to discuss. brother Delonte Mg [phone] Additional Notes:   
 
Case discussed with:  [x]Patient  []Family  [x]Nursing  []Case Management DVT Prophylaxis:  []Lovenox  [x]Hep SQ  []SCDs  []Coumadin   []On Heparin gtt Vital signs/Intake and Output: 
Visit Vitals  /69 (BP 1 Location: Right arm, BP Patient Position: At rest)  Pulse (!) 110  Temp 97.6 °F (36.4 °C)  Resp 19  
 Ht 5' 11\" (1.803 m)  Wt 52.9 kg (116 lb 11.2 oz)  SpO2 95%  BMI 16.28 kg/m2 Current Shift:    
Last three shifts:  10/04 1901 - 10/06 0700 In: 1150 [P.O.:100; I.V.:200] Out: 1250 [DWCKF:1890] Thin, generally weak, awake and alert. Ncat. Perrl. Poor dentition, poor oral hygiene RRR Rhonchi to both bases, symmetrical chest expansion. abd soft nt nd nabs No edema. dp 2+ b.l No focal deficit. Generalized weakness No rash Medications Reviewed Labs: Results:  
   
Chemistry Recent Labs 10/04/18 
 0158 GLU  73* NA  138  
K  4.0  
CL  99* CO2  31 BUN  7  
CREA  0.38* CA  7.0* AGAP  8  
BUCR  18 CBC w/Diff Recent Labs 10/04/18 
 0158 WBC  7.1 RBC  3.43* HGB  11.0*  
HCT  33.7*  
PLT  141 GRANS  79* LYMPH  11* EOS  3 Cardiac Enzymes No results for input(s): CPK, CKND1, SUMMER in the last 72 hours. No lab exists for component: Ivette Joshi Coagulation No results for input(s): PTP, INR, APTT in the last 72 hours. No lab exists for component: INREXT, INREXT Lipid Panel Lab Results Component Value Date/Time  Cholesterol, total 112 04/04/2011 02:17 AM  
 HDL Cholesterol <10 (L) 04/04/2011 02:17 AM  
 LDL, calculated NOT CALCULATED DUE TO LOW HDLC LEVEL 04/04/2011 02:17 AM  
 VLDL, calculated  04/04/2011 02:17 AM  
 Calculation not valid with this patient's other Lipid values. Triglyceride 327 (H) 04/04/2011 02:17 AM  
 CHOL/HDL Ratio NOT CALCULATED DUE TO LOW HDLC LEVEL 04/04/2011 02:17 AM  
  
BNP No results for input(s): BNPP in the last 72 hours. Liver Enzymes No results for input(s): TP, ALB, TBIL, AP, SGOT, GPT in the last 72 hours. No lab exists for component: DBIL Thyroid Studies Lab Results Component Value Date/Time TSH 0.69 09/28/2018 05:47 AM  
    
Procedures/imaging: see electronic medical records for all procedures/Xrays and details which were not copied into this note but were reviewed prior to creation of Plan.

## 2018-10-07 PROCEDURE — 74011250637 HC RX REV CODE- 250/637: Performed by: HOSPITALIST

## 2018-10-07 PROCEDURE — 74011250637 HC RX REV CODE- 250/637: Performed by: INTERNAL MEDICINE

## 2018-10-07 PROCEDURE — 94640 AIRWAY INHALATION TREATMENT: CPT

## 2018-10-07 PROCEDURE — 97530 THERAPEUTIC ACTIVITIES: CPT

## 2018-10-07 PROCEDURE — 74011000258 HC RX REV CODE- 258: Performed by: INTERNAL MEDICINE

## 2018-10-07 PROCEDURE — 74011250636 HC RX REV CODE- 250/636: Performed by: INTERNAL MEDICINE

## 2018-10-07 PROCEDURE — 74011250636 HC RX REV CODE- 250/636: Performed by: HOSPITALIST

## 2018-10-07 PROCEDURE — 74011000250 HC RX REV CODE- 250: Performed by: INTERNAL MEDICINE

## 2018-10-07 PROCEDURE — 65660000000 HC RM CCU STEPDOWN

## 2018-10-07 PROCEDURE — 77010033678 HC OXYGEN DAILY

## 2018-10-07 RX ADMIN — ZINC OXIDE: 200 OINTMENT TOPICAL at 22:00

## 2018-10-07 RX ADMIN — ZINC OXIDE: 200 OINTMENT TOPICAL at 08:53

## 2018-10-07 RX ADMIN — ENOXAPARIN SODIUM 40 MG: 100 INJECTION SUBCUTANEOUS at 16:48

## 2018-10-07 RX ADMIN — THERA TABS 1 TABLET: TAB at 08:52

## 2018-10-07 RX ADMIN — CHOLESTYRAMINE 4 G: 4 POWDER, FOR SUSPENSION ORAL at 16:47

## 2018-10-07 RX ADMIN — FAMOTIDINE 20 MG: 20 TABLET ORAL at 08:52

## 2018-10-07 RX ADMIN — GUAIFENESIN 600 MG: 600 TABLET, EXTENDED RELEASE ORAL at 08:52

## 2018-10-07 RX ADMIN — ACETAMINOPHEN 500 MG: 500 TABLET, FILM COATED ORAL at 18:24

## 2018-10-07 RX ADMIN — AMPICILLIN SODIUM AND SULBACTAM SODIUM 3 G: 2; 1 INJECTION, POWDER, FOR SOLUTION INTRAMUSCULAR; INTRAVENOUS at 23:31

## 2018-10-07 RX ADMIN — Medication 1 CAPSULE: at 08:52

## 2018-10-07 RX ADMIN — ZINC OXIDE: 200 OINTMENT TOPICAL at 16:47

## 2018-10-07 RX ADMIN — IPRATROPIUM BROMIDE AND ALBUTEROL SULFATE 3 ML: .5; 3 SOLUTION RESPIRATORY (INHALATION) at 09:01

## 2018-10-07 RX ADMIN — GUAIFENESIN 600 MG: 600 TABLET, EXTENDED RELEASE ORAL at 21:35

## 2018-10-07 RX ADMIN — AMPICILLIN SODIUM AND SULBACTAM SODIUM 3 G: 2; 1 INJECTION, POWDER, FOR SOLUTION INTRAMUSCULAR; INTRAVENOUS at 17:07

## 2018-10-07 RX ADMIN — AMPICILLIN SODIUM AND SULBACTAM SODIUM 3 G: 2; 1 INJECTION, POWDER, FOR SOLUTION INTRAMUSCULAR; INTRAVENOUS at 06:26

## 2018-10-07 RX ADMIN — AMPICILLIN SODIUM AND SULBACTAM SODIUM 3 G: 2; 1 INJECTION, POWDER, FOR SOLUTION INTRAMUSCULAR; INTRAVENOUS at 01:00

## 2018-10-07 RX ADMIN — FAMOTIDINE 20 MG: 20 TABLET ORAL at 16:47

## 2018-10-07 RX ADMIN — IPRATROPIUM BROMIDE AND ALBUTEROL SULFATE 3 ML: .5; 3 SOLUTION RESPIRATORY (INHALATION) at 19:31

## 2018-10-07 RX ADMIN — AMPICILLIN SODIUM AND SULBACTAM SODIUM 3 G: 2; 1 INJECTION, POWDER, FOR SOLUTION INTRAMUSCULAR; INTRAVENOUS at 12:45

## 2018-10-07 RX ADMIN — CHOLESTYRAMINE 4 G: 4 POWDER, FOR SUSPENSION ORAL at 08:52

## 2018-10-07 RX ADMIN — IPRATROPIUM BROMIDE AND ALBUTEROL SULFATE 3 ML: .5; 3 SOLUTION RESPIRATORY (INHALATION) at 11:53

## 2018-10-07 RX ADMIN — CHOLESTYRAMINE 4 G: 4 POWDER, FOR SUSPENSION ORAL at 12:45

## 2018-10-07 NOTE — ROUTINE PROCESS
Bedside and Verbal shift change [facility] ([facility] by Elizabeth Molina (offgoing nurse). Report included the following information Kardex, Intake/Output and MAR.

## 2018-10-07 NOTE — PROGRESS NOTES
Problem: Pressure Injury - Risk of 
Goal: *Prevention of pressure injury Document Luan Scale and appropriate interventions in the flowsheet. Specialty bed ordered 10-3-18. Outcome: Progressing Towards Goal 
Pressure Injury Interventions: 
Sensory Interventions: Keep linens dry and wrinkle-free, Minimize linen layers Moisture Interventions: Absorbent underpads, Maintain skin hydration (lotion/cream) Activity Interventions: Increase time out of bed, PT/OT evaluation Mobility Interventions: Pressure redistribution bed/mattress (bed type), PT/OT evaluation Nutrition Interventions: Document food/fluid/supplement intake Friction and Shear Interventions: Apply protective barrier, creams and emollients, Foam dressings/transparent film/skin sealants

## 2018-10-07 NOTE — PROGRESS NOTES
Problem: Falls - Risk of 
Goal: *Absence of Falls Document Shawna Mccarthy Fall Risk and appropriate interventions in the flowsheet. Outcome: Progressing Towards Goal 
Fall Risk Interventions: 
Mobility Interventions: PT Consult for mobility concerns Mentation Interventions: Door open when patient unattended Medication Interventions: Patient to call before getting OOB Elimination Interventions: Call light in reach History of Falls Interventions: Room close to nurse's station, Door open when patient unattended

## 2018-10-07 NOTE — PROGRESS NOTES
Problem: Mobility Impaired (Adult and Pediatric) Goal: *Acute Goals and Plan of Care (Insert Text) Physical Therapy Goals Initiated 9/29/2018,  Reevaluated 10/5/18, and to be accomplished within 7 day(s) 1. Patient will move from supine to sit and sit to supine , scoot up and down and roll side to side in bed with supervision/set-up. 2.  Patient will transfer from bed to chair and chair to bed with minimal assistance using the least restrictive device possible sliding board. 3.  Patient will perform sit to stand with moderate assist maintaining WB status. Outcome: Progressing Towards Goal 
physical Therapy TREATMENT Patient: Shad Lombardo (11 y.o. male) Date: 10/7/2018 Diagnosis: Acute bronchitis with COPD ([address] SOB (shortness of breath) Hypoxia Community acquired pneumonia Precautions: Fall, Aspiration, Skin, TTWB (R LE) Chart, physical therapy assessment, plan of care and goals were reviewed. OBJECTIVE/ ASSESSMENT: 
Patient found semi reclined in bed willing to work with PT. Pt transferred to EOB with SBA and tolerated sitting on EOB for close to 12 minutes this tx with max encouragement to continue to stay seated as pt made multiple attempts to lay back down without being cued to do so. Educated pt on importance of increased participation in order to increase activity tolerance. Pt performed seated therex (see below) while on EOB. Pt declined performing slide board transfer to recliner chair despite max encouragement and further education on importance of OOB activity. Pt then transferred back to supine and requested assistance from this LPTA. Pt was able to perform transfer with only CGA for B LE's. Pt was positioned to comfort in bed and left semi reclined with all needs in reach and nursing notified. Education: bed mobility, therex, preparation for transfers, importance of OOB activity Progression toward goals: []      Improving appropriately and progressing toward goals [x]      Improving slowly and progressing toward goals 
[]      Not making progress toward goals and plan of care will be adjusted PLAN: 
Patient continues to benefit from skilled intervention to address the above impairments. Continue treatment per established plan of care. Discharge Recommendations:  Joel Roberts Further Equipment Recommendations for Discharge:  transfer bench SUBJECTIVE:  
Patient stated No no no I don't want to use that thing. I'm not getting in that chair (pt referring to slide board) OBJECTIVE DATA SUMMARY:  
Functional Mobility Training: 
Bed Mobility: 
Supine to Sit: Stand-by assistance (with HOB elevated) Sit to Supine: Contact guard assistance ( B LE's) Scooting: Stand-by assistance Balance: 
Sitting: Impaired; With support Sitting - Static: Good (unsupported) Sitting - Dynamic: Fair (occasional) Therapeutic Exercises:  
 
 
EXERCISE Sets Reps Active Active Assist  
Passive Self- assited ROM Comments Ankle Pumps 1 10  [x] [] [] [] Quad Sets   [] [] [] [] Glut Sets   [] [] [] [] Short Arc Quads   [] [] [] [] Heel Slides   [] [] [] [] Straight Leg Raises   [] [] [] [] Hip Abd   [] [] [] [] Long Arc Quads 1 10 [x] [] [] [] Seated Marching 1 10 [x] [] [] [] Seated Knee Flexion   [] [] [] []   
Standing Marching   [] [] [] []   
 
Pain: 
Pre tx pain: not rated Post tx pain: 4/10 Activity Tolerance:  
Fair- 
Please refer to the flowsheet for vital signs taken during this treatment. After treatment:  
[] Patient left in no apparent distress sitting up in chair 
[x] Patient left in no apparent distress in bed 
[x] Call bell left within reach [x] Nursing notified Tonya Sheikh) 
[] Caregiver present 
[] Bed alarm activated 
[] SCDs applied 
[] Ice applied Skylar Cabello PTA Time Calculation: 23 mins Mobility [id number] Current  CJ= 20-39%. The severity rating is based on the Level of Assistance required for Functional Mobility and ADLs. Mobility   Goal  CK= 40-59%. The severity rating is based on the Level of Assistance required for Functional Mobility and ADLs.

## 2018-10-07 NOTE — PROGRESS NOTES
Hospitalist Progress Note Patient: Victor Manuel Rollins MRN: [id number]  CSN: [id number] YOB: 1945  Age: 68 y.o. Sex: male DOA: 9/26/2018 LOS:  LOS: 11 days I personally saw and evaluated this patient on 10/7/18. Patient in NAD Assessment/Plan 1. Hypoxia 82% on RA in ED. 
2. B.l pna superimposed on underlying bronchiectasis, chronic lung disease. abx per ID 3. Severe sepsis poa (tachycardia, hypoxia) with evidence of end organ damage (lactic acidosis, elevated trop) - source m/l lungs 4. Lactic acidosis - resolved. 5. Elevated trop - flat, not c/w ACS. Echo noted. 6. Chronic bronchiectasis and cystic lung disease with superinfection causing air fluid levels and micro- macroabscess formation and suppurative lower respiratory infection. Bronchitis, bronchiolitis with severe cystic bronchiectasis worse in the bilateral lower lobes with bilateral lung base pneumonia and bilateral lower lobe cysts with air-fluid levels. pulm input appreciated. 7. Falls at home, ambulatory dysfunction. 8. pelvic ring injury - non operative treatment per ortho. PT: Toe Touch Weight bearing RLE per ortho. lovenox x 4 weeks for dvt prophylaxis 9. Echo 9/28/19 EF 60%, LV moderate concentric hypertrophy, no RWMA, grade 1 DD. Mild TR. Mild - mod pulmonary hypertension. Mild AR. 10. 2/4 blood culture + staph hemolyticus likely contaminant - repeat blood cx (9/28) ngtd - ID following 11. Difficulty caring for himself at home. Has resided w/ his Mom for 67 years, mom recently went to [facility] rehab, family interested in them having shared room (per  [facility] can accomodate). 12. mod pharyngeal dysphagia c/b silent penetration to laryngeal vestibule with thin liquid - s/p MBS -diet as per speech 13. Underweight Body mass index is 16.07 kg/(m^2). On supplements per nutritionist. multivit 14. Hypophosphatemia replete as needed. 15. DNR. brother Marilee Savannah [phone] Additional Notes:   
 
Case discussed with:  [x]Patient  []Family  [x]Nursing  []Case Management DVT Prophylaxis:  []Lovenox  [x]Hep SQ  []SCDs  []Coumadin   []On Heparin gtt Vital signs/Intake and Output: 
Visit Vitals  /70 (BP 1 Location: Right arm, BP Patient Position: At rest)  Pulse (!) 107  Temp 97.8 °F (36.6 °C)  Resp 18  Ht 5' 11\" (1.803 m)  Wt 52.3 kg (115 lb 3.2 oz)  SpO2 98%  BMI 16.07 kg/m2 Current Shift:    
Last three shifts:  10/05 1901 - 10/07 0700 In: 1050 [I.V.:200] Out: 325 [Urine:325] General:  Awake Cardiovascular:  S1S2+, RRR Pulmonary:  CTA b/l GI:  Soft, BS+, NT, ND Extremities:  No edema 
frail Medications Reviewed Labs: Results:  
   
Chemistry No results for input(s): GLU, NA, K, CL, CO2, BUN, CREA, CA, AGAP, BUCR, TBIL, GPT, AP, TP, ALB, GLOB, AGRAT in the last 72 hours. CBC w/Diff No results for input(s): WBC, RBC, HGB, HCT, PLT, GRANS, LYMPH, EOS, HGBEXT, HCTEXT, PLTEXT, HGBEXT, HCTEXT, PLTEXT in the last 72 hours. Cardiac Enzymes No results for input(s): CPK, CKND1, SUMMER in the last 72 hours. No lab exists for component: Xi Matthews Coagulation No results for input(s): PTP, INR, APTT in the last 72 hours. No lab exists for component: INREXT, INREXT Lipid Panel Lab Results Component Value Date/Time Cholesterol, total 112 04/04/2011 02:17 AM  
 HDL Cholesterol <10 (L) 04/04/2011 02:17 AM  
 LDL, calculated NOT CALCULATED DUE TO LOW HDLC LEVEL 04/04/2011 02:17 AM  
 VLDL, calculated  04/04/2011 02:17 AM  
  Calculation not valid with this patient's other Lipid values. Triglyceride 327 (H) 04/04/2011 02:17 AM  
 CHOL/HDL Ratio NOT CALCULATED DUE TO LOW HDLC LEVEL 04/04/2011 02:17 AM  
  
BNP No results for input(s): BNPP in the last 72 hours. Liver Enzymes No results for input(s): TP, ALB, TBIL, AP, SGOT, GPT in the last 72 hours. No lab exists for component: DBIL Thyroid Studies Lab Results Component Value Date/Time TSH 0.69 09/28/2018 05:47 AM  
    
Procedures/imaging: see electronic medical records for all procedures/Xrays and details which were not copied into this note but were reviewed prior to creation of Plan.  
 
Abhijeet Dozier MD

## 2018-10-07 NOTE — ROUTINE PROCESS
Report received from [facility]. Patient is alert, medicated for leg pain. Suctioned orally for thick secretions, he has a weak cough. Call bell in reach, continue to monitor. 2130 [address] 3 noted for HR. Patient has no complaint, no distress noted. Patients HR has been in the low 100's. MD is aware. Cont to monitor. 0545 Mews noted HR. Patient resting in bed, no distress noted. 0645 Suctioned orally for thick secretions, minimal amount noted. 0730 Bedside, Verbal and Written shift change report given to Franky Brody RN (oncoming nurse) by Morales Falcon RN (offgoing nurse). Report included the following information SBAR, Kardex, Intake/Output and MAR.

## 2018-10-08 VITALS
BODY MASS INDEX: 15.73 KG/M2 | SYSTOLIC BLOOD PRESSURE: 108 MMHG | HEIGHT: 71 IN | RESPIRATION RATE: 18 BRPM | WEIGHT: 112.4 LBS | TEMPERATURE: 97.5 F | DIASTOLIC BLOOD PRESSURE: 48 MMHG | HEART RATE: 106 BPM | OXYGEN SATURATION: 97 %

## 2018-10-08 LAB
ERYTHROCYTE [DISTWIDTH] IN BLOOD BY AUTOMATED COUNT: 12.7 % (ref 11.6–14.5)
HCT VFR BLD AUTO: 31.6 % (ref 36–48)
HGB BLD-MCNC: 10.5 G/DL (ref 13–16)
MCH RBC QN AUTO: 32.3 PG (ref 24–34)
MCHC RBC AUTO-ENTMCNC: 33.2 G/DL (ref 31–37)
MCV RBC AUTO: 97.2 FL (ref 74–97)
PLATELET # BLD AUTO: 204 K/UL (ref 135–420)
PMV BLD AUTO: 10.5 FL (ref 9.2–11.8)
RBC # BLD AUTO: 3.25 M/UL (ref 4.7–5.5)
WBC # BLD AUTO: 8.3 K/UL (ref 4.6–13.2)

## 2018-10-08 PROCEDURE — 77030037875 HC DRSG MEPILEX <16IN BORD MOLN -A

## 2018-10-08 PROCEDURE — 94640 AIRWAY INHALATION TREATMENT: CPT

## 2018-10-08 PROCEDURE — 36415 COLL VENOUS BLD VENIPUNCTURE: CPT | Performed by: EMERGENCY MEDICINE

## 2018-10-08 PROCEDURE — 74011250636 HC RX REV CODE- 250/636: Performed by: INTERNAL MEDICINE

## 2018-10-08 PROCEDURE — 77030011256 HC DRSG MEPILEX <16IN NO BORD MOLN -A

## 2018-10-08 PROCEDURE — 74011250637 HC RX REV CODE- 250/637: Performed by: HOSPITALIST

## 2018-10-08 PROCEDURE — 74011000258 HC RX REV CODE- 258: Performed by: INTERNAL MEDICINE

## 2018-10-08 PROCEDURE — 74011000250 HC RX REV CODE- 250: Performed by: INTERNAL MEDICINE

## 2018-10-08 PROCEDURE — 74011250637 HC RX REV CODE- 250/637: Performed by: INTERNAL MEDICINE

## 2018-10-08 PROCEDURE — 85027 COMPLETE CBC AUTOMATED: CPT | Performed by: EMERGENCY MEDICINE

## 2018-10-08 RX ORDER — AMOXICILLIN AND CLAVULANATE POTASSIUM 875; 125 MG/1; MG/1
1 TABLET, FILM COATED ORAL EVERY 12 HOURS
Status: DISCONTINUED | OUTPATIENT
Start: 2018-10-08 | End: 2018-10-08 | Stop reason: HOSPADM

## 2018-10-08 RX ORDER — IPRATROPIUM BROMIDE AND ALBUTEROL SULFATE 2.5; .5 MG/3ML; MG/3ML
3 SOLUTION RESPIRATORY (INHALATION)
Status: DISCONTINUED | OUTPATIENT
Start: 2018-10-08 | End: 2018-10-08 | Stop reason: HOSPADM

## 2018-10-08 RX ADMIN — AMPICILLIN SODIUM AND SULBACTAM SODIUM 3 G: 2; 1 INJECTION, POWDER, FOR SOLUTION INTRAMUSCULAR; INTRAVENOUS at 06:39

## 2018-10-08 RX ADMIN — CHOLESTYRAMINE 4 G: 4 POWDER, FOR SUSPENSION ORAL at 09:42

## 2018-10-08 RX ADMIN — THERA TABS 1 TABLET: TAB at 09:42

## 2018-10-08 RX ADMIN — ZINC OXIDE: 200 OINTMENT TOPICAL at 16:00

## 2018-10-08 RX ADMIN — FAMOTIDINE 20 MG: 20 TABLET ORAL at 09:00

## 2018-10-08 RX ADMIN — CHOLESTYRAMINE 4 G: 4 POWDER, FOR SUSPENSION ORAL at 12:14

## 2018-10-08 RX ADMIN — GUAIFENESIN 600 MG: 600 TABLET, EXTENDED RELEASE ORAL at 09:42

## 2018-10-08 RX ADMIN — CHOLESTYRAMINE 4 G: 4 POWDER, FOR SUSPENSION ORAL at 18:10

## 2018-10-08 RX ADMIN — ACETAMINOPHEN 500 MG: 500 TABLET, FILM COATED ORAL at 06:39

## 2018-10-08 RX ADMIN — IPRATROPIUM BROMIDE AND ALBUTEROL SULFATE 3 ML: .5; 3 SOLUTION RESPIRATORY (INHALATION) at 14:09

## 2018-10-08 RX ADMIN — AMOXICILLIN AND CLAVULANATE POTASSIUM 1 TABLET: 875; 125 TABLET, FILM COATED ORAL at 12:14

## 2018-10-08 RX ADMIN — ZINC OXIDE: 200 OINTMENT TOPICAL at 09:43

## 2018-10-08 RX ADMIN — Medication 1 CAPSULE: at 09:42

## 2018-10-08 NOTE — DISCHARGE INSTRUCTIONS
Preventing Falls: Care Instructions  Your Care Instructions    Getting around your home safely can be a challenge if you have injuries or health problems that make it easy for you to fall. Loose rugs and furniture in walkways are among the dangers for many older people who have problems walking or who have poor eyesight. People who have conditions such as arthritis, osteoporosis, or dementia also have to be careful not to fall. You can make your home safer with a few simple measures. Follow-up care is a key part of your treatment and safety. Be sure to make and go to all appointments, and call your doctor if you are having problems. It's also a good idea to know your test results and keep a list of the medicines you take. How can you care for yourself at home? Taking care of yourself  · You may get dizzy if you do not drink enough water. To prevent dehydration, drink plenty of fluids, enough so that your urine is light yellow or clear like water. Choose water and other caffeine-free clear liquids. If you have kidney, heart, or liver disease and have to limit fluids, talk with your doctor before you increase the amount of fluids you drink. · Exercise regularly to improve your strength, muscle tone, and balance. Walk if you can. Swimming may be a good choice if you cannot walk easily. · Have your vision and hearing checked each year or any time you notice a change. If you have trouble seeing and hearing, you might not be able to avoid objects and could lose your balance. · Know the side effects of the medicines you take. Ask your doctor or pharmacist whether the medicines you take can affect your balance. Sleeping pills or sedatives can affect your balance. · Limit the amount of alcohol you drink. Alcohol can impair your balance and other senses. · Ask your doctor whether calluses or corns on your feet need to be removed.  If you wear loose-fitting shoes because of calluses or corns, you can lose your balance and fall. · Talk to your doctor if you have numbness in your feet. Preventing falls at home  · Remove raised doorway thresholds, throw rugs, and clutter. Repair loose carpet or raised areas in the floor. · Move furniture and electrical cords to keep them out of walking paths. · Use nonskid floor wax, and wipe up spills right away, especially on ceramic tile floors. · If you use a walker or cane, put rubber tips on it. If you use crutches, clean the bottoms of them regularly with an abrasive pad, such as steel wool. · Keep your house well lit, especially Marilin Golas, and outside walkways. Use night-lights in areas such as hallways and bathrooms. Add extra light switches or use remote switches (such as switches that go on or off when you clap your hands) to make it easier to turn lights on if you have to get up during the night. · Install sturdy handrails on stairways. · Move items in your cabinets so that the things you use a lot are on the lower shelves (about waist level). · Keep a cordless phone and a flashlight with new batteries by your bed. If possible, put a phone in each of the main rooms of your house, or carry a cell phone in case you fall and cannot reach a phone. Or, you can wear a device around your neck or wrist. You push a button that sends a signal for help. · Wear low-heeled shoes that fit well and give your feet good support. Use footwear with nonskid soles. Check the heels and soles of your shoes for wear. Repair or replace worn heels or soles. · Do not wear socks without shoes on wood floors. · Walk on the grass when the sidewalks are slippery. If you live in an area that gets snow and ice in the winter, sprinkle salt on slippery steps and sidewalks. Preventing falls in the bath  · Install grab bars and nonskid mats inside and outside your shower or tub and near the toilet and sinks. · Use shower chairs and bath benches.   · Use a hand-held shower head that will allow you to sit while showering. · Get into a tub or shower by putting the weaker leg in first. Get out of a tub or shower with your strong side first.  · Repair loose toilet seats and consider installing a raised toilet seat to make getting on and off the toilet easier. · Keep your bathroom door unlocked while you are in the shower. Where can you learn more? Go to http://justin-oscar.info/. Enter 0476 [id number] in the search box to learn more about \"Preventing Falls: Care Instructions. \"  Current as of: March 16, 2018  Content Version: 11.8  © 5673-3797 [employer]. Care instructions adapted under license by [employer] (which disclaims liability or warranty for this information). If you have questions about a medical condition or this instruction, always ask your healthcare professional. [place] [address] any warranty or liability for your use of this information. Bronchitis: Care Instructions  Your Care Instructions    Bronchitis is inflammation of the bronchial tubes, which carry air to the lungs. The tubes swell and produce mucus, or phlegm. The mucus and inflamed bronchial tubes make you cough. You may have trouble breathing. Most cases of bronchitis are caused by viruses like those that cause colds. Antibiotics usually do not help and they may be harmful. Bronchitis usually develops rapidly and lasts about 2 to 3 weeks in otherwise healthy people. Follow-up care is a key part of your treatment and safety. Be sure to make and go to all appointments, and call your doctor if you are having problems. It's also a good idea to know your test results and keep a list of the medicines you take. How can you care for yourself at home? · Take all medicines exactly as prescribed. Call your doctor if you think you are having a problem with your medicine.   · Get some extra rest.  · Take an over-the-counter pain medicine, such as acetaminophen (Tylenol), ibuprofen (Advil, Motrin), or naproxen (Aleve) to reduce fever and relieve body aches. Read and follow all instructions on the label. · Do not take two or more pain medicines at the same time unless the doctor told you to. Many pain medicines have acetaminophen, which is Tylenol. Too much acetaminophen (Tylenol) can be harmful. · Take an over-the-counter cough medicine that contains dextromethorphan to help quiet a dry, hacking cough so that you can sleep. Avoid cough medicines that have more than one active ingredient. Read and follow all instructions on the label. · Breathe moist air from a humidifier, hot shower, or sink filled with hot water. The heat and moisture will thin mucus so you can cough it out. · Do not smoke. Smoking can make bronchitis worse. If you need help quitting, talk to your doctor about stop-smoking programs and medicines. These can increase your chances of quitting for good. When should you call for help? Call 911 anytime you think you may need emergency care. For example, call if:    · You have severe trouble breathing.    Call your doctor now or seek immediate medical care if:    · You have new or worse trouble breathing.     · You cough up dark brown or bloody mucus (sputum).     · You have a new or higher fever.     · You have a new rash.    Watch closely for changes in your health, and be sure to contact your doctor if:    · You cough more deeply or more often, especially if you notice more mucus or a change in the color of your mucus.     · You are not getting better as expected. Where can you learn more? Go to http://justin-oscar.info/. Enter H333 in the search box to learn more about \"Bronchitis: Care Instructions. \"  Current as of: December 6, 2017  Content Version: 11.8  © 1908-9570 [employer]. Care instructions adapted under license by [employer] (which disclaims liability or warranty for this information).  If you have questions about a medical condition or this instruction, always ask your healthcare professional. [address] any warranty or liability for your use of this information. Learning About COPD and How to Prevent Lung Infections  How do lung infections affect COPD? Lung infections like pneumonia and acute bronchitis are common causes of COPD flare-ups. And people who have COPD are more likely to get these lung infections, especially if they smoke. When you have COPD, it is important to know the symptoms of pneumonia and acute bronchitis and call your doctor if you have them. Symptoms include:  · A cough that brings up more mucus than usual.  · Fever. · Shortness of breath. What can you do to prevent these infections? Stay healthy  · Get a flu shot every year. · Get a pneumococcal vaccine shot. If you have had one before, ask your doctor whether you need another dose. Two different types of pneumococcal vaccines are recommended for people ages 72 and older. · If you must be around people with colds or the flu, wash your hands often. · Do not smoke. This is the most important step you can take to prevent more damage to your lungs. If you need help quitting, talk to your doctor about stop-smoking programs and medicines. These can increase your chances of quitting for good. · Avoid secondhand smoke, air pollution, and high altitudes. Also avoid cold, dry air and hot, humid air. Stay at home with your windows closed when air pollution is bad. Exercise and eat well  · If your doctor recommends it, get more exercise. Walking is a good choice. Bit by bit, increase the amount you walk every day. Try for at least 30 minutes on most days of the week. · Eat regular, well-balanced meals. Eating right keeps your energy levels up and helps your body fight infection. · Get plenty of rest and sleep. Follow-up care is a key part of your treatment and safety.  Be sure to make and go to all appointments, and call your doctor if you are having problems. It's also a good idea to know your test results and keep a list of the medicines you take. Where can you learn more? Go to http://justin-oscar.info/. Enter I044 in the search box to learn more about \"Learning About COPD and How to Prevent Lung Infections. \"  Current as of: December 6, 2017  Content Version: 11.8  © 2472-3355 [employer]. Care instructions adapted under license by [employer] (which disclaims liability or warranty for this information). If you have questions about a medical condition or this instruction, always ask your healthcare professional. [address] any warranty or liability for your use of this information.

## 2018-10-08 NOTE — PROGRESS NOTES
Problem: Falls - Risk of 
Goal: *Absence of Falls Document Gamal Govea Fall Risk and appropriate interventions in the flowsheet. Outcome: Progressing Towards Goal 
Fall Risk Interventions: 
Mobility Interventions: PT Consult for mobility concerns Mentation Interventions: Door open when patient unattended, Room close to nurse's station Medication Interventions: Patient to call before getting OOB, Bed/chair exit alarm Elimination Interventions: Call light in reach, Toileting schedule/hourly rounds History of Falls Interventions: Door open when patient unattended, Room close to nurse's station

## 2018-10-08 NOTE — PROGRESS NOTES
Called Apurva of [facility] and [place] and she needed current PT/OT notes for auth. Faxed PT notes to her. Roman Carter of [facility] for airways clearance vest will be [facility] [address] [facility] [address] [facility] [place] [facility]. 
 
1105: As per Apurva of [facility] [address] [facility], they do not have auth yet from the insurance. 1357:  Roman Carter of [facility] is waiting for pt to transfer to [facility] [address] [facility] to be fitted for airways clearance vest. 
1455:  [address] [facility] [address] [facility] [place] still does not have auth. 
1625:  Dilia Mars and she states she is still waiting for auth but to go ahead and set up will call transport. Will Call transport set with [address] to [facility] and rehab. 
3318: As per [address] [facility] [address] [facility] [place], pt now has auth. Paged Dr. Jacquelin Schlatter 
[phone]:  Dr. Jacquelin Schlatter informed pt has auth. Informed pt's brother Kandy Or. Pt is DNR but not on file. As per Kandy Martinez he spoke with pt about signing the DNR and he agreed. pt can sign the DNR.   Informed Dr. Jacquelin Schlatter. 
Informed Nursing pt will be discharged to [facility]

## 2018-10-08 NOTE — ROUTINE PROCESS
Report received from [facility]. Patient is alert, no distress noted. O2 via NC in place. Call bell within reach. 0430 Mews noted for HR, low 100's. Patient is in no distress. MD is aware. Continue to monitor. 0730  Bedside, Verbal and Written shift change report given to  [address] [place] [address] (oncoming nurse) by Barrington Wiley RN (offgoing nurse). Report included the following information SBAR, Kardex, MAR and Accordion.

## 2018-10-08 NOTE — PROGRESS NOTES
Patient discharged via EMR to [facility]; report called to Nicolasa Graft; Iv removed; monitor returned; skin is intact; pain is denied; vitals stable; medication list reviewed using teach-back method, no further questions at this time; patient escorted off unit via stretcher with belongings and discharge paperwork

## 2018-10-08 NOTE — PROGRESS NOTES
Occupational Therapy Note Patient: David Graham (84 y.o. male) Date: 10/8/2018 Diagnosis: Acute bronchitis with COPD ([address] SOB (shortness of breath) Hypoxia Community acquired pneumonia Precautions: Fall, Aspiration, Skin, TTWB (R LE) Chart, occupational therapy assessment, plan of care, and goals were reviewed. Occupational Therapy treatment attempted. Patient is unable to participate due to: 
[]  Nausea/vomiting 
[]  Eating 
[]  Pain 
[]  Pt lethargic 
[]  Off Unit 
[x] Other: 
Attempt x1 @ 8347, pt refusing therapy at this time stating, \"ma'am I cannot today I was in a lot of pain\"; pt continues to find multiple excuses for lack of participation in OOB activity, self-limiting. Pt was provided max education on importance of OOB activity for recovery; pt still refusing. Will f/u later as schedule allows. Thank you.  
 
THU Brock

## 2018-10-08 NOTE — PROGRESS NOTES
Occupational Therapy Note Patient: Nelly Benitez (84 y.o. male) Date: 10/8/2018 Diagnosis: Acute bronchitis with COPD ([address] SOB (shortness of breath) Hypoxia Community acquired pneumonia Precautions: Fall, Aspiration, Skin, TTWB (R LE) Chart, occupational therapy assessment, plan of care, and goals were reviewed. Occupational Therapy treatment attempted. Patient is unable to participate due to: 
[]  Nausea/vomiting 
[]  Eating 
[]  Pain 
[]  Pt lethargic 
[]  Off Unit 
[x] Other: 
Attempt x1 @ 4335, pt refusing therapy at this time stating, \"ma'am I cannot today I was in a lot of pain\". Pt was provided max education on importance of OOB activity for recovery; pt still refusing. Will f/u later as schedule allows. Thank you.  
 
ABEL Galvan/YESSICA

## 2018-10-08 NOTE — DISCHARGE SUMMARY
ADDENDUM TO DISCHARGE SUMMARY BY DR Bettencourt [address] [place] 10/5/18. Patient has remained stable. Case douglas has advised that bed is available at SNF today. I d/w patient and he is agreeable to transition to SNF. I also d/w patient regarding DDNR and patient agrees and wishes to sign DDNR. I also called and d/w patients brother [phone] regarding discharge plans and DDNR. Sheron Goodrich agrees with DDNR and wants his brother to sign it. Patient Saul Whitaker signed DDNR in the presence of RN.      DISCHARGE INSTRUCTIONS    Diet- Regular diet with Nectar Thickened Liquids  Ensure Enlive, 1 po Three times daily  Activity - as tolerated  Toe Touch Weight bearing RLE per ortho  HOB> 30 degrees at all times  FALL PRECAUTIONS  ASPIRATION PRECAUTIONS  DECUBITUS PRECAUTIONS  Oxygen 2-3 liters via NC continuously to keep O2 sats > 90  VEST, CHest PT as directed  Wound care as directed  Incentive Spirometry Q30 minutes when awake  PT, OT Evaluate and Treat  Speech Therpay- evaluate and treat  Check CBC, CMP, Mg in 3 days and the once every week whil on antibiotics  F/u with PCP in 1 week  F/u with Pulmonary Dr Chance Lundy in 3 weeks  F/u with ID in 3 weeks    Visit Vitals    /48 (BP 1 Location: Right arm, BP Patient Position: At rest)    Pulse (!) 106    Temp 97.5 °F (36.4 °C)    Resp 18    Ht 5' 11\" (1.803 m)    Wt 51 kg (112 lb 6.4 oz)    SpO2 97%    BMI 15.68 kg/m2     General:  Awake, follows commands, responds appropriately, alert  Cardiovascular:  S1S2+, RRR  Pulmonary:  Coarse bs b/l  GI:  Soft, BS+, NT, ND  Extremities:  No edema  Frail      Elio Ortiz MD  10/8/2018

## 2018-10-08 NOTE — PROGRESS NOTES
Problem: Falls - Risk of 
Goal: *Absence of Falls Document [address] Lb Fall Risk and appropriate interventions in the flowsheet. Outcome: Progressing Towards Goal 
Fall Risk Interventions: 
Mobility Interventions: PT Consult for mobility concerns Mentation Interventions: Door open when patient unattended, Room close to nurse's station Medication Interventions: Patient to call before getting OOB, Bed/chair exit alarm Elimination Interventions: Call light in reach, Toileting schedule/hourly rounds History of Falls Interventions: Door open when patient unattended, Room close to nurse's station Problem: Pressure Injury - Risk of 
Goal: *Prevention of pressure injury Document Luan Scale and appropriate interventions in the flowsheet. Specialty bed ordered 10-3-18. Outcome: Progressing Towards Goal 
Pressure Injury Interventions: 
Sensory Interventions: Avoid rigorous massage over bony prominences, Assess need for specialty bed, Maintain/enhance activity level Moisture Interventions: Absorbent underpads, Internal/External urinary devices Activity Interventions: Increase time out of bed, PT/OT evaluation Mobility Interventions: Pressure redistribution bed/mattress (bed type) Nutrition Interventions: Document food/fluid/supplement intake Friction and Shear Interventions: Apply protective barrier, creams and emollients

## 2018-10-08 NOTE — PROGRESS NOTES
Transition of Care (OSEAS) Plan:  Pt has been discharged to [address] [place] [address] Transportation: How is patient being transported at discharge? bls When? today Is transport scheduled? Follow-up appointment and transportation: 
 
 PCP? Specialist? 
 
 Time/Date? Who is transporting to the follow-up appointment? Is transport for follow up appointment scheduled? Communication plan (with patient/family): Who is being called? Patient or Next of Kin? Responsible party? What number(s) is to be used? What service provider is calling for [facility] services? When are they calling? Readmission Risk? (Green/Low; Yellow/Moderate; Red/High): green/low

## 2018-10-08 NOTE — PROGRESS NOTES
Infectious Disease progress Note Requested by: dr. Mcgee Clarity Reason: gram positive bacteremia, pneumonia Current abx Prior abx Amp/sulbactam since 9/28 Levofloxacin 9/26-9/27 
azithromycin 9/27- 9/30 
ceftriaxone 9/27-9/28 Lines:  
 
 
Assessment : 
68 y.o. male with no known significant past medical history presented to ed on 9/26/18 with generalized weakness, inability to get out of his chair. CT chest: Bronchitis, bronchiolitis with severe cystic bronchiectasis worse in the bilateral lower lobes with bilateral lung base pneumonia and bilateral lower lobe cysts with air-fluid levels. Clinical picture consistent with bilateral pneumonia superimposed on underlying bronchiectasis, chronic lung disease. Sputum cx 10/3 - few candida albicans, few normal respiratory alissa. Candida in sputum cx likely colonizer. 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 Swallow evaluation results indicated risk for aspiration. Single positive blood culture for staph species 9/26 likely contaminant. Rash on face/LE: likely drug rash - unable to determine which med. Improved on today's exam. 
 
Recommendations: 
 
1. D/c amp/sulbactam. Start po amoxicillin/clavulanate for 4 weeks 2. F/u with pulmonary after 3 weeks 3. Repeat ct scan after 3-4 weeks per pulmonary 4. Benadryl prn.  
5. Ok to d/c patient from ID standpoint Advance Care planning: DNR: discussed  with patient/surrogate decision maker:Josseline Del Valle: [phone] Above plan was discussed in details with patient. Please call me if any further questions or concerns. Will continue to participate in the care of this patient. subjective: 
 
Feels better. Denies chest pain, increased cough, chills, fever. Patient denies headaches, visual disturbances, sore throat,earaches,  abdominal pain, diarrhea, burning micturition, pain or weakness in extremities. He denies back pain/flank pain. home Medication List  
 Details acetaminophen (TYLENOL) 500 mg tablet Take 1 Tab by mouth every six (6) hours as needed. Qty: 25 Tab, Refills: 1  
  
albuterol-ipratropium (DUO-NEB) 2.5 mg-0.5 mg/3 ml nebu 3 mL by Nebulization route every six (6) hours as needed. Qty: 100 Nebule, Refills: 4  
  
cholestyramine-aspartame (QUESTRAN LIGHT) 4 gram packet Take 1 Packet by mouth three (3) times daily (with meals) for 14 days. Qty: 42 Packet, Refills: 0  
  
diphenhydrAMINE (BENADRYL) 25 mg capsule Take 1 Cap by mouth every six (6) hours as needed for up to 10 days. Qty: 20 Cap, Refills: 0  
  
enoxaparin (LOVENOX) 40 mg/0.4 mL 0.4 mL by SubCUTAneous route every twenty-four (24) hours for 21 days. Qty: 21 Syringe, Refills: 0  
  
guaiFENesin ER (MUCINEX) 600 mg ER tablet Take 1 Tab by mouth every twelve (12) hours for 7 days. Qty: 14 Tab, Refills: 0  
  
lactobacillus sp. 50 billion cpu (BIO-K PLUS) 50 billion cell -375 mg cap capsule Take 1 Cap by mouth daily for 35 days. Qty: 30 Cap, Refills: 1 therapeutic multivitamin (THERAGRAN) tablet Take 1 Tab by mouth daily. Qty: 30 Tab, Refills: 11  
  
zinc oxide 20 % ointment Apply 1 g to affected area three (3) times daily. Qty: 30 g, Refills: 0  
  
amoxicillin-clavulanate (AUGMENTIN) 875-125 mg per tablet Take 1 Tab by mouth two (2) times a day for 30 days. Qty: 60 Tab, Refills: 0 Current Facility-Administered Medications Medication Dose Route Frequency  albuterol-ipratropium (DUO-NEB) 2.5 MG-0.5 MG/3 ML  3 mL Nebulization TID RT  
 zinc oxide 20 % ointment   Topical TID  diphenhydrAMINE (BENADRYL) capsule 25 mg  25 mg Oral Q6H PRN  cholestyramine-aspartame (QUESTRAN LIGHT) packet 4 g  4 g Oral TID WITH MEALS  
 acetaminophen (TYLENOL) tablet 500 mg  500 mg Oral Q6H PRN  
 lactobacillus sp. 50 billion cpu (BIO-K PLUS) capsule 1 Cap  1 Cap Oral DAILY  ampicillin-sulbactam (UNASYN) 3 g in 0.9% sodium chloride (MBP/ADV) 100 mL MBP  3 g IntraVENous Q6H  
  enoxaparin (LOVENOX) injection 40 mg  40 mg SubCUTAneous Q24H  
 ondansetron (ZOFRAN ODT) tablet 4 mg  4 mg Oral Q6H PRN  
 famotidine (PEPCID) tablet 20 mg  20 mg Oral BID  therapeutic multivitamin (THERAGRAN) tablet 1 Tab  1 Tab Oral DAILY  guaiFENesin ER (MUCINEX) tablet 600 mg  600 mg Oral Q12H Allergies: Cat dander and Pollen extracts Temp (24hrs), Av.7 °F (36.5 °C), Min:97.4 °F (36.3 °C), Max:97.8 °F (36.6 °C) Visit Vitals  /69 (BP 1 Location: Right arm, BP Patient Position: At rest)  Pulse (!) 112 Comment: Simultaneous filing. User may not have seen previous data.  Temp 97.8 °F (36.6 °C)  Resp 20  
 Ht 5' 11\" (1.803 m)  Wt 51 kg (112 lb 6.4 oz)  SpO2 93%  BMI 15.68 kg/m2 ROS: 12 point ROS obtained in details. Pertinent positives as mentioned in HPI,  
otherwise negative Physical Exam: 
 
 
Constitutional: He is oriented to person, place, and time. He appears well-nourished. Frail, elderly HENT:  
Head: Normocephalic and atraumatic. Eyes: EOM are normal. Pupils are equal, round, and reactive to light. Neck: Normal range of motion. Neck supple. Cardiovascular: Normal rate, regular rhythm and normal heart sounds. Exam reveals no friction rub. No murmur heard. Pulmonary/Chest: Effort normal. No respiratory distress. Bilateral LL rales, occasional wheezes Abdominal: Soft. He exhibits no distension. There is no tenderness. There is no rebound and no guarding. Musculoskeletal: Normal range of motion. Neurological: He is alert and oriented to person, place, and time. Skin: Skin is warm and dry. Psychiatric: He has a normal mood and affect. His behavior is normal. Thought content normal.  
  
 
Labs: Results:  
Chemistry No results for input(s): GLU, NA, K, CL, CO2, BUN, CREA, CA, AGAP, BUCR, TBIL, GPT, AP, TP, ALB, GLOB, AGRAT in the last 72 hours. CBC w/Diff Recent Labs 10/08/18 
 2777 WBC  8.3  
RBC  3.25* HGB  10.5* HCT  31.6*  
PLT  204 Microbiology No results for input(s): CULT in the last 72 hours. RADIOLOGY: 
 
All available imaging studies/reports in [facility] for this admission were reviewed Dr. Jc Mota, Infectious Disease Specialist 
[phone] October 8, 2018 
1:31 PM

## 2018-10-08 NOTE — ROUTINE PROCESS
Bedside, Verbal and Written shift change report given to [address] [place] [address] (oncoming nurse) by Anitha Up RN (offgoing nurse). Report included the following information SBAR, Kardex, MAR and Accordion.

## 2018-10-08 NOTE — PROGRESS NOTES
NUTRITION Nursing Referral: [facility] Nutrition Consult: General Nutrition Management & Supplements RECOMMENDATIONS / PLAN:  
 
- Consider starting appetite stimulant as medically appropriate. - Monitor and encourage po intake as tolerated. - Continue RD inpatient monitoring and evaluation. NUTRITION INTERVENTIONS & DIAGNOSIS:  
 
[x] Meals/snacks: general/healthful diet [x] Medical food supplement therapy: Ensure Enlive, TID [x] Nutrition related medication management: consider appetite stimulant [x] Collaboration and referral of nutrition care: Discussed starting appetite stimulant with Dr. Juana Canales. 
 
Nutrition Diagnosis: Unintended weight loss related to inadequate energy intake as evidenced by 14 lb, 11% weight loss from reported usual weight. Inadequate energy intake related to decreased appetite as evidenced by pt consuming 50% or less of recent meals. Patient meets criteria for Severe Protein Calorie Malnutrition as evidenced by:  
ASPEN Malnutrition Criteria Acute Illness, Chronic Illness, or Social/Enviornmental: Chronic illness Energy Intake: Less than/equal to 75% of est energy req for greater than/equal to 1 month Muscle Mass: Severe (Clavicle, scapular, patellar & anterior thigh regions) ASPEN Malnutrition Score - Chronic Illness: 12.  
 
ASSESSMENT:  
 
10/8: Per pt and nursing pt with 50% meal intake today at breakfast. Continues to refuse some meals with lack of desire to eat and with poor appetite. Pt reports dislike of most supplements. 10/4: Pt reports good appetite but per nursing pt with poor intake. Ate about 20% of eggs this am. NFPE completed today. Pt does not want lunch today, discussed food options, pt finally agreeable to sandwich. 10/2: Pt reports good meal intake today but per chart pt with poor intake/appetite. Nursing unsure about meal intake today, with plan to monitor. Dislikes Ensure, plan to modify. Electrolytes replaced. 9/27: Pt reports good appetite and eating well PTA, consuming lost of sweets. Hungry and has not eaten today, diet started after swallow evaluation by SLP. Agreeable to supplements. Average po intake adequate to meet patients estimated nutritional needs:   [] Yes     [] No   [x] Unable to determine at this time Diet: DIET REGULAR 1 NECTAR 
DIET NUTRITIONAL SUPPLEMENTS Breakfast, Lunch, Dinner; Duwaine Anes Food Allergies: NKFA Current Appetite:   [] Good     [x] Fair     [x] Poor     [] Other: 
Appetite/meal intake prior to admission:   [x] Good     [] Fair     [] Poor     [x] Other: spoke with pt's brother 10/4 who reports pt typically consumes 2 donuts for breakfast, skips lunch but consumes 3 beers, and very small meal for dinner (typically a piece of chicken) Feeding Limitations:  [x] Swallowing difficulty: SLP following, MBS completed 9/28/18    [] Chewing difficulty    [] Other: 
Current Meal Intake:  
Patient Vitals for the past 100 hrs: 
 % Diet Eaten 10/05/18 1800 0 % BM: 10/8 Skin Integrity: redness with blanching to sacral/coccyx with friction wound, wound to mid back Edema:   [x] No     [] Yes Pertinent Medications: Reviewed: zofran, lactobacillus, theragran No results for input(s): NA, K, CL, CO2, GLU, BUN, CREA, CA, MG, PHOS, ALB, PREALB, TBIL, SGOT, ALT in the last 72 hours. Intake/Output Summary (Last 24 hours) at 10/08/18 1524 Last data filed at 10/08/18 0124 Gross per 24 hour Intake                0 ml Output              625 ml Net             -625 ml Anthropometrics: 
Ht Readings from Last 1 Encounters:  
09/28/18 5' 11\" (1.803 m) Last 3 Recorded Weights in this Encounter 10/06/18 0413 10/07/18 0533 10/08/18 0424 Weight: 52.9 kg (116 lb 11.2 oz) 52.3 kg (115 lb 3.2 oz) 51 kg (112 lb 6.4 oz) Body mass index is 15.68 kg/(m^2). Underweight Weight History: patient reports usual weight of 130 lb, unknown time period of weight loss (14 lb, 11% weight loss) Weight Metrics 10/8/2018 Weight 112 lb 6.4 oz BMI 15.68 kg/m2 Admitting Diagnosis: Acute bronchitis with COPD ([address] SOB (shortness of breath) Hypoxia Pertinent PMHx: No pertinent PMH Education Needs:        [x] None identified  [] Identified - Not appropriate at this time  []  Identified and addressed - refer to education log Learning Limitations:   [x] None identified  [] Identified Cultural, [place] & ethnic food preferences:  [x] None identified    [] Identified and addressed ESTIMATED NUTRITION NEEDS:  
 
Calories: 9205-7662 kcal (MSJx1.2-1.5) based on  [x] Actual BW 53 kg     [] IBW Protein: 64-80 gm (1.2-1.5 gm/kg) based on  [x] Actual BW      [] IBW Fluid: 1 mL/kcal 
  
MONITORING & EVALUATION:  
 
Nutrition Goal(s): 1. Po intake of meals will meet >75% of patient estimated nutritional needs within the next 7 days. Outcome:  [] Met/Ongoing    [x]  Not Met/Progressing    [] New/Initial Goal  
 
Monitoring:   [x] Food and beverage intake   [x] Diet order   [x] Nutrition-focused physical findings   [x] Treatment/therapy   [] Weight   [] Enteral nutrition intake Previous Recommendations (for follow-up assessments only):     [x]   Implemented       []   Not Implemented (RD to address)      [] No Longer Appropriate     [] No Recommendation Made Discharge Planning: regular diet; consistency per SLP [x] Participated in care planning, discharge planning, & interdisciplinary rounds as appropriate Shante Bearden RD Pager: [phone]

## 2024-12-04 NOTE — ROUTINE PROCESS
Bedside and Verbal shift change report given to [facility] RN and Nico Feliciano RN (oncoming nurse) by Alma Conti RN and Luis Junior RN (offgoing nurse). Report included the following information SBAR, Kardex, Intake/Output, MAR and Recent Results. Patient lying in bed quietly resting. Detail Level: Zone Patient Specific Counseling (Will Not Stick From Patient To Patient): Instructed to schedule FSE in 8 weeks.